# Patient Record
Sex: MALE | Race: ASIAN | NOT HISPANIC OR LATINO | Employment: UNEMPLOYED | URBAN - METROPOLITAN AREA
[De-identification: names, ages, dates, MRNs, and addresses within clinical notes are randomized per-mention and may not be internally consistent; named-entity substitution may affect disease eponyms.]

---

## 2022-05-02 ENCOUNTER — HOSPITAL ENCOUNTER (EMERGENCY)
Facility: MEDICAL CENTER | Age: 52
End: 2022-05-02
Attending: EMERGENCY MEDICINE
Payer: COMMERCIAL

## 2022-05-02 ENCOUNTER — APPOINTMENT (OUTPATIENT)
Dept: RADIOLOGY | Facility: MEDICAL CENTER | Age: 52
End: 2022-05-02
Attending: EMERGENCY MEDICINE
Payer: COMMERCIAL

## 2022-05-02 VITALS
DIASTOLIC BLOOD PRESSURE: 92 MMHG | HEART RATE: 102 BPM | SYSTOLIC BLOOD PRESSURE: 149 MMHG | TEMPERATURE: 98.2 F | OXYGEN SATURATION: 94 % | BODY MASS INDEX: 27.81 KG/M2 | WEIGHT: 173.06 LBS | RESPIRATION RATE: 19 BRPM | HEIGHT: 66 IN

## 2022-05-02 DIAGNOSIS — E11.9 TYPE 2 DIABETES MELLITUS WITHOUT COMPLICATION, WITHOUT LONG-TERM CURRENT USE OF INSULIN (HCC): ICD-10-CM

## 2022-05-02 DIAGNOSIS — M10.9 ACUTE GOUT OF LEFT FOOT, UNSPECIFIED CAUSE: ICD-10-CM

## 2022-05-02 DIAGNOSIS — J02.0 STREP PHARYNGITIS: ICD-10-CM

## 2022-05-02 DIAGNOSIS — E78.5 HYPERLIPIDEMIA, UNSPECIFIED HYPERLIPIDEMIA TYPE: ICD-10-CM

## 2022-05-02 DIAGNOSIS — E04.1 THYROID NODULE: ICD-10-CM

## 2022-05-02 LAB
ALBUMIN SERPL BCP-MCNC: 4.5 G/DL (ref 3.2–4.9)
ALBUMIN/GLOB SERPL: 1.2 G/DL
ALP SERPL-CCNC: 156 U/L (ref 30–99)
ALT SERPL-CCNC: 84 U/L (ref 2–50)
ANION GAP SERPL CALC-SCNC: 15 MMOL/L (ref 7–16)
AST SERPL-CCNC: 36 U/L (ref 12–45)
BASOPHILS # BLD AUTO: 0.4 % (ref 0–1.8)
BASOPHILS # BLD: 0.07 K/UL (ref 0–0.12)
BILIRUB SERPL-MCNC: 0.7 MG/DL (ref 0.1–1.5)
BUN SERPL-MCNC: 12 MG/DL (ref 8–22)
CALCIUM SERPL-MCNC: 9.7 MG/DL (ref 8.5–10.5)
CHLORIDE SERPL-SCNC: 103 MMOL/L (ref 96–112)
CO2 SERPL-SCNC: 21 MMOL/L (ref 20–33)
CREAT SERPL-MCNC: 0.75 MG/DL (ref 0.5–1.4)
EOSINOPHIL # BLD AUTO: 0.04 K/UL (ref 0–0.51)
EOSINOPHIL NFR BLD: 0.2 % (ref 0–6.9)
ERYTHROCYTE [DISTWIDTH] IN BLOOD BY AUTOMATED COUNT: 42.3 FL (ref 35.9–50)
GFR SERPLBLD CREATININE-BSD FMLA CKD-EPI: 109 ML/MIN/1.73 M 2
GLOBULIN SER CALC-MCNC: 3.8 G/DL (ref 1.9–3.5)
GLUCOSE SERPL-MCNC: 129 MG/DL (ref 65–99)
HCT VFR BLD AUTO: 53.2 % (ref 42–52)
HGB BLD-MCNC: 18.5 G/DL (ref 14–18)
IMM GRANULOCYTES # BLD AUTO: 0.16 K/UL (ref 0–0.11)
IMM GRANULOCYTES NFR BLD AUTO: 1 % (ref 0–0.9)
LACTATE BLD-SCNC: 1.3 MMOL/L (ref 0.5–2)
LYMPHOCYTES # BLD AUTO: 1.37 K/UL (ref 1–4.8)
LYMPHOCYTES NFR BLD: 8.3 % (ref 22–41)
MCH RBC QN AUTO: 28.7 PG (ref 27–33)
MCHC RBC AUTO-ENTMCNC: 34.8 G/DL (ref 33.7–35.3)
MCV RBC AUTO: 82.5 FL (ref 81.4–97.8)
MONOCYTES # BLD AUTO: 0.87 K/UL (ref 0–0.85)
MONOCYTES NFR BLD AUTO: 5.3 % (ref 0–13.4)
NEUTROPHILS # BLD AUTO: 13.96 K/UL (ref 1.82–7.42)
NEUTROPHILS NFR BLD: 84.8 % (ref 44–72)
NRBC # BLD AUTO: 0 K/UL
NRBC BLD-RTO: 0 /100 WBC
PLATELET # BLD AUTO: 349 K/UL (ref 164–446)
PMV BLD AUTO: 9 FL (ref 9–12.9)
POTASSIUM SERPL-SCNC: 4 MMOL/L (ref 3.6–5.5)
PROT SERPL-MCNC: 8.3 G/DL (ref 6–8.2)
RBC # BLD AUTO: 6.45 M/UL (ref 4.7–6.1)
S PYO DNA SPEC NAA+PROBE: DETECTED
SODIUM SERPL-SCNC: 139 MMOL/L (ref 135–145)
WBC # BLD AUTO: 16.5 K/UL (ref 4.8–10.8)

## 2022-05-02 PROCEDURE — 71045 X-RAY EXAM CHEST 1 VIEW: CPT

## 2022-05-02 PROCEDURE — 700105 HCHG RX REV CODE 258: Performed by: EMERGENCY MEDICINE

## 2022-05-02 PROCEDURE — 96365 THER/PROPH/DIAG IV INF INIT: CPT | Mod: XU

## 2022-05-02 PROCEDURE — 96375 TX/PRO/DX INJ NEW DRUG ADDON: CPT | Mod: XU

## 2022-05-02 PROCEDURE — 99284 EMERGENCY DEPT VISIT MOD MDM: CPT

## 2022-05-02 PROCEDURE — 80053 COMPREHEN METABOLIC PANEL: CPT

## 2022-05-02 PROCEDURE — 87040 BLOOD CULTURE FOR BACTERIA: CPT

## 2022-05-02 PROCEDURE — 36415 COLL VENOUS BLD VENIPUNCTURE: CPT

## 2022-05-02 PROCEDURE — 70491 CT SOFT TISSUE NECK W/DYE: CPT

## 2022-05-02 PROCEDURE — 700117 HCHG RX CONTRAST REV CODE 255: Performed by: EMERGENCY MEDICINE

## 2022-05-02 PROCEDURE — 83605 ASSAY OF LACTIC ACID: CPT

## 2022-05-02 PROCEDURE — 85025 COMPLETE CBC W/AUTO DIFF WBC: CPT

## 2022-05-02 PROCEDURE — 700111 HCHG RX REV CODE 636 W/ 250 OVERRIDE (IP): Performed by: EMERGENCY MEDICINE

## 2022-05-02 PROCEDURE — 87651 STREP A DNA AMP PROBE: CPT

## 2022-05-02 PROCEDURE — 93005 ELECTROCARDIOGRAM TRACING: CPT | Performed by: EMERGENCY MEDICINE

## 2022-05-02 PROCEDURE — 700101 HCHG RX REV CODE 250: Performed by: EMERGENCY MEDICINE

## 2022-05-02 RX ORDER — CLINDAMYCIN PHOSPHATE 600 MG/50ML
600 INJECTION, SOLUTION INTRAVENOUS ONCE
Status: COMPLETED | OUTPATIENT
Start: 2022-05-02 | End: 2022-05-02

## 2022-05-02 RX ORDER — DEXAMETHASONE SODIUM PHOSPHATE 4 MG/ML
10 INJECTION, SOLUTION INTRA-ARTICULAR; INTRALESIONAL; INTRAMUSCULAR; INTRAVENOUS; SOFT TISSUE ONCE
Status: COMPLETED | OUTPATIENT
Start: 2022-05-02 | End: 2022-05-02

## 2022-05-02 RX ORDER — SODIUM CHLORIDE, SODIUM LACTATE, POTASSIUM CHLORIDE, AND CALCIUM CHLORIDE .6; .31; .03; .02 G/100ML; G/100ML; G/100ML; G/100ML
1000 INJECTION, SOLUTION INTRAVENOUS ONCE
Status: COMPLETED | OUTPATIENT
Start: 2022-05-02 | End: 2022-05-02

## 2022-05-02 RX ORDER — COLCHICINE 0.6 MG/1
0.6 TABLET ORAL DAILY
Qty: 30 TABLET | Refills: 0 | Status: SHIPPED | OUTPATIENT
Start: 2022-05-02 | End: 2022-06-07

## 2022-05-02 RX ORDER — OXYCODONE HYDROCHLORIDE AND ACETAMINOPHEN 5; 325 MG/1; MG/1
1 TABLET ORAL EVERY 6 HOURS PRN
Qty: 10 TABLET | Refills: 0 | Status: SHIPPED | OUTPATIENT
Start: 2022-05-02 | End: 2022-05-07

## 2022-05-02 RX ORDER — PREDNISONE 20 MG/1
40 TABLET ORAL DAILY
Qty: 10 TABLET | Refills: 0 | Status: SHIPPED | OUTPATIENT
Start: 2022-05-02 | End: 2022-05-07

## 2022-05-02 RX ORDER — CLINDAMYCIN HYDROCHLORIDE 300 MG/1
300 CAPSULE ORAL 3 TIMES DAILY
Qty: 30 CAPSULE | Refills: 0 | Status: SHIPPED | OUTPATIENT
Start: 2022-05-02 | End: 2022-05-12

## 2022-05-02 RX ORDER — SIMVASTATIN 20 MG
20 TABLET ORAL NIGHTLY
Qty: 30 TABLET | Refills: 0 | Status: SHIPPED | OUTPATIENT
Start: 2022-05-02 | End: 2022-06-16 | Stop reason: SDUPTHER

## 2022-05-02 RX ORDER — ALLOPURINOL 300 MG/1
300 TABLET ORAL 2 TIMES DAILY
Qty: 60 TABLET | Refills: 0 | Status: SHIPPED | OUTPATIENT
Start: 2022-05-02 | End: 2022-06-01

## 2022-05-02 RX ORDER — KETOROLAC TROMETHAMINE 30 MG/ML
15 INJECTION, SOLUTION INTRAMUSCULAR; INTRAVENOUS ONCE
Status: COMPLETED | OUTPATIENT
Start: 2022-05-02 | End: 2022-05-02

## 2022-05-02 RX ADMIN — SODIUM CHLORIDE, POTASSIUM CHLORIDE, SODIUM LACTATE AND CALCIUM CHLORIDE 1000 ML: 600; 310; 30; 20 INJECTION, SOLUTION INTRAVENOUS at 04:05

## 2022-05-02 RX ADMIN — KETOROLAC TROMETHAMINE 15 MG: 30 INJECTION, SOLUTION INTRAMUSCULAR at 04:06

## 2022-05-02 RX ADMIN — CLINDAMYCIN PHOSPHATE 600 MG: 600 INJECTION, SOLUTION INTRAVENOUS at 04:29

## 2022-05-02 RX ADMIN — DEXAMETHASONE SODIUM PHOSPHATE 10 MG: 4 INJECTION, SOLUTION INTRA-ARTICULAR; INTRALESIONAL; INTRAMUSCULAR; INTRAVENOUS; SOFT TISSUE at 04:06

## 2022-05-02 RX ADMIN — IOHEXOL 85 ML: 350 INJECTION, SOLUTION INTRAVENOUS at 04:40

## 2022-05-02 ASSESSMENT — ENCOUNTER SYMPTOMS
COUGH: 0
SHORTNESS OF BREATH: 0
EYE REDNESS: 0
NECK PAIN: 0
FEVER: 0
CHILLS: 0
FOCAL WEAKNESS: 0
ABDOMINAL PAIN: 0
BACK PAIN: 0
BLURRED VISION: 0
SEIZURES: 0
SORE THROAT: 1
VOMITING: 0
HEADACHES: 0

## 2022-05-02 NOTE — ED NOTES
Pt roomed in Red[facility].   Pt AOx4, GCS 15  Pt resting on gurney, able to speak in short sentences and states he able to breathe but not swallow. Pt in gown and placed on monitors  Pt utilizing suction as needed.

## 2022-05-02 NOTE — DISCHARGE INSTRUCTIONS
You were seen in the Emergency Department for sore throat due to strep infection.    Labs showed leukocytosis however were otherwise reassuring.  CT scan showed bilateral enlarged tonsils without abscess.  Strep testing was positive.    CT did show incidental thyroid nodule, please follow up with your PCP for ultrasound    Please use 1,000mg of tylenol or 600mg of ibuprofen every 6 hours as needed for pain.  Take antibiotics and steroids as directed.    Please follow up with your primary care physician.    Return to the Emergency Department with worsening pain, trouble breathing, unable to drink, persistent fevers, or other concerns.

## 2022-05-02 NOTE — ED PROVIDER NOTES
"ED Provider Note    CHIEF COMPLAINT  Chief Complaint   Patient presents with   • Difficulty Swallowing     X2 days, unable to maintain secretions.        HPI  Divya Damon is a 52 y.o. male with a history of diabetes, hyperlipidemia, gout who presents to the emergency department with sore throat and difficulty swallowing.  Patient states symptoms started 2 days ago.  He describes severe pain on both sides of his throat since that time.  He has not had associated fevers, congestion, coughing.  He is having trouble swallowing and drinking fluids due to pain.  He denies any associated shortness of breath.  The patient is also reporting pain in the left foot that is consistent with prior gout flares after \"eating something he shouldn't have.\"    REVIEW OF SYSTEMS  See HPI for further details.   Review of Systems   Constitutional: Negative for chills and fever.   HENT: Positive for sore throat.         Trouble swallowing   Eyes: Negative for blurred vision and redness.   Respiratory: Negative for cough and shortness of breath.    Cardiovascular: Negative for chest pain and leg swelling.   Gastrointestinal: Negative for abdominal pain and vomiting.   Genitourinary: Negative for dysuria and urgency.   Musculoskeletal: Negative for back pain and neck pain.        Left foot pain   Skin: Negative for rash.   Neurological: Negative for focal weakness, seizures and headaches.   Psychiatric/Behavioral: Negative for suicidal ideas.         PAST MEDICAL HISTORY   has a past medical history of Gout.    SOCIAL HISTORY  Social History     Tobacco Use   • Smoking status: Current Every Day Smoker     Packs/day: 0.25     Types: Cigarettes   • Smokeless tobacco: Not on file   Substance and Sexual Activity   • Alcohol use: No   • Drug use: No   • Sexual activity: Not on file       SURGICAL HISTORY   has a past surgical history that includes lymph node excision.    CURRENT MEDICATIONS  Home Medications    **Home medications have not yet " "been reviewed for this encounter**         ALLERGIES  Allergies   Allergen Reactions   • Pcn [Penicillins]        PHYSICAL EXAM   VITAL SIGNS: /92   Pulse (!) 102   Temp 36.8 °C (98.2 °F) (Temporal)   Resp 19   Ht 1.676 m (5' 6\")   Wt 78.5 kg (173 lb 1 oz)   SpO2 94%   BMI 27.93 kg/m²      Physical Exam  Constitutional:       General: He is not in acute distress.     Comments: Nontoxic-appearing middle-age male   HENT:      Head: Normocephalic and atraumatic.      Mouth/Throat:      Pharynx: Oropharyngeal exudate and posterior oropharyngeal erythema present.      Comments: Posterior oropharynx with bilaterally enlarged tonsils that are close to touching with overlying exudates.  No trismus or submandibular fullness.  Eyes:      Conjunctiva/sclera: Conjunctivae normal.      Pupils: Pupils are equal, round, and reactive to light.   Neck:      Comments: Full range of motion without meningismus  Cardiovascular:      Rate and Rhythm: Regular rhythm. Tachycardia present.      Heart sounds: Normal heart sounds.   Pulmonary:      Effort: Pulmonary effort is normal. No respiratory distress.      Breath sounds: Normal breath sounds.   Abdominal:      General: There is no distension.      Palpations: Abdomen is soft.      Tenderness: There is no abdominal tenderness.   Musculoskeletal:         General: Tenderness present. Normal range of motion.      Cervical back: Normal range of motion and neck supple.      Comments: Mild redness and swelling to the lateral aspect of the left foot.  Normal range of motion of the ankle without significant pain.   Skin:     General: Skin is warm and dry.   Neurological:      Mental Status: He is alert and oriented to person, place, and time.      Comments: Moving all extremities spontaneously   Psychiatric:         Mood and Affect: Affect normal.           DIAGNOSTIC STUDIES      LABS  Personally reviewed by me  Labs Reviewed   CBC WITH DIFFERENTIAL - Abnormal; Notable for the " "following components:       Result Value    WBC 16.5 (*)     RBC 6.45 (*)     Hemoglobin 18.5 (*)     Hematocrit 53.2 (*)     Neutrophils-Polys 84.80 (*)     Lymphocytes 8.30 (*)     Immature Granulocytes 1.00 (*)     Neutrophils (Absolute) 13.96 (*)     Monos (Absolute) 0.87 (*)     Immature Granulocytes (abs) 0.16 (*)     All other components within normal limits   COMP METABOLIC PANEL - Abnormal; Notable for the following components:    Glucose 129 (*)     ALT(SGPT) 84 (*)     Alkaline Phosphatase 156 (*)     Total Protein 8.3 (*)     Globulin 3.8 (*)     All other components within normal limits   GROUP A STREP BY PCR - Abnormal; Notable for the following components:    Group A Strep by PCR DETECTED (*)     All other components within normal limits   LACTIC ACID   ESTIMATED GFR   LACTIC ACID   LACTIC ACID   BLOOD CULTURE    Narrative:     1 of 2 for Blood Culture x 2 sites order. Per Hospital  Policy: Only change Specimen Src: to \"Line\" if specified by  physician order.   BLOOD CULTURE    Narrative:     2 of 2 blood culture x2  Sites order. Per Hospital Policy:  Only change Specimen Src: to \"Line\" if specified by physician  order.           RADIOLOGY  Personally reviewed by me  CT-SOFT TISSUE NECK WITH   Final Result      1.  BILATERAL tonsillitis with adjacent edema or early phlegmon but no well-defined abscess on either side   2.  Enlarged BILATERAL neck lymph nodes, likely reactive   3.  8 mm LEFT thyroid nodule which could be further assessed with ultrasound when clinically appropriate      DX-CHEST-PORTABLE (1 VIEW)   Final Result      No acute cardiac or pulmonary abnormalities are identified.              ED COURSE  Vitals:    05/02/22 0500 05/02/22 0504 05/02/22 0531 05/02/22 0552   BP: 129/88 125/84 146/77 149/92   Pulse: (!) 107 (!) 112 95 (!) 102   Resp: 20 19     Temp:    36.8 °C (98.2 °F)   TempSrc:    Temporal   SpO2: 95% 92% 95% 94%   Weight:       Height:             Medications " administered:  Medications   lactated ringers infusion (BOLUS) (1,000 mL Intravenous New Bag 5/2/22 0405)   dexamethasone (DECADRON) injection 10 mg (10 mg Intravenous Given 5/2/22 0406)   ketorolac (TORADOL) injection 15 mg (15 mg Intravenous Given 5/2/22 0406)   clindamycin (CLEOCIN) IVPB premix 600 mg (0 mg Intravenous Stopped 5/2/22 0509)   iohexol (OMNIPAQUE) 350 mg/mL (IV) (85 mL Intravenous Given 5/2/22 0440)       Old records personally reviewed:  None recent        MEDICAL DECISION MAKING  Patient with history of diabetes and gout who presents with 2-day history of severe sore throat and difficulty swallowing.  He is afebrile, tachycardic on arrival with otherwise reassuring vital signs.  He did initially have a somewhat muffled voice and difficulty swallowing secretions due to pain however did not have any concern for airway compromise.  Labs demonstrated leukocytosis with without associated elevation of lactate concerning for severe sepsis.  Labs are otherwise reassuring without significant electrolyte abnormality or dehydration.  Chest x-ray does not show pneumonia or pulmonary edema.  Strep testing was positive.  CT scan of the neck demonstrates bilateral tonsillitis without evidence of peritonsillar or retropharyngeal abscess, epiglottitis, Guillermo's angina.    Pain to the left foot seems consistent with prior gout flares.  No concern for overlying cellulitis, septic arthritis, trauma.    6:00 AM - Upon reassessment, patient is resting comfortably with improved vital signs however he does remain mildly tachycardic.  No new complaints at this time.  He states he feels significantly improved and is speaking in full sentences without issue and tolerating water following steroids and toradol.  We discussed possibility of admission for overnight monitoring however the patient feels comfortable going home.  Will discharge home on antibiotics as well as steroids for both tonsillitis as well as his gout flare.   Will refill home medications at his request as well.  Discussed results with patient and/or family as well as importance of primary care follow up.  Patient understands plan of care and strict return precautions for new or changing symptoms.       IMPRESSION  (J02.0) Strep pharyngitis  (M10.9) Acute gout of left foot, unspecified cause  (E11.9) Type 2 diabetes mellitus without complication, without long-term current use of insulin (HCC)  (E78.5) Hyperlipidemia, unspecified hyperlipidemia type    Disposition: Discharge home, stable condition  Results, diagnoses, and treatment options were discussed with the patient and/or family. Patient verbalized understanding of plan of care.    Patient referred to primary care provider for monitoring and treatment of blood pressure.      New Prescriptions    ALLOPURINOL (ZYLOPRIM) 300 MG TAB    Take 1 Tablet by mouth 2 times a day for 30 days.    CLINDAMYCIN (CLEOCIN) 300 MG CAP    Take 1 Capsule by mouth 3 times a day for 10 days.    COLCHICINE (COLCRYS) 0.6 MG TAB    Take 1 Tablet by mouth every day.    METFORMIN (GLUCOPHAGE) 500 MG TAB    Take 1 Tablet by mouth 2 times a day with meals.    OXYCODONE-ACETAMINOPHEN (PERCOCET) 5-325 MG TAB    Take 1 Tablet by mouth every 6 hours as needed for Severe Pain for up to 5 days.    PREDNISONE (DELTASONE) 20 MG TAB    Take 2 Tablets by mouth every day for 5 days.    SIMVASTATIN (ZOCOR) 20 MG TAB    Take 1 Tablet by mouth every evening.       In prescribing controlled substances to this patient, I certify that I have obtained and reviewed the medical history of Divya Damon. I have also made a good virgen effort to obtain applicable records from other providers who have treated the patient and records did not demonstrate any increased risk of substance abuse that would prevent me from prescribing controlled substances.     I have conducted a physical exam and documented it. I have reviewed Mr. Damon’s prescription history as maintained by  the [place] Prescription Monitoring Program.     I have assessed the patient’s risk for abuse, dependency, and addiction using the validated Opioid Risk Tool available at https://www.mdcalc.com/caqont-kbhr-zjqs-ort-narcotic-abuse.     Given the above, I believe the benefits of controlled substance therapy outweigh the risks. The reasons for prescribing controlled substances include non-narcotic, oral analgesic alternatives have been inadequate for pain control. Accordingly, I have discussed the risk and benefits, treatment plan, and alternative therapies with the patient.         Electronically signed by: Merry Sterling M.D., 5/2/2022 5:55 AM

## 2022-05-02 NOTE — ED TRIAGE NOTES
"Chief Complaint   Patient presents with   • Difficulty Swallowing     X2 days, unable to maintain secretions.      Pt ambulated to triage for above complaint. Pt has had sore throat with difficulty swallowing x2 days which has gotten worse. Pt is unable to maintain secretions in triage, muffled speech, pain in his throat. Charge RN notified, pt roomed to red 1.     /98   Pulse (!) 123   Temp 36.9 °C (98.4 °F) (Temporal)   Resp 18   Ht 1.676 m (5' 6\")   Wt 78.5 kg (173 lb 1 oz)   SpO2 95%   BMI 27.93 kg/m²     "

## 2022-05-02 NOTE — ED NOTES
Dc papers reviewed w/pt and rx sent to pharm of choice  Narcotic consent signed and in chart  Pt ambulated out with a steady gait all belongings in possession

## 2022-05-07 LAB
BACTERIA BLD CULT: NORMAL
BACTERIA BLD CULT: NORMAL
SIGNIFICANT IND 70042: NORMAL
SIGNIFICANT IND 70042: NORMAL
SITE SITE: NORMAL
SITE SITE: NORMAL
SOURCE SOURCE: NORMAL
SOURCE SOURCE: NORMAL

## 2022-05-09 NOTE — DISCHARGE PLANNING
Anticipated Discharge Disposition: Home    Action: Prior auth request faxed to ER CM. Chart reviewed. RX [place]. Appears in Chart to be self pay  [facility]. Attempted to call patient, no answer, no vm. Call to pharmacy [facility] spoke with  Alyssa,  Pt has [place] and Medicaid Secondary [id number]. Teams message sent to PFA to update chart, ER CM [id number]. Call to     Barriers to Discharge: need demographics updated .    Plan: will cont to follow

## 2022-05-23 NOTE — DISCHARGE PLANNING
Anticipated Discharge Disposition: Home    Action: ER CM completed PAR on 5.9.22. Received denial today from insurer [employer]. Pt will need to go to PCP and try formulary RX first and fail per notes. Called pt [phone] advised of same, general VM left only as no identifier.19:23 second attempt unable to leave vm . Pharmacist aware and will reach out to pt to see pcp    Barriers to Discharge: None    Plan: await call back to advise pt to see pcp

## 2022-05-28 ENCOUNTER — HOSPITAL ENCOUNTER (EMERGENCY)
Facility: MEDICAL CENTER | Age: 52
End: 2022-05-28
Attending: EMERGENCY MEDICINE
Payer: COMMERCIAL

## 2022-05-28 ENCOUNTER — APPOINTMENT (OUTPATIENT)
Dept: RADIOLOGY | Facility: MEDICAL CENTER | Age: 52
End: 2022-05-28
Attending: EMERGENCY MEDICINE
Payer: COMMERCIAL

## 2022-05-28 VITALS
HEART RATE: 84 BPM | RESPIRATION RATE: 16 BRPM | HEIGHT: 66 IN | TEMPERATURE: 98.6 F | OXYGEN SATURATION: 93 % | DIASTOLIC BLOOD PRESSURE: 81 MMHG | BODY MASS INDEX: 28.73 KG/M2 | SYSTOLIC BLOOD PRESSURE: 137 MMHG | WEIGHT: 178.79 LBS

## 2022-05-28 DIAGNOSIS — M10.9 ACUTE GOUT OF LEFT FOOT, UNSPECIFIED CAUSE: ICD-10-CM

## 2022-05-28 LAB
ALBUMIN SERPL BCP-MCNC: 4.1 G/DL (ref 3.2–4.9)
ALBUMIN/GLOB SERPL: 1.1 G/DL
ALP SERPL-CCNC: 117 U/L (ref 30–99)
ALT SERPL-CCNC: 30 U/L (ref 2–50)
ANION GAP SERPL CALC-SCNC: 12 MMOL/L (ref 7–16)
AST SERPL-CCNC: 24 U/L (ref 12–45)
BASOPHILS # BLD AUTO: 0 % (ref 0–1.8)
BASOPHILS # BLD: 0 K/UL (ref 0–0.12)
BILIRUB SERPL-MCNC: 0.5 MG/DL (ref 0.1–1.5)
BUN SERPL-MCNC: 8 MG/DL (ref 8–22)
CALCIUM SERPL-MCNC: 9.2 MG/DL (ref 8.5–10.5)
CHLORIDE SERPL-SCNC: 102 MMOL/L (ref 96–112)
CO2 SERPL-SCNC: 23 MMOL/L (ref 20–33)
CREAT SERPL-MCNC: 0.74 MG/DL (ref 0.5–1.4)
EOSINOPHIL # BLD AUTO: 0.25 K/UL (ref 0–0.51)
EOSINOPHIL NFR BLD: 1.8 % (ref 0–6.9)
ERYTHROCYTE [DISTWIDTH] IN BLOOD BY AUTOMATED COUNT: 42.5 FL (ref 35.9–50)
GFR SERPLBLD CREATININE-BSD FMLA CKD-EPI: 110 ML/MIN/1.73 M 2
GLOBULIN SER CALC-MCNC: 3.6 G/DL (ref 1.9–3.5)
GLUCOSE SERPL-MCNC: 141 MG/DL (ref 65–99)
HCT VFR BLD AUTO: 46 % (ref 42–52)
HGB BLD-MCNC: 16.6 G/DL (ref 14–18)
LYMPHOCYTES # BLD AUTO: 2.07 K/UL (ref 1–4.8)
LYMPHOCYTES NFR BLD: 14.9 % (ref 22–41)
MANUAL DIFF BLD: NORMAL
MCH RBC QN AUTO: 29.7 PG (ref 27–33)
MCHC RBC AUTO-ENTMCNC: 36.1 G/DL (ref 33.7–35.3)
MCV RBC AUTO: 82.4 FL (ref 81.4–97.8)
MONOCYTES # BLD AUTO: 0.97 K/UL (ref 0–0.85)
MONOCYTES NFR BLD AUTO: 7 % (ref 0–13.4)
MORPHOLOGY BLD-IMP: NORMAL
MYELOCYTES NFR BLD MANUAL: 0.9 %
NEUTROPHILS # BLD AUTO: 10.48 K/UL (ref 1.82–7.42)
NEUTROPHILS NFR BLD: 75.4 % (ref 44–72)
NRBC # BLD AUTO: 0 K/UL
NRBC BLD-RTO: 0 /100 WBC
PLATELET # BLD AUTO: 271 K/UL (ref 164–446)
PLATELET BLD QL SMEAR: NORMAL
PMV BLD AUTO: 9.1 FL (ref 9–12.9)
POTASSIUM SERPL-SCNC: 3.9 MMOL/L (ref 3.6–5.5)
PROT SERPL-MCNC: 7.7 G/DL (ref 6–8.2)
RBC # BLD AUTO: 5.58 M/UL (ref 4.7–6.1)
RBC BLD AUTO: NORMAL
SODIUM SERPL-SCNC: 137 MMOL/L (ref 135–145)
WBC # BLD AUTO: 13.9 K/UL (ref 4.8–10.8)

## 2022-05-28 PROCEDURE — 36415 COLL VENOUS BLD VENIPUNCTURE: CPT

## 2022-05-28 PROCEDURE — 73630 X-RAY EXAM OF FOOT: CPT | Mod: LT

## 2022-05-28 PROCEDURE — 85007 BL SMEAR W/DIFF WBC COUNT: CPT

## 2022-05-28 PROCEDURE — 700102 HCHG RX REV CODE 250 W/ 637 OVERRIDE(OP): Performed by: EMERGENCY MEDICINE

## 2022-05-28 PROCEDURE — 85025 COMPLETE CBC W/AUTO DIFF WBC: CPT

## 2022-05-28 PROCEDURE — 99284 EMERGENCY DEPT VISIT MOD MDM: CPT

## 2022-05-28 PROCEDURE — 80053 COMPREHEN METABOLIC PANEL: CPT

## 2022-05-28 PROCEDURE — 700111 HCHG RX REV CODE 636 W/ 250 OVERRIDE (IP): Performed by: EMERGENCY MEDICINE

## 2022-05-28 PROCEDURE — 96374 THER/PROPH/DIAG INJ IV PUSH: CPT

## 2022-05-28 PROCEDURE — A9270 NON-COVERED ITEM OR SERVICE: HCPCS | Performed by: EMERGENCY MEDICINE

## 2022-05-28 RX ORDER — KETOROLAC TROMETHAMINE 30 MG/ML
30 INJECTION, SOLUTION INTRAMUSCULAR; INTRAVENOUS ONCE
Status: COMPLETED | OUTPATIENT
Start: 2022-05-28 | End: 2022-05-28

## 2022-05-28 RX ORDER — OXYCODONE HYDROCHLORIDE AND ACETAMINOPHEN 5; 325 MG/1; MG/1
1 TABLET ORAL EVERY 6 HOURS PRN
Qty: 10 TABLET | Refills: 0 | Status: SHIPPED | OUTPATIENT
Start: 2022-05-28 | End: 2022-05-29 | Stop reason: SDUPTHER

## 2022-05-28 RX ORDER — PREDNISONE 20 MG/1
40 TABLET ORAL DAILY
Qty: 10 TABLET | Refills: 0 | Status: SHIPPED | OUTPATIENT
Start: 2022-05-28 | End: 2022-05-29 | Stop reason: SDUPTHER

## 2022-05-28 RX ORDER — OXYCODONE HYDROCHLORIDE AND ACETAMINOPHEN 5; 325 MG/1; MG/1
2 TABLET ORAL ONCE
Status: COMPLETED | OUTPATIENT
Start: 2022-05-28 | End: 2022-05-28

## 2022-05-28 RX ADMIN — KETOROLAC TROMETHAMINE 30 MG: 30 INJECTION, SOLUTION INTRAMUSCULAR; INTRAVENOUS at 18:42

## 2022-05-28 RX ADMIN — OXYCODONE HYDROCHLORIDE AND ACETAMINOPHEN 2 TABLET: 5; 325 TABLET ORAL at 18:41

## 2022-05-28 ASSESSMENT — ENCOUNTER SYMPTOMS
CHILLS: 0
FEVER: 0

## 2022-05-28 ASSESSMENT — FIBROSIS 4 INDEX: FIB4 SCORE: 0.57

## 2022-05-28 ASSESSMENT — PAIN DESCRIPTION - PAIN TYPE: TYPE: ACUTE PAIN

## 2022-05-28 NOTE — ED TRIAGE NOTES
"Chief Complaint   Patient presents with   • Foot Pain   • Foot Swelling     Pt to ED with above complaint that has progressively worsened. Pt rates pain >10   PMHx: Gout  Pt educated on the triage process. Instructed to notify staff of any change or worsening of condition; sent to lobby to await room assignment.      BP (!) 169/97   Pulse 100   Temp 36.3 °C (97.4 °F) (Temporal)   Resp 18   Ht 1.676 m (5' 6\")   Wt 81.1 kg (178 lb 12.7 oz)   SpO2 96%   BMI 28.86 kg/m²       "

## 2022-05-29 RX ORDER — OXYCODONE HYDROCHLORIDE AND ACETAMINOPHEN 5; 325 MG/1; MG/1
1 TABLET ORAL EVERY 6 HOURS PRN
Qty: 10 TABLET | Refills: 0 | Status: SHIPPED | OUTPATIENT
Start: 2022-05-29 | End: 2022-05-31

## 2022-05-29 RX ORDER — PREDNISONE 20 MG/1
40 TABLET ORAL DAILY
Qty: 10 TABLET | Refills: 0 | Status: SHIPPED | OUTPATIENT
Start: 2022-05-29 | End: 2022-06-03

## 2022-05-29 NOTE — ED NOTES
18 Ga IV established in the RAC by US guidance.       GUILLE Wilkinson medicated pt per MAR.

## 2022-05-29 NOTE — DISCHARGE PLANNING
Pt called and requested Percocet be sent to [facility] on [facility] for insurance purposes. I let Dr. Mackenzie know this and he stated he will send the Rx to this [facility].  I contacted the pt back ([phone]) and let him know that the ERP would send in the Percocet Rx to [facility] on [facility].

## 2022-05-29 NOTE — ED PROVIDER NOTES
ED Provider Note    Scribed for Broderick Mackenzie M.D. by Susana Orozco. 5/28/2022, 6:13 PM.    Primary care provider: Bg Mead M.D.  Means of arrival: EMS  History obtained from: Patient  History limited by: None    CHIEF COMPLAINT  Chief Complaint   Patient presents with   • Foot Pain   • Foot Swelling       ELIZABETH Damon is a 52 y.o. male, with a history of gout, who presents to the Emergency Department via EMS for progressively worsening left big toe pain. His pain is rated a 10/10 severity at this time. Patient notes his pain today is similar to his last episode of gout. Patient attempted to take his leftover Percocet from his last gout flair up several months ago with some mild relief but notes he ran out of this medication. He adds that prednisone has been successful in the past for his gout symptoms. He reports associated left foot swelling. He denies any associated fever, chills, or trauma. He admits to smoking 0.5 ppd, and occasional alcohol use. He denies any illicit drug use. He notes an allergy to Penicillin.    REVIEW OF SYSTEMS  Review of Systems   Constitutional: Negative for chills and fever.   Musculoskeletal:        Left foot pain/swelling, no trauma   All other systems reviewed and are negative.    PAST MEDICAL HISTORY   has a past medical history of Gout.  Diabetes, hyperlipidemia    SURGICAL HISTORY   has a past surgical history that includes lymph node excision.    SOCIAL HISTORY  Social History     Tobacco Use   • Smoking status: Current Every Day Smoker     Packs/day: 0.50     Types: Cigarettes   • Smokeless tobacco: Never Used   Substance Use Topics   • Alcohol use: Yes     Comment: occ   • Drug use: No      Social History     Substance and Sexual Activity   Drug Use No       FAMILY HISTORY  History reviewed. No pertinent family history.    CURRENT MEDICATIONS  Home Medications     Reviewed by Gilma Monroe R.N. (Registered Nurse) on 05/28/22 at 1643  Med List Status: Not  "Addressed   Medication Last Dose Status   allopurinol (ZYLOPRIM) 100 MG TABS  Active   allopurinol (ZYLOPRIM) 300 MG Tab  Active   cephALEXin (KEFLEX) 500 MG CAPS  Active   colchicine (COLCRYS) 0.6 MG Tab  Active   colchicine 0.6 MG TABS  Active   indomethacin SR (INDOCIN SR) 75 MG CPCR  Active   metFORMIN (GLUCOPHAGE) 500 MG Tab  Active   predniSONE (DELTASONE) 20 MG TABS  Active   simvastatin (ZOCOR) 20 MG Tab  Active                ALLERGIES  Allergies   Allergen Reactions   • Pcn [Penicillins]        PHYSICAL EXAM  VITAL SIGNS: BP (!) 155/99   Pulse 96   Temp 36.3 °C (97.4 °F) (Temporal)   Resp 18   Ht 1.676 m (5' 6\")   Wt 81.1 kg (178 lb 12.7 oz)   SpO2 95%   BMI 28.86 kg/m²   Vitals reviewed.  Constitutional: Well developed, Well nourished, No acute distress, Non-toxic appearance.   HENT: Normocephalic, Atraumatic, Bilateral external ears normal, Oropharynx moist, No oral exudates, Nose normal.   Eyes: PERRL, EOMI, Conjunctiva normal, No discharge.   Neck: Normal range of motion, No tenderness, Supple, No stridor.   Cardiovascular: Normal heart rate, Normal rhythm, No murmurs, No rubs, No gallops.   Thorax & Lungs: Normal breath sounds, No respiratory distress, No wheezing, No chest tenderness.   Abdomen: Bowel sounds normal, Soft, No tenderness  Skin: Warm, Dry, No erythema, No rash.   Back: No tenderness, No CVA tenderness.   Musculoskeletal: Left first toe: swollen and redness just under 1st MCP joint. Good range of motion in all major joints. No edema. No major deformities noted.   Neurologic: Alert, No focal deficits noted.   Psychiatric: Affect normal    LABS  Results for orders placed or performed during the hospital encounter of 05/28/22   CBC WITH DIFFERENTIAL   Result Value Ref Range    WBC 13.9 (H) 4.8 - 10.8 K/uL    RBC 5.58 4.70 - 6.10 M/uL    Hemoglobin 16.6 14.0 - 18.0 g/dL    Hematocrit 46.0 42.0 - 52.0 %    MCV 82.4 81.4 - 97.8 fL    MCH 29.7 27.0 - 33.0 pg    MCHC 36.1 (H) 33.7 - 35.3 " g/dL    RDW 42.5 35.9 - 50.0 fL    Platelet Count 271 164 - 446 K/uL    MPV 9.1 9.0 - 12.9 fL    Neutrophils-Polys 75.40 (H) 44.00 - 72.00 %    Lymphocytes 14.90 (L) 22.00 - 41.00 %    Monocytes 7.00 0.00 - 13.40 %    Eosinophils 1.80 0.00 - 6.90 %    Basophils 0.00 0.00 - 1.80 %    Nucleated RBC 0.00 /100 WBC    Neutrophils (Absolute) 10.48 (H) 1.82 - 7.42 K/uL    Lymphs (Absolute) 2.07 1.00 - 4.80 K/uL    Monos (Absolute) 0.97 (H) 0.00 - 0.85 K/uL    Eos (Absolute) 0.25 0.00 - 0.51 K/uL    Baso (Absolute) 0.00 0.00 - 0.12 K/uL    NRBC (Absolute) 0.00 K/uL   COMP METABOLIC PANEL   Result Value Ref Range    Sodium 137 135 - 145 mmol/L    Potassium 3.9 3.6 - 5.5 mmol/L    Chloride 102 96 - 112 mmol/L    Co2 23 20 - 33 mmol/L    Anion Gap 12.0 7.0 - 16.0    Glucose 141 (H) 65 - 99 mg/dL    Bun 8 8 - 22 mg/dL    Creatinine 0.74 0.50 - 1.40 mg/dL    Calcium 9.2 8.5 - 10.5 mg/dL    AST(SGOT) 24 12 - 45 U/L    ALT(SGPT) 30 2 - 50 U/L    Alkaline Phosphatase 117 (H) 30 - 99 U/L    Total Bilirubin 0.5 0.1 - 1.5 mg/dL    Albumin 4.1 3.2 - 4.9 g/dL    Total Protein 7.7 6.0 - 8.2 g/dL    Globulin 3.6 (H) 1.9 - 3.5 g/dL    A-G Ratio 1.1 g/dL   DIFFERENTIAL MANUAL   Result Value Ref Range    Myelocytes 0.90 %    Manual Diff Status PERFORMED    PERIPHERAL SMEAR REVIEW   Result Value Ref Range    Peripheral Smear Review see below    PLATELET ESTIMATE   Result Value Ref Range    Plt Estimation #CAC    MORPHOLOGY   Result Value Ref Range    RBC Morphology #CRI    ESTIMATED GFR   Result Value Ref Range    GFR (CKD-EPI) 110 >60 mL/min/1.73 m 2     All labs reviewed by me.    RADIOLOGY  DX-FOOT-COMPLETE 3+ LEFT   Final Result      1.  Soft tissue swelling medial to the 1st metatarsophalangeal joint and there are erosion on the proximal aspects of the 1st proximal phalanx      2.  This combination findings consistent with inflammatory arthritis and likely indicates gout      3.  Probable 5th metatarsal head erosion      4.  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    COURSE & MEDICAL DECISION MAKING  Pertinent Labs & Imaging studies reviewed. (See chart for details)    6:13 PM Patient seen and examined at bedside. The patient presents with left foot pain, and the differential diagnosis includes but is not limited to gout. Ordered for CMP, CBC with diff, and DX Foot Left to evaluate. Patient will be treated with Toradol 30 mg injection, and Percocet 5-325 mg tablet for pain control.      8:14 PM - Patient was reevaluated at bedside. He is resting comfortably in bed feeling improved after interventions. Discussed lab and radiology results with the patient as outlined above. The patient will return for new or worsening symptoms and is stable at the time of discharge. He will take his medications as prescribed. Patient verbalizes understanding and agreement to this plan of care.      The patient's radiographic evaluation is suggestive of gout as this is clinical history and exam.  He will be treated with steroids and pain medications.  He will be given return precautions for infection.  Return for pain, swelling, redness, fever or other concerns.  The patient will be prescribed pain medicines.  He is given a work note I spoke with his doctor he was counseled not to drive on pain medicines.  Questions are answered, agreeable to plan.  The patient is advised to stop taking allopurinol during this acute phase continue his colchicine and his other medicines.    I reviewed prescription monitoring program for patient's narcotic use before prescribing a scheduled drug.The patient will not drink alcohol nor drive with prescribed medications.     In prescribing controlled substances to this patient, I certify that I have obtained and reviewed the medical history of Divya Damon. I have also made a good virgen effort to obtain applicable records  from other providers who have treated the patient and records did not demonstrate any increased risk of substance abuse that would prevent me from prescribing controlled substances.     I have conducted a physical exam and documented it. I have reviewed Mr. Damon’s prescription history as maintained by the [place] Prescription Monitoring Program.     I have assessed the patient’s risk for abuse, dependency, and addiction using the validated Opioid Risk Tool available at https://www.mdcalc.com/fsmybt-jqel-ewsd-ort-narcotic-abuse.     Given the above, I believe the benefits of controlled substance therapy outweigh the risks. The reasons for prescribing controlled substances include non-narcotic, oral analgesic alternatives have been inadequate for pain control. Accordingly, I have discussed the risk and benefits, treatment plan, and alternative therapies with the patient.        DISPOSITION:  Patient will be discharged home in stable condition.    FOLLOW UP:  Bg Mead M.D.  [address]  [address]  [facility] [address]  [phone]            OUTPATIENT MEDICATIONS:  New Prescriptions    OXYCODONE-ACETAMINOPHEN (PERCOCET) 5-325 MG TAB    Take 1 Tablet by mouth every 6 hours as needed (pain) for up to 2 days.    PREDNISONE (DELTASONE) 20 MG TAB    Take 2 Tablets by mouth every day for 5 days.       FINAL IMPRESSION  1. Acute gout of left foot, unspecified cause    2.  Diabetes with mild hyperglycemia     Susana PATRICIA (Keyla), am scribing for, and in the presence of, Broderick Mackenzie M.D..    Electronically signed by: Susana Orozco (Keyla), 5/28/2022    Broderick PATRICIA M.D. personally performed the services described in this documentation, as scribed by Susana Orozco in my presence, and it is both accurate and complete.    C    The note accurately reflects work and decisions made by me.  Broderick Mackenzie M.D.  5/29/2022  12:18 AM    Addendum: I was called by case management stating the  prescription of Percocet was not able to be filled at the [facility] because of the patient's secondary insurance.  Pharmacist was  To call and verify this is in fact the case canceled that prescription and it was resent to the updated new pharmacy of [facility] on [address]. Case management was made aware so they can discuss it with the patient.

## 2022-05-29 NOTE — DISCHARGE INSTRUCTIONS
Return to the emergency department for more pain, if you have fever, increasing redness or other concerns.  Take medicines as prescribed.  No driving on Percocet.  Stop allopurinol into the acute part of your gout has resolved.  Continue colchicine.  Follow-up with your doctor.  Drink Plenty of fluids.

## 2022-06-07 ENCOUNTER — OFFICE VISIT (OUTPATIENT)
Dept: MEDICAL GROUP | Facility: MEDICAL CENTER | Age: 52
End: 2022-06-07
Payer: COMMERCIAL

## 2022-06-07 VITALS
TEMPERATURE: 96.9 F | WEIGHT: 171.4 LBS | BODY MASS INDEX: 27.55 KG/M2 | OXYGEN SATURATION: 96 % | HEIGHT: 66 IN | SYSTOLIC BLOOD PRESSURE: 138 MMHG | DIASTOLIC BLOOD PRESSURE: 78 MMHG | HEART RATE: 101 BPM

## 2022-06-07 DIAGNOSIS — Z11.3 SCREEN FOR STD (SEXUALLY TRANSMITTED DISEASE): ICD-10-CM

## 2022-06-07 DIAGNOSIS — E78.49 OTHER HYPERLIPIDEMIA: ICD-10-CM

## 2022-06-07 DIAGNOSIS — Z11.59 NEED FOR HEPATITIS C SCREENING TEST: ICD-10-CM

## 2022-06-07 DIAGNOSIS — M1A.0790 IDIOPATHIC CHRONIC GOUT OF FOOT WITHOUT TOPHUS, UNSPECIFIED LATERALITY: ICD-10-CM

## 2022-06-07 DIAGNOSIS — E11.9 TYPE 2 DIABETES MELLITUS WITHOUT COMPLICATION, WITHOUT LONG-TERM CURRENT USE OF INSULIN (HCC): ICD-10-CM

## 2022-06-07 PROBLEM — I48.0 PAROXYSMAL ATRIAL FIBRILLATION (HCC): Status: ACTIVE | Noted: 2022-06-07

## 2022-06-07 PROCEDURE — 99204 OFFICE O/P NEW MOD 45 MIN: CPT | Performed by: FAMILY MEDICINE

## 2022-06-07 RX ORDER — PREDNISONE 20 MG/1
TABLET ORAL
COMMUNITY
Start: 2022-06-06 | End: 2022-07-15

## 2022-06-07 RX ORDER — ALLOPURINOL 300 MG/1
300 TABLET ORAL 2 TIMES DAILY
COMMUNITY
Start: 2022-06-06 | End: 2022-06-16 | Stop reason: SDUPTHER

## 2022-06-07 RX ORDER — INDOMETHACIN 75 MG/1
75 CAPSULE, EXTENDED RELEASE ORAL 2 TIMES DAILY
Qty: 60 CAPSULE | Refills: 3 | Status: SHIPPED | OUTPATIENT
Start: 2022-06-07 | End: 2022-06-16 | Stop reason: SDUPTHER

## 2022-06-07 RX ORDER — COLCHICINE 0.6 MG/1
0.6 TABLET ORAL DAILY
Qty: 30 TABLET | Refills: 3 | Status: SHIPPED | OUTPATIENT
Start: 2022-06-07 | End: 2022-06-16 | Stop reason: SDUPTHER

## 2022-06-07 ASSESSMENT — FIBROSIS 4 INDEX: FIB4 SCORE: 0.82

## 2022-06-07 ASSESSMENT — PATIENT HEALTH QUESTIONNAIRE - PHQ9: CLINICAL INTERPRETATION OF PHQ2 SCORE: 0

## 2022-06-07 NOTE — PROGRESS NOTES
Subjective:     CC:  Diagnoses of Idiopathic chronic gout of foot without tophus, unspecified laterality, Screen for STD (sexually transmitted disease), Other hyperlipidemia, Type 2 diabetes mellitus without complication, without long-term current use of insulin (HCC), and Need for hepatitis C screening test were pertinent to this visit.    HISTORY OF THE PRESENT ILLNESS: Patient is a 52 y.o. male. This pleasant patient is here today to establish care and discuss gout flares.     Patient having gout flares.  Admits to not eating a good diet for this including he really likes chicken liver.  Patient had a visit to ER on May 28, 2022 for gout flare.  Was treated with prednisone and a couple doses of Oxy 10.  This has gotten better but he has recurrent symptoms.  He tells me he is on allopurinol, indomethacin and colchicine as suppressive therapy.  He is wondering if he can have pain medicine on hand.  He is also wonder if he can get a scooter for when he has gout flares to get around.    Patient endorses MSM, he tells me he wears condoms regularly and is not receptive.  He is not have any current symptoms or would like to get screening.    Patient has significant family history of diabetes and cancer.  He is on metformin 500 mg twice daily.  He also takes simvastatin 20 mg.    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  He says he is doing well.  He tells me he is maintaining abstinence over the last few years.  He does endorse sometimes having cravings but he tries to avoid contact with other users.    Problem   Other Hyperlipidemia   Paroxysmal Atrial Fibrillation (Hcc)   Type 2 Diabetes Mellitus Without Complication, Without Long-Term Current Use of Insulin (Hcc)   Chronic Idiopathic Gout of Foot   Gout (Resolved)       Current Outpatient Medications Ordered in Epic   Medication Sig Dispense Refill   • allopurinol (ZYLOPRIM) 300 MG Tab  "Take 300 mg by mouth 2 times a day.     • predniSONE (DELTASONE) 20 MG Tab TAKE 2 TABLETS BY MOUTH ONCE DAILY FOR 5 DAYS     • indomethacin SR (INDOCIN SR) 75 MG Cap CR Take 1 Capsule by mouth 2 times a day. 60 Capsule 3   • colchicine (COLCRYS) 0.6 MG Tab Take 1 Tablet by mouth every day. 30 Tablet 3   • metFORMIN (GLUCOPHAGE) 500 MG Tab Take 1 Tablet by mouth 2 times a day with meals. 60 Tablet 0   • simvastatin (ZOCOR) 20 MG Tab Take 1 Tablet by mouth every evening. 30 Tablet 0     No current Epic-ordered facility-administered medications on file.       Health Maintenance: Not addressed in this visit visit    ROS:   ROS see HPI      Objective:       Exam: /78   Pulse (!) 101   Temp 36.1 °C (96.9 °F) (Temporal)   Ht 1.676 m (5' 6\")   Wt 77.7 kg (171 lb 6.4 oz)   SpO2 96%  Body mass index is 27.66 kg/m².    Physical Exam  Vitals reviewed.   Constitutional:       General: He is not in acute distress.     Appearance: Normal appearance.   HENT:      Head: Normocephalic and atraumatic.   Pulmonary:      Effort: Pulmonary effort is normal.   Musculoskeletal:         General: No swelling or deformity.      Right lower leg: Edema present.      Left lower leg: No edema.   Neurological:      Mental Status: He is alert. Mental status is at baseline.   Psychiatric:         Mood and Affect: Mood normal.         Behavior: Behavior normal.           Assessment & Plan:   52 y.o. male with the following -    Problem List Items Addressed This Visit     Other hyperlipidemia     We will get updated lipid panel  Continue simvastatin 20 mg for now           Relevant Orders    Lipid Profile    Type 2 diabetes mellitus without complication, without long-term current use of insulin (HCC)     We will get updated A1c  Continue metformin and statin May need to add ARB in near future and get microalbumin and check his monofilament             Relevant Orders    HEMOGLOBIN A1C    Lipid Profile    Chronic idiopathic gout of foot     " Discussed with patient I understand that he wants to have pain medicine and scooter available for when he gets gout flares but we need to work on prevention first.  He needs to stop eating chicken liver and other foods that increase his gout attacks.  I have refilled his colchicine and indomethacin as he is out of those.  I have not seen all 3 medications used at the same time but patient tells me that is what works for him.  We will get an updated uric acid.  Urgency room a few days ago showed normal kidney function.           Relevant Medications    allopurinol (ZYLOPRIM) 300 MG Tab    predniSONE (DELTASONE) 20 MG Tab    indomethacin SR (INDOCIN SR) 75 MG Cap CR    colchicine (COLCRYS) 0.6 MG Tab    Other Relevant Orders    URIC ACID      Other Visit Diagnoses     Screen for STD (sexually transmitted disease)        Relevant Orders    Chlamydia/GC, PCR (Urine)    T.PALLIDUM AB EIA    HIV AG/AB COMBO ASSAY SCREENING    HCV Scrn ( 1759-0588 [employer])    HEP B SURFACE ANTIGEN    Need for hepatitis C screening test        Relevant Orders    HCV Scrn ( 2880-6158 [employer])        Return in about 4 weeks (around 2022).    Please note that this dictation was created using voice recognition software. I have made every reasonable attempt to correct obvious errors, but I expect that there are errors of grammar and possibly content that I did not discover before finalizing the note.

## 2022-06-07 NOTE — ASSESSMENT & PLAN NOTE
We will get updated A1c  Continue metformin and statin May need to add ARB in near future and get microalbumin and check his monofilament

## 2022-06-07 NOTE — ASSESSMENT & PLAN NOTE
Discussed with patient I understand that he wants to have pain medicine and scooter available for when he gets gout flares but we need to work on prevention first.  He needs to stop eating chicken liver and other foods that increase his gout attacks.  I have refilled his colchicine and indomethacin as he is out of those.  I have not seen all 3 medications used at the same time but patient tells me that is what works for him.  We will get an updated uric acid.  Urgency room a few days ago showed normal kidney function.

## 2022-06-08 ENCOUNTER — HOSPITAL ENCOUNTER (OUTPATIENT)
Dept: LAB | Facility: MEDICAL CENTER | Age: 52
End: 2022-06-08
Attending: FAMILY MEDICINE
Payer: COMMERCIAL

## 2022-06-08 DIAGNOSIS — Z11.3 SCREEN FOR STD (SEXUALLY TRANSMITTED DISEASE): ICD-10-CM

## 2022-06-08 DIAGNOSIS — E78.49 OTHER HYPERLIPIDEMIA: ICD-10-CM

## 2022-06-08 DIAGNOSIS — Z11.59 NEED FOR HEPATITIS C SCREENING TEST: ICD-10-CM

## 2022-06-08 DIAGNOSIS — M1A.0790 IDIOPATHIC CHRONIC GOUT OF FOOT WITHOUT TOPHUS, UNSPECIFIED LATERALITY: ICD-10-CM

## 2022-06-08 DIAGNOSIS — E11.9 TYPE 2 DIABETES MELLITUS WITHOUT COMPLICATION, WITHOUT LONG-TERM CURRENT USE OF INSULIN (HCC): ICD-10-CM

## 2022-06-08 LAB
C TRACH DNA SPEC QL NAA+PROBE: NEGATIVE
CHOLEST SERPL-MCNC: 181 MG/DL (ref 100–199)
EST. AVERAGE GLUCOSE BLD GHB EST-MCNC: 163 MG/DL
FASTING STATUS PATIENT QL REPORTED: NORMAL
HBA1C MFR BLD: 7.3 % (ref 4–5.6)
HBV SURFACE AG SER QL: NORMAL
HCV AB SER QL: NORMAL
HDLC SERPL-MCNC: 55 MG/DL
HIV 1+2 AB+HIV1 P24 AG SERPL QL IA: NORMAL
LDLC SERPL CALC-MCNC: 83 MG/DL
N GONORRHOEA DNA SPEC QL NAA+PROBE: NEGATIVE
SPECIMEN SOURCE: NORMAL
T PALLIDUM AB SER QL IA: NORMAL
TRIGL SERPL-MCNC: 215 MG/DL (ref 0–149)
URATE SERPL-MCNC: 6.1 MG/DL (ref 2.5–8.3)

## 2022-06-08 PROCEDURE — 36415 COLL VENOUS BLD VENIPUNCTURE: CPT

## 2022-06-08 PROCEDURE — 87591 N.GONORRHOEAE DNA AMP PROB: CPT

## 2022-06-08 PROCEDURE — 80061 LIPID PANEL: CPT

## 2022-06-08 PROCEDURE — 87389 HIV-1 AG W/HIV-1&-2 AB AG IA: CPT

## 2022-06-08 PROCEDURE — 84550 ASSAY OF BLOOD/URIC ACID: CPT

## 2022-06-08 PROCEDURE — G0472 HEP C SCREEN HIGH RISK/OTHER: HCPCS

## 2022-06-08 PROCEDURE — 87340 HEPATITIS B SURFACE AG IA: CPT

## 2022-06-08 PROCEDURE — 83036 HEMOGLOBIN GLYCOSYLATED A1C: CPT

## 2022-06-08 PROCEDURE — 87491 CHLMYD TRACH DNA AMP PROBE: CPT

## 2022-06-08 PROCEDURE — 86780 TREPONEMA PALLIDUM: CPT

## 2022-06-16 ENCOUNTER — HOSPITAL ENCOUNTER (EMERGENCY)
Facility: MEDICAL CENTER | Age: 52
End: 2022-06-16
Attending: EMERGENCY MEDICINE
Payer: COMMERCIAL

## 2022-06-16 VITALS
DIASTOLIC BLOOD PRESSURE: 78 MMHG | SYSTOLIC BLOOD PRESSURE: 129 MMHG | HEART RATE: 93 BPM | HEIGHT: 66 IN | RESPIRATION RATE: 18 BRPM | WEIGHT: 168 LBS | OXYGEN SATURATION: 95 % | TEMPERATURE: 98.3 F | BODY MASS INDEX: 27 KG/M2

## 2022-06-16 DIAGNOSIS — M1A.0790 IDIOPATHIC CHRONIC GOUT OF FOOT WITHOUT TOPHUS, UNSPECIFIED LATERALITY: ICD-10-CM

## 2022-06-16 DIAGNOSIS — M25.571 ARTHRALGIA OF TOE OF RIGHT FOOT: ICD-10-CM

## 2022-06-16 DIAGNOSIS — E78.5 HYPERLIPIDEMIA, UNSPECIFIED HYPERLIPIDEMIA TYPE: ICD-10-CM

## 2022-06-16 DIAGNOSIS — E11.9 TYPE 2 DIABETES MELLITUS WITHOUT COMPLICATION, WITHOUT LONG-TERM CURRENT USE OF INSULIN (HCC): ICD-10-CM

## 2022-06-16 PROCEDURE — A9270 NON-COVERED ITEM OR SERVICE: HCPCS | Performed by: EMERGENCY MEDICINE

## 2022-06-16 PROCEDURE — 700102 HCHG RX REV CODE 250 W/ 637 OVERRIDE(OP): Performed by: EMERGENCY MEDICINE

## 2022-06-16 PROCEDURE — 99283 EMERGENCY DEPT VISIT LOW MDM: CPT

## 2022-06-16 RX ORDER — SIMVASTATIN 20 MG
20 TABLET ORAL NIGHTLY
Qty: 30 TABLET | Refills: 0 | Status: SHIPPED | OUTPATIENT
Start: 2022-06-16 | End: 2022-06-16 | Stop reason: SDUPTHER

## 2022-06-16 RX ORDER — COLCHICINE 0.6 MG/1
0.6 TABLET ORAL DAILY
Qty: 30 TABLET | Refills: 3 | Status: SHIPPED | OUTPATIENT
Start: 2022-06-16 | End: 2022-06-16 | Stop reason: SDUPTHER

## 2022-06-16 RX ORDER — OXYCODONE HYDROCHLORIDE AND ACETAMINOPHEN 5; 325 MG/1; MG/1
1 TABLET ORAL EVERY 4 HOURS PRN
Qty: 10 TABLET | Refills: 0 | Status: SHIPPED | OUTPATIENT
Start: 2022-06-16 | End: 2022-06-19

## 2022-06-16 RX ORDER — OXYCODONE HYDROCHLORIDE AND ACETAMINOPHEN 5; 325 MG/1; MG/1
2 TABLET ORAL ONCE
Status: COMPLETED | OUTPATIENT
Start: 2022-06-16 | End: 2022-06-16

## 2022-06-16 RX ORDER — INDOMETHACIN 75 MG/1
75 CAPSULE, EXTENDED RELEASE ORAL 2 TIMES DAILY
Qty: 60 CAPSULE | Refills: 3 | Status: SHIPPED | OUTPATIENT
Start: 2022-06-16 | End: 2022-06-16 | Stop reason: SDUPTHER

## 2022-06-16 RX ORDER — INDOMETHACIN 75 MG/1
75 CAPSULE, EXTENDED RELEASE ORAL 2 TIMES DAILY
Qty: 60 CAPSULE | Refills: 3 | Status: SHIPPED | OUTPATIENT
Start: 2022-06-16 | End: 2022-08-18

## 2022-06-16 RX ORDER — ALLOPURINOL 300 MG/1
300 TABLET ORAL 2 TIMES DAILY
Qty: 30 TABLET | Refills: 0 | Status: SHIPPED | OUTPATIENT
Start: 2022-06-16 | End: 2022-08-02 | Stop reason: SDUPTHER

## 2022-06-16 RX ORDER — ALLOPURINOL 300 MG/1
300 TABLET ORAL 2 TIMES DAILY
Qty: 30 TABLET | Refills: 0 | Status: SHIPPED | OUTPATIENT
Start: 2022-06-16 | End: 2022-06-16 | Stop reason: SDUPTHER

## 2022-06-16 RX ORDER — SIMVASTATIN 20 MG
20 TABLET ORAL NIGHTLY
Qty: 30 TABLET | Refills: 0 | Status: SHIPPED | OUTPATIENT
Start: 2022-06-16 | End: 2022-10-14 | Stop reason: SDUPTHER

## 2022-06-16 RX ORDER — OXYCODONE HYDROCHLORIDE AND ACETAMINOPHEN 5; 325 MG/1; MG/1
1 TABLET ORAL EVERY 4 HOURS PRN
Qty: 10 TABLET | Refills: 0 | Status: SHIPPED | OUTPATIENT
Start: 2022-06-16 | End: 2022-06-16 | Stop reason: SDUPTHER

## 2022-06-16 RX ORDER — COLCHICINE 0.6 MG/1
0.6 TABLET ORAL DAILY
Qty: 30 TABLET | Refills: 3 | Status: SHIPPED | OUTPATIENT
Start: 2022-06-16 | End: 2022-08-02 | Stop reason: SDUPTHER

## 2022-06-16 RX ADMIN — OXYCODONE HYDROCHLORIDE AND ACETAMINOPHEN 2 TABLET: 5; 325 TABLET ORAL at 02:40

## 2022-06-16 ASSESSMENT — FIBROSIS 4 INDEX: FIB4 SCORE: 0.82

## 2022-06-16 NOTE — ED NOTES
Pt provided with crutches and educated on use, pt verbalizes understanding. Pt is discharged. VSS. Paperwork explained and all questions answered. All belongings sent with pt upon departure. Pt ambulatory with a steady gait and assistance of crutches, out of ED.

## 2022-06-16 NOTE — ED PROVIDER NOTES
ED Provider Note    CHIEF COMPLAINT  Chief Complaint   Patient presents with   • Foot Pain       HPI  Divya Damon is a 52 y.o. male who presents for evaluation of right great toe pain for several days.  Patient notes a long history of gout and takes colchicine, allopurinol, and NSAIDs, with occasional prescription for Percocet in the past.  He notes [place] recently in [place] and all his medications got impounded with his car.  He has not had them in several days and notes the pain has been getting worse.  He notes no redness, fevers, chills, no swelling to the foot.  He has no current calf or knee pain and has not had any recent injuries.    REVIEW OF SYSTEMS  Constitutional: No fevers or chills  Skin: No rashes, abrasions, lacerations, or pruritus  HEENT: No sore throat, runny nose, sores, trouble swallowing, trouble speaking.  Neck: No neck pain, stiffness, or masses.  Chest: No pain   Pulm: No shortness of breath, or cough  Gastrointestinal: No nausea, vomiting, diarrhea, or abdominal pain  Musculoskeletal: No recent trauma, swelling, redness to affected foot/toe  Neurologic: No numbness, tingling, or focal motor weakness to affected foot.  Heme: No bleeding or bruising problems.   Immuno: History of gout    PAST FAM HISTORY  Family History   Problem Relation Age of Onset   • Cancer Mother         Lung   • Alzheimer's Disease Mother    • Psychiatric Illness Father         PTSD   • Stroke Father    • Cancer Father         throat   • Lung Disease Sister    • Cancer Sister         Lung, bones, brain   • Diabetes Sister         5 sister T1DM   • Psychiatric Illness Brother         PTSD   • Diabetes Brother         5 siblings are T2DM       PAST MEDICAL HISTORY   has a past medical history of Gout, Other hyperlipidemia, Paroxysmal atrial fibrillation (HCC), and Type 2 diabetes mellitus without complication, without long-term current use of insulin (HCC).    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    CURRENT MEDICATIONS  Home Medications     Reviewed by Katherin Chan R.N. (Registered Nurse) on 06/16/22 at 0348  Med List Status: Not Addressed   Medication Last Dose Status   allopurinol (ZYLOPRIM) 300 MG Tab  Active   colchicine (COLCRYS) 0.6 MG Tab  Active   indomethacin SR (INDOCIN SR) 75 MG Cap CR  Active   metFORMIN (GLUCOPHAGE) 500 MG Tab  Active   predniSONE (DELTASONE) 20 MG Tab  Active   simvastatin (ZOCOR) 20 MG Tab  Active                ALLERGIES  Allergies   Allergen Reactions   • Pcn [Penicillins]        PHYSICAL EXAM  VITAL SIGNS: /78   Pulse 93   Temp 36.8 °C (98.3 °F) (Temporal)   Resp 18   Ht 1.676 m (5' 6\")   Wt 76.2 kg (168 lb)   SpO2 95%   BMI 27.12 kg/m²    Gen: Alert, mildly anxious  HEENT: No signs of trauma, Bilateral external ears normal, Nose normal. Conjunctiva normal, Non-icteric.   Cardiovascular: Regular rate and rhythm on exam, no murmurs.  Capillary refill less than 3 seconds to all extremities, 2+ distal pulses.  Thorax & Lungs: Normal breath sounds, No respiratory distress, No wheezing bilateral chest rise  Skin: Warm, Dry, No erythema, No rash noted to exposed areas.   Extremities: Intact distal pulses, No edema.  Tenderness to the medial surface of the first metatarsal phalangeal joint.  There is no obvious swelling, erythema, induration, or lesions otherwise.  Patient has pain with range of motion of the great toe but no tenderness to the " great toe itself.  Dorsum and sole of the affected foot are nontender.  There is no Achilles or calf tenderness on the affected side        COURSE & MEDICAL DECISION MAKING  Patient arrives for evaluation of what appears to be untreated gout of the right great toe.  I suspect this is more chronic in nature however he notes that he was recently arrested and did not have any of his medications for a few days.  After discussion with the patient I will give him a short prescription for Percocet as well as new prescriptions for his other medications which he takes on a regular basis.  Notable that he has no changes to suggest a septic joint and I do not feel joint aspiration is in his best interest.  He has not had any recent trauma and I do not feel imaging will benefit him or .    FINAL IMPRESSION  1. Right great toe pain        Electronically signed by: Alon Romero M.D., 6/16/2022 2:31 AM

## 2022-06-16 NOTE — ED TRIAGE NOTES
Divya Damon  52 y.o. male    Chief Complaint   Patient presents with   • Foot Pain     Patient arrives with complaints of R foot pain. Hx gout. Pt has not had access to medication as car was impounded and meds in car.    Saw PCP for gout on 6/7.

## 2022-07-07 ENCOUNTER — APPOINTMENT (OUTPATIENT)
Dept: URGENT CARE | Facility: CLINIC | Age: 52
End: 2022-07-07
Payer: COMMERCIAL

## 2022-07-07 ENCOUNTER — HOSPITAL ENCOUNTER (EMERGENCY)
Facility: MEDICAL CENTER | Age: 52
End: 2022-07-07
Attending: EMERGENCY MEDICINE
Payer: COMMERCIAL

## 2022-07-07 ENCOUNTER — APPOINTMENT (OUTPATIENT)
Dept: MEDICAL GROUP | Facility: MEDICAL CENTER | Age: 52
End: 2022-07-07
Payer: COMMERCIAL

## 2022-07-07 VITALS
WEIGHT: 169.09 LBS | BODY MASS INDEX: 27.18 KG/M2 | TEMPERATURE: 97.7 F | HEIGHT: 66 IN | RESPIRATION RATE: 14 BRPM | OXYGEN SATURATION: 96 % | HEART RATE: 105 BPM | SYSTOLIC BLOOD PRESSURE: 138 MMHG | DIASTOLIC BLOOD PRESSURE: 98 MMHG

## 2022-07-07 DIAGNOSIS — M10.9 ACUTE GOUT OF RIGHT FOOT, UNSPECIFIED CAUSE: ICD-10-CM

## 2022-07-07 DIAGNOSIS — M79.671 RIGHT FOOT PAIN: ICD-10-CM

## 2022-07-07 PROCEDURE — 99282 EMERGENCY DEPT VISIT SF MDM: CPT

## 2022-07-07 RX ORDER — OXYCODONE HYDROCHLORIDE AND ACETAMINOPHEN 5; 325 MG/1; MG/1
1 TABLET ORAL EVERY 4 HOURS PRN
Qty: 15 TABLET | Refills: 0 | Status: SHIPPED | OUTPATIENT
Start: 2022-07-07 | End: 2022-07-12

## 2022-07-07 RX ORDER — INDOMETHACIN 50 MG/1
50 CAPSULE ORAL 3 TIMES DAILY
Qty: 90 CAPSULE | Refills: 0 | Status: SHIPPED | OUTPATIENT
Start: 2022-07-07 | End: 2022-08-18

## 2022-07-07 RX ORDER — COLCHICINE 0.6 MG/1
0.6 TABLET ORAL DAILY
Qty: 30 TABLET | Refills: 0 | Status: SHIPPED | OUTPATIENT
Start: 2022-07-07 | End: 2022-08-02

## 2022-07-07 RX ORDER — METHYLPREDNISOLONE 4 MG/1
TABLET ORAL
Qty: 1 EACH | Refills: 0 | Status: SHIPPED | OUTPATIENT
Start: 2022-07-07 | End: 2022-07-15 | Stop reason: SDUPTHER

## 2022-07-07 ASSESSMENT — FIBROSIS 4 INDEX: FIB4 SCORE: 0.82

## 2022-07-07 NOTE — ED NOTES
Patient to [facility] from [facility]; patient using crutches. He states he has a history of gout and has not been able to get refills of his medications, and thus has significant pain to his right foot.     Chart up for ERP.

## 2022-07-07 NOTE — ED PROVIDER NOTES
ED Provider Note    CHIEF COMPLAINT  Chief Complaint   Patient presents with   • Ankle Pain       HPI  Divya Damon is a 52 y.o. male who presents the past medical history significant for gout.  He reports that he has had right foot pain for the last few days consistent with his gout.  He recently has been off his medications because he could not see his primary care doctor and is requesting prescription for colchicine, indomethacin, and Percocet.  He denies fever or new symptoms.  Denies any trauma.    REVIEW OF SYSTEMS  See HPI for further details. All other systems are negative.     PAST MEDICAL HISTORY   has a past medical history of Gout, Other hyperlipidemia, Paroxysmal atrial fibrillation (HCC), and Type 2 diabetes mellitus without complication, without long-term current use of insulin (HCC).    SOCIAL HISTORY  Social History     Tobacco Use   • Smoking status: Current Every Day Smoker     Packs/day: 0.50     Types: Cigarettes     Start date: 1985   • Smokeless tobacco: Never Used   Vaping Use   • Vaping Use: Never used   Substance and Sexual Activity   • Alcohol use: Yes     Comment: occ   • Drug use: Yes     Types: Methamphetamines, Marijuana   • Sexual activity: Yes     Partners: Male     Birth control/protection: Condom       SURGICAL HISTORY   has a past surgical history that includes lymph node excision.    CURRENT MEDICATIONS  Home Medications     Reviewed by Gilma Monroe R.N. (Registered Nurse) on 07/07/22 at 1441  Med List Status: Not Addressed   Medication Last Dose Status   allopurinol (ZYLOPRIM) 300 MG Tab  Active   colchicine (COLCRYS) 0.6 MG Tab  Active   indomethacin SR (INDOCIN SR) 75 MG Cap CR  Active   metFORMIN (GLUCOPHAGE) 500 MG Tab  Active   predniSONE (DELTASONE) 20 MG Tab  Active   simvastatin (ZOCOR) 20 MG Tab  Active                ALLERGIES  Allergies   Allergen Reactions   • Pcn [Penicillins]        FAMILY HISTORY  No pertinent family history    PHYSICAL EXAM  VITAL SIGNS:  "BP (!) 138/92   Pulse (!) 119   Temp 36.7 °C (98 °F) (Temporal)   Resp 16   Ht 1.676 m (5' 6\")   Wt 76.7 kg (169 lb 1.5 oz)   SpO2 94%   BMI 27.29 kg/m²  @LUCINA[[id number]::@   Pulse ox interpretation: I interpret this pulse ox as normal.  Constitutional: Alert in no apparent distress.  HENT: No signs of trauma, Bilateral external ears normal, Nose normal.   Eyes: Pupils are equal and reactive, Conjunctiva normal, Non-icteric.   Neck: Normal range of motion, No tenderness, Supple, No stridor.   Skin: Warm, Dry, No erythema, No rash.   Back: No bony tenderness, No CVA tenderness.   Extremities: Intact distal pulses, tenderness to the foot.  No evidence of infection.  Musculoskeletal: Good range of motion in all major joints. No tenderness to palpation or major deformities noted.   Neurologic: Alert , Normal motor function, Normal sensory function, No focal deficits noted.   Psychiatric: Affect normal, Judgment normal, Mood normal.             COURSE & MEDICAL DECISION MAKING  Pertinent Labs & Imaging studies reviewed. (See chart for details)    The patient presents with a history of gout with gouty exacerbation of the right foot.  I will prescribe him colchicine, and the Cain, Medrol Dosepak, and a short course of Percocet.  If the patient has chronic pain I would like him to follow-up with Dr. Henry WALSH with his pain walk-in clinic.  The patient return for worsening symptoms.    I reviewed prescription monitoring program for patient's narcotic use before prescribing a scheduled drug.The patient will not drink alcohol nor drive with prescribed medications. The patient will return for new or worsening symptoms and is stable at the time of discharge.    The patient is referred to a primary physician for blood pressure management, diabetic screening, and for all other preventative health concerns.    In prescribing controlled substances to this patient, I certify that I have obtained and reviewed the medical history " of Divya Damon. I have also made a good virgen effort to obtain applicable records from other providers who have treated the patient and records did not demonstrate any increased risk of substance abuse that would prevent me from prescribing controlled substances.     I have conducted a physical exam and documented it. I have reviewed Mr. Damon’s prescription history as maintained by the [place] Prescription Monitoring Program.     I have assessed the patient’s risk for abuse, dependency, and addiction using the validated Opioid Risk Tool available at https://www.mdcalc.com/bvrltm-mbrg-iwwl-ort-narcotic-abuse.     Given the above, I believe the benefits of controlled substance therapy outweigh the risks. The reasons for prescribing controlled substances include non-narcotic, oral analgesic alternatives have been inadequate for pain control. Accordingly, I have discussed the risk and benefits, treatment plan, and alternative therapies with the patient.         DISPOSITION:  Patient will be discharged home in stable condition.    FOLLOW UP:  [facility], Emergency Dept  [address] [facility] [id number] [phone]  Follow up  If symptoms worsen    Ander Martínez D.O.  [address] [facility] [address]  [facility] [id number] [phone]    Follow up  As needed    Henry Marin M.D.  [address] [facility] [id number] [phone]    Follow up  Walk-in pain clinic Monday through Friday 9-5      OUTPATIENT MEDICATIONS:  New Prescriptions    COLCHICINE (COLCRYS) 0.6 MG TAB    Take 1 Tablet by mouth every day.    INDOMETHACIN (INDOCIN) 50 MG CAP    Take 1 Capsule by mouth 3 times a day.    METHYLPREDNISOLONE (MEDROL DOSEPAK) 4 MG TABLET THERAPY PACK    As directed    OXYCODONE-ACETAMINOPHEN (PERCOCET) 5-325 MG TAB    Take 1 Tablet by mouth every four hours as needed (pain) for up to 5 days.         The patient will not drink alcohol nor drive with prescribed medications. The  patient will return for worsening symptoms and is stable at the time of discharge. The patient verbalizes understanding and will comply.    FINAL IMPRESSION  1. Right foot pain  oxyCODONE-acetaminophen (PERCOCET) 5-325 MG Tab   2. Acute gout of right foot, unspecified cause  indomethacin (INDOCIN) 50 MG Cap    colchicine (COLCRYS) 0.6 MG Tab    methylPREDNISolone (MEDROL DOSEPAK) 4 MG Tablet Therapy Pack              Electronically signed by: Eliu Corona M.D., 7/7/2022 3:57 PM

## 2022-07-07 NOTE — ED TRIAGE NOTES
"Chief Complaint   Patient presents with   • Ankle Pain     Pt to ED through triage PMHx: Gout unable to get in with his PCP c/o 10/10 pain with ambulation 8/10 at rest.  Pt educated on the triage process. Instructed to notify staff of any change or worsening of condition; sent to lobby to await room assignment.      BP (!) 138/92   Pulse (!) 119   Temp 36.7 °C (98 °F) (Temporal)   Resp 16   Ht 1.676 m (5' 6\")   Wt 76.7 kg (169 lb 1.5 oz)   SpO2 94%   BMI 27.29 kg/m²     "

## 2022-07-07 NOTE — ED NOTES
Patient seen at bedside; they have no new complaints at this time and vital signs are stable. Patient given discharge paperwork and given opportunity to ask questions. Patient verbalized understanding of discharge instructions and return precautions. Patient ambulated from ED.

## 2022-07-15 ENCOUNTER — HOSPITAL ENCOUNTER (EMERGENCY)
Facility: MEDICAL CENTER | Age: 52
End: 2022-07-15
Attending: EMERGENCY MEDICINE
Payer: COMMERCIAL

## 2022-07-15 VITALS
OXYGEN SATURATION: 94 % | HEIGHT: 66 IN | RESPIRATION RATE: 18 BRPM | WEIGHT: 170 LBS | DIASTOLIC BLOOD PRESSURE: 75 MMHG | HEART RATE: 89 BPM | TEMPERATURE: 97.5 F | SYSTOLIC BLOOD PRESSURE: 121 MMHG | BODY MASS INDEX: 27.32 KG/M2

## 2022-07-15 DIAGNOSIS — Z76.0 MEDICATION REFILL: ICD-10-CM

## 2022-07-15 DIAGNOSIS — Z87.39 HISTORY OF GOUT: ICD-10-CM

## 2022-07-15 DIAGNOSIS — M10.9 ACUTE GOUT OF RIGHT FOOT, UNSPECIFIED CAUSE: ICD-10-CM

## 2022-07-15 PROCEDURE — 99282 EMERGENCY DEPT VISIT SF MDM: CPT

## 2022-07-15 RX ORDER — METHYLPREDNISOLONE 4 MG/1
TABLET ORAL
Qty: 1 EACH | Refills: 0 | Status: SHIPPED | OUTPATIENT
Start: 2022-07-15 | End: 2022-10-14

## 2022-07-15 RX ORDER — NAPROXEN 500 MG/1
500 TABLET ORAL 2 TIMES DAILY WITH MEALS
Qty: 20 TABLET | Refills: 0 | Status: SHIPPED | OUTPATIENT
Start: 2022-07-15 | End: 2022-08-18

## 2022-07-15 ASSESSMENT — FIBROSIS 4 INDEX: FIB4 SCORE: 0.82

## 2022-07-15 NOTE — ED TRIAGE NOTES
Divya Damon  52 y.o. male  Chief Complaint   Patient presents with   • Medication Refill     Pt needing refills of indomethacin and colchicine. Also states in need of a medrol dospak and percocet PRN. States unable to get into see PCP until 8/1. Pt reports hx of gout. Also states he is homeless.      Pt ambulatory to triage for above. Pt states has had 6 gout attacks in the last month. States having issues with insurance approving medications.      Triage process explained to patient, returned to lobby. Pt encouraged to notify staff of any change in condition.

## 2022-07-16 NOTE — ED PROVIDER NOTES
ED Provider Note    CHIEF COMPLAINT  Chief Complaint   Patient presents with   • Medication Refill     Pt needing refills of indomethacin and colchicine. Also states in need of a medrol dospak and percocet PRN. States unable to get into see PCP until 8/1. Pt reports hx of gout. Also states he is homeless.        HPI  Divya Damon is a 52 y.o. male who presents a refill of his medications that he uses for gout.  He states that he has no acute symptoms but wants to have medicine ready to go in the event he gets another flareup.  He states that he typically takes indomethacin or colchicine but his Medicaid does not cover those.  He is specifically requesting a Medrol Dosepak and Percocet.  He has no acute complaints at this time.  He was referred to a pain management physician but reports that his insurance would not cover this.    REVIEW OF SYSTEMS  Negative for fever, rash, chest pain, dyspnea, abdominal pain, back pain. All other systems are negative.     PAST MEDICAL HISTORY   has a past medical history of Gout, Other hyperlipidemia, Paroxysmal atrial fibrillation (HCC), and Type 2 diabetes mellitus without complication, without long-term current use of insulin (HCC).    SOCIAL HISTORY  Social History     Tobacco Use   • Smoking status: Current Every Day Smoker     Packs/day: 0.50     Types: Cigarettes     Start date: 1985   • Smokeless tobacco: Never Used   Vaping Use   • Vaping Use: Never used   Substance and Sexual Activity   • Alcohol use: Not Currently     Comment: occ   • Drug use: Not Currently     Types: Methamphetamines, Marijuana     Comment: hx   • Sexual activity: Yes     Partners: Male     Birth control/protection: Condom       SURGICAL HISTORY   has a past surgical history that includes lymph node excision.    CURRENT MEDICATIONS  I personally reviewed the medication list in the charting documentation.     ALLERGIES  Allergies   Allergen Reactions   • Pcn [Penicillins]        PHYSICAL  "EXAM  VITAL SIGNS: /75   Pulse 89   Temp 36.4 °C (97.5 °F)   Resp 18   Ht 1.676 m (5' 6\")   Wt 77.1 kg (170 lb)   SpO2 94%   BMI 27.44 kg/m²   Constitutional: Well appearing patient in no acute distress.  Awake and alert, not toxic nor ill in appearance.  HENT: Normocephalic, no obvious evidence of acute trauma.   Neck: Comfortable movement without any obvious restriction in the range of motion.  Eyes: Conjunctiva normal, Non-icteric.   Chest: Normal nonlabored respirations.  Skin: The exposed portions of skin reveal no obvious rash or other abnormalities.  Musculoskeletal: No obvious restriction in the range of motion in all major joints.   Neurologic: Alert, No obvious focal deficits noted.   Psychiatric: Affect normal for clinical presentation      COURSE & MEDICAL DECISION MAKING  Pertinent Labs & Imaging studies reviewed. (See chart for details)    Encounter Summary: This is a very pleasant 52 y.o. male who unfortunately required evaluation in the emergency department today with request for refill essentially of a Medrol Dosepak and Percocet in the event he were to get another gout flare.  I told him it would not be appropriate for me to prescribe him controlled medication like Percocet, I will prescribe him a Medrol Dosepak so he has this in the event he gets another flareup of his gout as well as some naproxen, he normally takes indomethacin or colchicine but his insurance is not covering those.  Strict return instructions provided.      DISPOSITION: Discharge Home      FINAL IMPRESSION  1. Medication refill    2. History of gout    3. Acute gout of right foot, unspecified cause        This dictation was created using voice recognition software. The accuracy of the dictation is limited to the abilities of the software. I expect there may be some errors of grammar and possibly content. The nursing notes were reviewed and certain aspects of this information were incorporated into this " note.    Electronically signed by: Zach Chand M.D., 7/15/2022 6:50 PM

## 2022-07-16 NOTE — ED NOTES
Pt received discharge instructions and understood all including follow up, pt ambulated to  Lobby with no difficulty

## 2022-08-02 ENCOUNTER — HOSPITAL ENCOUNTER (EMERGENCY)
Facility: MEDICAL CENTER | Age: 52
End: 2022-08-02
Attending: EMERGENCY MEDICINE
Payer: COMMERCIAL

## 2022-08-02 VITALS
HEART RATE: 96 BPM | OXYGEN SATURATION: 96 % | TEMPERATURE: 97.9 F | DIASTOLIC BLOOD PRESSURE: 91 MMHG | HEIGHT: 66 IN | SYSTOLIC BLOOD PRESSURE: 125 MMHG | RESPIRATION RATE: 14 BRPM | BODY MASS INDEX: 26.15 KG/M2 | WEIGHT: 162.7 LBS

## 2022-08-02 DIAGNOSIS — H10.31 ACUTE BACTERIAL CONJUNCTIVITIS OF RIGHT EYE: ICD-10-CM

## 2022-08-02 DIAGNOSIS — Z76.0 MEDICATION REFILL: ICD-10-CM

## 2022-08-02 DIAGNOSIS — E11.9 TYPE 2 DIABETES MELLITUS WITHOUT COMPLICATION, WITHOUT LONG-TERM CURRENT USE OF INSULIN (HCC): ICD-10-CM

## 2022-08-02 DIAGNOSIS — M1A.0790 IDIOPATHIC CHRONIC GOUT OF FOOT WITHOUT TOPHUS, UNSPECIFIED LATERALITY: ICD-10-CM

## 2022-08-02 DIAGNOSIS — S05.01XA ABRASION OF RIGHT CORNEA, INITIAL ENCOUNTER: ICD-10-CM

## 2022-08-02 DIAGNOSIS — Z87.39 HISTORY OF GOUT: ICD-10-CM

## 2022-08-02 DIAGNOSIS — J40 BRONCHITIS: ICD-10-CM

## 2022-08-02 PROCEDURE — 99282 EMERGENCY DEPT VISIT SF MDM: CPT

## 2022-08-02 PROCEDURE — 700101 HCHG RX REV CODE 250: Performed by: EMERGENCY MEDICINE

## 2022-08-02 RX ORDER — ALLOPURINOL 300 MG/1
300 TABLET ORAL 2 TIMES DAILY
Qty: 30 TABLET | Refills: 0 | Status: SHIPPED | OUTPATIENT
Start: 2022-08-02 | End: 2022-10-14 | Stop reason: SDUPTHER

## 2022-08-02 RX ORDER — POLYMYXIN B SULFATE AND TRIMETHOPRIM 1; 10000 MG/ML; [USP'U]/ML
1 SOLUTION OPHTHALMIC EVERY 4 HOURS
Qty: 10 ML | Refills: 0 | Status: SHIPPED | OUTPATIENT
Start: 2022-08-02 | End: 2022-10-14

## 2022-08-02 RX ORDER — BENZONATATE 100 MG/1
100 CAPSULE ORAL 3 TIMES DAILY PRN
Qty: 20 CAPSULE | Refills: 0 | Status: SHIPPED | OUTPATIENT
Start: 2022-08-02 | End: 2022-10-14

## 2022-08-02 RX ORDER — COLCHICINE 0.6 MG/1
0.6 TABLET ORAL DAILY
Qty: 30 TABLET | Refills: 3 | Status: SHIPPED | OUTPATIENT
Start: 2022-08-02 | End: 2022-12-06 | Stop reason: SDUPTHER

## 2022-08-02 RX ADMIN — FLUORESCEIN SODIUM 1 MG: 1 STRIP OPHTHALMIC at 21:00

## 2022-08-02 ASSESSMENT — FIBROSIS 4 INDEX: FIB4 SCORE: 0.82

## 2022-08-03 NOTE — ED NOTES
Pt presents w/ R eye pain and +discharge from the eye, PERRL. Stated it feels like something is in his eye. Pt also complaining of mouth pain and H/A second to the eye pain. Difficulty sleeping from the cough and is out of some of his medications due to a cancelled PCP appointment yesterday.

## 2022-08-03 NOTE — ED PROVIDER NOTES
ED Provider Note    CHIEF COMPLAINT  Chief Complaint   Patient presents with   • Eye Pain     Right eye redness, pain, and mild swelling. Pt says he got dirt in his eye and rubbed it yesterday and he's been having symptoms since.    • Cough     Started two days ago, pt reports smoking and he has a chronic night time cough from that but now he has some phlegm. Denies any covid exposure.    • Medication Refill     Pt says he is between doctors right now and would like a refill until he can get established with a new one. Multiple medications.        HPI  Divya Damon is a 52 y.o. male who presents acute complaints of painful red eye with discharge, and new cough.  He also is requesting medication refill.  He states that yesterday something flew in his eye, he rubbed his eye and since then his eye has been red and has been discharge.  He is also had a cough for the past couple days, no fever, no congestion or rhinorrhea with this.  Finally he is requesting medication for his gout, allopurinol and colchicine, also requesting prescription for Percocet as well as his metformin.  He was evaluated here by myself little over 2 weeks ago with similar request.    REVIEW OF SYSTEMS  Negative for fever, rash, chest pain, dyspnea, abdominal pain, back pain. All other systems are negative.     PAST MEDICAL HISTORY   has a past medical history of Gout, Other hyperlipidemia, Paroxysmal atrial fibrillation (HCC), and Type 2 diabetes mellitus without complication, without long-term current use of insulin (HCC).    SOCIAL HISTORY  Social History     Tobacco Use   • Smoking status: Current Every Day Smoker     Packs/day: 0.50     Types: Cigarettes     Start date: 1985   • Smokeless tobacco: Never Used   Vaping Use   • Vaping Use: Never used   Substance and Sexual Activity   • Alcohol use: Not Currently     Comment: occ   • Drug use: Yes     Types: Methamphetamines, Marijuana     Comment: Smoked meth last week   • Sexual  "activity: Yes     Partners: Male     Birth control/protection: Condom       SURGICAL HISTORY   has a past surgical history that includes lymph node excision.    CURRENT MEDICATIONS  I personally reviewed the medication list in the charting documentation.     ALLERGIES  Allergies   Allergen Reactions   • Pcn [Penicillins]        PHYSICAL EXAM  VITAL SIGNS: BP (!) 143/87   Pulse 80   Temp 36.6 °C (97.8 °F) (Temporal)   Resp 20   Ht 1.676 m (5' 6\")   Wt 73.8 kg (162 lb 11.2 oz)   SpO2 97%   BMI 26.26 kg/m²   Constitutional: Well appearing patient in no acute distress.  Awake and alert, not toxic nor ill in appearance.  HENT: Normocephalic, no obvious evidence of acute trauma.   Neck: Comfortable movement without any obvious restriction in the range of motion.  Eyes: Injected right conjunctiva.  Exudate.  Fluorescein staining reveals a tiny corneal abrasion about the 12:00 location of the cornea.   Chest: Normal nonlabored respirations.  Skin: The exposed portions of skin reveal no obvious rash or other abnormalities.  Musculoskeletal: No obvious restriction in the range of motion in all major joints.   Neurologic: Alert, No obvious focal deficits noted.   Psychiatric: Affect normal for clinical presentation    COURSE & MEDICAL DECISION MAKING  Pertinent Labs & Imaging studies reviewed. (See chart for details)    Encounter Summary: This is a very pleasant 52 y.o. male who unfortunately required evaluation in the emergency department today with acute conjunctivitis with corneal abrasion of the right eye, will be treated with Polytrim.  He also has a cough, has not hypoxic, we will treat him with Tessalon.  Finally request medicine for his gout, specifically requesting allopurinol and colchicine, these will be prescribed.  He is also requesting a refill of his as needed Percocet in case he needs it, I have again told him that that would not be appropriate from the emergency department.  He also requests metformin.  " These have been prescribed, have instructed him to follow-up with his primary care provider for further medication refills, discharged home in stable condition      DISPOSITION: Discharge Home      FINAL IMPRESSION  1. Acute bacterial conjunctivitis of right eye    2. Abrasion of right cornea, initial encounter    3. History of gout    4. Medication refill    5. Bronchitis    6. Idiopathic chronic gout of foot without tophus, unspecified laterality    7. Type 2 diabetes mellitus without complication, without long-term current use of insulin (HCC)        This dictation was created using voice recognition software. The accuracy of the dictation is limited to the abilities of the software. I expect there may be some errors of grammar and possibly content. The nursing notes were reviewed and certain aspects of this information were incorporated into this note.    Electronically signed by: Zach Chand M.D., 8/2/2022 8:56 PM

## 2022-08-03 NOTE — ED TRIAGE NOTES
Divya Damon  52 y.o. male  Chief Complaint   Patient presents with   • Eye Pain     Right eye redness, pain, and mild swelling. Pt says he got dirt in his eye and rubbed it yesterday and he's been having symptoms since.    • Cough     Started two days ago, pt reports smoking and he has a chronic night time cough from that but now he has some phlegm. Denies any covid exposure.    • Medication Refill     Pt says he is between doctors right now and would like a refill until he can get established with a new one. Multiple medications.      Pt reports smoking meth last week.     Vitals:    08/02/22 1917   BP: (!) 143/87   Pulse: 80   Resp: 20   Temp: 36.6 °C (97.8 °F)   SpO2: 97%       Triage process explained to patient, apologized for wait time, and returned to [facility].  Pt informed to notify staff of any change in condition.

## 2022-08-18 ENCOUNTER — HOSPITAL ENCOUNTER (EMERGENCY)
Facility: MEDICAL CENTER | Age: 52
End: 2022-08-18
Attending: EMERGENCY MEDICINE
Payer: COMMERCIAL

## 2022-08-18 VITALS
HEART RATE: 105 BPM | OXYGEN SATURATION: 98 % | BODY MASS INDEX: 26.15 KG/M2 | WEIGHT: 162.7 LBS | TEMPERATURE: 97.6 F | SYSTOLIC BLOOD PRESSURE: 133 MMHG | HEIGHT: 66 IN | DIASTOLIC BLOOD PRESSURE: 89 MMHG | RESPIRATION RATE: 17 BRPM

## 2022-08-18 DIAGNOSIS — M1A.0790 IDIOPATHIC CHRONIC GOUT OF FOOT WITHOUT TOPHUS, UNSPECIFIED LATERALITY: ICD-10-CM

## 2022-08-18 DIAGNOSIS — Z76.0 MEDICATION REFILL: Primary | ICD-10-CM

## 2022-08-18 PROCEDURE — 99282 EMERGENCY DEPT VISIT SF MDM: CPT

## 2022-08-18 RX ORDER — NAPROXEN SODIUM 275 MG/1
275 TABLET ORAL 2 TIMES DAILY WITH MEALS
Qty: 60 TABLET | Refills: 0 | Status: SHIPPED | OUTPATIENT
Start: 2022-08-18 | End: 2022-10-14 | Stop reason: SDUPTHER

## 2022-08-18 ASSESSMENT — ENCOUNTER SYMPTOMS
FEVER: 0
CHILLS: 0

## 2022-08-18 ASSESSMENT — FIBROSIS 4 INDEX: FIB4 SCORE: 0.82

## 2022-08-18 NOTE — ED TRIAGE NOTES
Divya Eliot Damon  52 y.o. male  Chief Complaint   Patient presents with    Medication Refill     Pt requesting medication naprosyn, prednisone PRN, and percocet PRN refilled.     Pt ambulatory with steady gait to triage for above complaint. Pt states he has no true medical complaint, wants to charge his phone and figured he could ask for a medication refill while he is here.     Pt is alert, oriented, and follows commands. Pt speaking in full sentences and responds appropriately to questions. No acute distress noted in triage and respirations are even and unlabored.     Pt placed in lobby and educated on triage process. Pt encouraged to alert staff for any changes in condition.

## 2022-08-18 NOTE — ED PROVIDER NOTES
ED Provider Note    Scribed for LEN Castro II* by Lorenza Ayala. 8/18/2022  2:46 AM    Means of Arrival: walk-in  History obtained by: patient  Limitations: none    CHIEF COMPLAINT  Chief Complaint   Patient presents with    Medication Refill     Pt requesting medication naprosyn, prednisone PRN, and percocet PRN refilled.       HPI  Divya Damon is a 52 y.o. male who presents to the Emergency Department to charge his phone then remembered he needs a medication refill. Divya states that he would like to have his naprosyn, prednisone, and percocet refilled. Divya has a history of gout and diabetes. He does not have any medical complaints at this time. No fever or chills.     REVIEW OF SYSTEMS  Review of Systems   Constitutional:  Negative for chills and fever.   All other systems reviewed and are negative.  See HPI for further details.     PAST MEDICAL HISTORY   has a past medical history of Gout, Other hyperlipidemia, Paroxysmal atrial fibrillation (HCC), and Type 2 diabetes mellitus without complication, without long-term current use of insulin (HCC).    SOCIAL HISTORY  Social History     Tobacco Use    Smoking status: Every Day     Packs/day: 0.50     Types: Cigarettes     Start date: 1985    Smokeless tobacco: Never   Vaping Use    Vaping Use: Never used   Substance and Sexual Activity    Alcohol use: Not Currently     Comment: occ    Drug use: Yes     Types: Methamphetamines, Marijuana     Comment: Smoked meth last week    Sexual activity: Yes     Partners: Male     Birth control/protection: Condom       SURGICAL HISTORY   has a past surgical history that includes lymph node excision.    CURRENT MEDICATIONS  Home Medications       Reviewed by Marian Goncalves R.N. (Registered Nurse) on 08/18/22 at 0229  Med List Status: Complete     Medication Last Dose Status   allopurinol (ZYLOPRIM) 300 MG Tab  Active   benzonatate (TESSALON) 100 MG Cap  Active   colchicine (COLCRYS) 0.6 MG Tab  Active  "  indomethacin (INDOCIN) 50 MG Cap Not Taking Active   indomethacin SR (INDOCIN SR) 75 MG Cap CR Not Taking Active   metFORMIN (GLUCOPHAGE) 500 MG Tab  Active   methylPREDNISolone (MEDROL DOSEPAK) 4 MG Tablet Therapy Pack Not Taking Active   naproxen (NAPROSYN) 500 MG Tab  Active   polymixin-trimethoprim (POLYTRIM) 27151-7.1 UNIT/ML-% Solution  Active   simvastatin (ZOCOR) 20 MG Tab  Active                    ALLERGIES  Allergies   Allergen Reactions    Pcn [Penicillins]        PHYSICAL EXAM  VITAL SIGNS: BP (!) 140/88   Pulse (!) 113   Temp 36.2 °C (97.1 °F) (Temporal)   Resp 18   Ht 1.676 m (5' 6\")   Wt 73.8 kg (162 lb 11.2 oz)   SpO2 98%   BMI 26.26 kg/m²    Pulse ox interpretation: I interpret this pulse ox as normal.  Constitutional: Alert in no apparent distress.   HENT: Normocephalic, Atraumatic, Bilateral external ears normal. Nose normal.   Neck: Supple, no stridor.   Eyes: Pupils are equal. Conjunctiva normal, non-icteric.   Heart: Regular rate and rhythm   Lungs: Unlabored breathing  Skin: Warm, Dry, No erythema, No rash. No open wounds.   Neurologic: Alert, Grossly non-focal.   Psychiatric:  Appears appropriate and not intoxicated.     COURSE & MEDICAL DECISION MAKING  Pertinent Labs & Imaging studies reviewed. (See chart for details)    2:46 AM This is an emergent evaluation of a 52 y.o., male who presents for a medication refill. He has chronic gout but not having significant pain right now.  I informed that ER does not refill narcotics and I do not see any reason why he should be on prednisone at this time. I will refill his naprosyn. Patient verbalizes understanding and agreement to this plan of care.      The patient will return for worsening symptoms and is stable at the time of discharge. The patient verbalizes understanding and will comply. Guidance provided on appropriate use of medications.     DISPOSITION:  Patient will be discharged home in stable condition.    FOLLOW UP:  Ander VANEGAS" JAVIER Martínez  [address] [address]  [place] [address]  [phone]    Schedule an appointment as soon as possible for a visit   As needed    OUTPATIENT MEDICATIONS:  Discharge Medication List as of 8/18/2022  3:28 AM        START taking these medications    Details   naproxen sodium (ANAPROX) 275 MG tablet Take 1 Tablet by mouth 2 times a day with meals., Disp-60 Tablet, R-0, Normal           FINAL IMPRESSION  1. Medication refill    2. Idiopathic chronic gout of foot without tophus, unspecified laterality           ILorenza (Scribe), am scribing for, and in the presence of, MITRA Castro II.    Electronically signed by: Lorenza Ayala (Keyla), 8/18/2022    Josue PATRICIA II, M* personally performed the services described in this documentation, as scribed by Lorenza Ayala in my presence, and it is both accurate and complete.    The note accurately reflects work and decisions made by me.  Josue Steele II, M.D.  8/18/2022  8:27 AM

## 2022-10-14 ENCOUNTER — HOSPITAL ENCOUNTER (OUTPATIENT)
Facility: MEDICAL CENTER | Age: 52
End: 2022-10-14
Attending: NURSE PRACTITIONER
Payer: COMMERCIAL

## 2022-10-14 ENCOUNTER — OFFICE VISIT (OUTPATIENT)
Dept: MEDICAL GROUP | Facility: MEDICAL CENTER | Age: 52
End: 2022-10-14
Attending: NURSE PRACTITIONER
Payer: COMMERCIAL

## 2022-10-14 VITALS
WEIGHT: 175.6 LBS | RESPIRATION RATE: 15 BRPM | OXYGEN SATURATION: 96 % | DIASTOLIC BLOOD PRESSURE: 60 MMHG | TEMPERATURE: 97.9 F | HEIGHT: 66 IN | HEART RATE: 111 BPM | SYSTOLIC BLOOD PRESSURE: 120 MMHG | BODY MASS INDEX: 28.22 KG/M2

## 2022-10-14 DIAGNOSIS — M1A.0790 IDIOPATHIC CHRONIC GOUT OF FOOT WITHOUT TOPHUS, UNSPECIFIED LATERALITY: ICD-10-CM

## 2022-10-14 DIAGNOSIS — Z76.89 ENCOUNTER TO ESTABLISH CARE: ICD-10-CM

## 2022-10-14 DIAGNOSIS — E11.9 TYPE 2 DIABETES MELLITUS WITHOUT COMPLICATION, WITHOUT LONG-TERM CURRENT USE OF INSULIN (HCC): ICD-10-CM

## 2022-10-14 DIAGNOSIS — E78.5 HYPERLIPIDEMIA, UNSPECIFIED HYPERLIPIDEMIA TYPE: ICD-10-CM

## 2022-10-14 DIAGNOSIS — Z76.0 MEDICATION REFILL: ICD-10-CM

## 2022-10-14 DIAGNOSIS — Z23 NEED FOR VACCINATION: ICD-10-CM

## 2022-10-14 LAB
AMBIGUOUS DTTM AMBI4: NORMAL
AMBIGUOUS DTTM AMBI4: NORMAL
APPEARANCE UR: CLEAR
BACTERIA #/AREA URNS HPF: NEGATIVE /HPF
BILIRUB UR QL STRIP.AUTO: NEGATIVE
COLOR UR: YELLOW
CREAT UR-MCNC: 147.97 MG/DL
EPI CELLS #/AREA URNS HPF: NEGATIVE /HPF
GLUCOSE UR STRIP.AUTO-MCNC: NEGATIVE MG/DL
HBA1C MFR BLD: 6.4 % (ref 0–5.6)
HYALINE CASTS #/AREA URNS LPF: ABNORMAL /LPF
INT CON NEG: ABNORMAL
INT CON POS: ABNORMAL
KETONES UR STRIP.AUTO-MCNC: NEGATIVE MG/DL
LEUKOCYTE ESTERASE UR QL STRIP.AUTO: NEGATIVE
MICRO URNS: ABNORMAL
MICROALBUMIN UR-MCNC: 14.7 MG/DL
MICROALBUMIN/CREAT UR: 99 MG/G (ref 0–30)
NITRITE UR QL STRIP.AUTO: NEGATIVE
PH UR STRIP.AUTO: 5 [PH] (ref 5–8)
PROT UR QL STRIP: 30 MG/DL
RBC # URNS HPF: ABNORMAL /HPF
RBC UR QL AUTO: NEGATIVE
SP GR UR STRIP.AUTO: 1.02
UROBILINOGEN UR STRIP.AUTO-MCNC: 0.2 MG/DL
WBC #/AREA URNS HPF: ABNORMAL /HPF

## 2022-10-14 PROCEDURE — 82570 ASSAY OF URINE CREATININE: CPT

## 2022-10-14 PROCEDURE — 83036 HEMOGLOBIN GLYCOSYLATED A1C: CPT | Performed by: NURSE PRACTITIONER

## 2022-10-14 PROCEDURE — 90471 IMMUNIZATION ADMIN: CPT

## 2022-10-14 PROCEDURE — 99202 OFFICE O/P NEW SF 15 MIN: CPT | Performed by: NURSE PRACTITIONER

## 2022-10-14 PROCEDURE — 99214 OFFICE O/P EST MOD 30 MIN: CPT | Performed by: NURSE PRACTITIONER

## 2022-10-14 PROCEDURE — 81001 URINALYSIS AUTO W/SCOPE: CPT

## 2022-10-14 PROCEDURE — 82043 UR ALBUMIN QUANTITATIVE: CPT

## 2022-10-14 PROCEDURE — 90715 TDAP VACCINE 7 YRS/> IM: CPT

## 2022-10-14 PROCEDURE — 99212 OFFICE O/P EST SF 10 MIN: CPT | Mod: 25 | Performed by: NURSE PRACTITIONER

## 2022-10-14 PROCEDURE — 90732 PPSV23 VACC 2 YRS+ SUBQ/IM: CPT

## 2022-10-14 RX ORDER — ACETAMINOPHEN 500 MG
500-1000 TABLET ORAL EVERY 6 HOURS PRN
Qty: 30 TABLET | Refills: 0 | Status: SHIPPED | OUTPATIENT
Start: 2022-10-14 | End: 2023-03-28 | Stop reason: SDUPTHER

## 2022-10-14 RX ORDER — ALLOPURINOL 300 MG/1
300 TABLET ORAL 2 TIMES DAILY
Qty: 30 TABLET | Refills: 0 | Status: SHIPPED | OUTPATIENT
Start: 2022-10-14 | End: 2023-01-16

## 2022-10-14 RX ORDER — NAPROXEN SODIUM 275 MG/1
275 TABLET ORAL 2 TIMES DAILY WITH MEALS
Qty: 60 TABLET | Refills: 0 | Status: SHIPPED | OUTPATIENT
Start: 2022-10-14 | End: 2022-12-06 | Stop reason: SDUPTHER

## 2022-10-14 RX ORDER — SIMVASTATIN 20 MG
20 TABLET ORAL NIGHTLY
Qty: 30 TABLET | Refills: 0 | Status: SHIPPED | OUTPATIENT
Start: 2022-10-14 | End: 2023-01-16

## 2022-10-14 ASSESSMENT — FIBROSIS 4 INDEX: FIB4 SCORE: 0.82

## 2022-10-17 PROBLEM — Z76.89 ENCOUNTER TO ESTABLISH CARE: Status: ACTIVE | Noted: 2022-10-17

## 2022-10-17 NOTE — ASSESSMENT & PLAN NOTE
Discussed health history and maintenance   Flu vaccine - Provided  Colon Ca screening - Referral to GI for Colonoscopy  Preventative screening labs recently completed in order to start HIV prevention.

## 2022-10-17 NOTE — PROGRESS NOTES
No chief complaint on file.      Subjective:     HPI:   Divya Damon is a 52 y.o. male here to discuss the evaluation and management of:        Problem   Encounter to Establish Care    Patient here to establish care.  Patient states he has a history of diabetes, gout and atrial fibrillation.  Patient also has history of substance abuse but has been clean off of meth for about 30 days.  Patient follows with [facility] for HIV prevention medication.     Type 2 Diabetes Mellitus Without Complication, Without Long-Term Current Use of Insulin (Hcc)    Patient has been diabetic for some time.  Currently takes 500 mg of metformin twice daily.  Is due for his A1c to be checked.  Patient states he understands what he should be eating to help with his diabetes.     Chronic Idiopathic Gout of Foot    Patient states that he was previously having very frequent flares of his gout, however he has recently changed his diet and has been about 2 months since he has had a flare.  Patient would still like to be referred to pain management to help with pain when gout flares attack as he states the allopurinol, colchicine and naproxen do not help when this happens.  Patient states he previously received Percocet from the ER which significantly improved his pain.         ROS  See HPI       Allergies   Allergen Reactions    Pcn [Penicillins]        Current medicines (including changes today)  Current Outpatient Medications   Medication Sig Dispense Refill    allopurinol (ZYLOPRIM) 300 MG Tab Take 1 Tablet by mouth 2 times a day. 30 Tablet 0    metFORMIN (GLUCOPHAGE) 500 MG Tab Take 1 Tablet by mouth 2 times a day with meals. 60 Tablet 0    naproxen sodium (ANAPROX) 275 MG tablet Take 1 Tablet by mouth 2 times a day with meals. 60 Tablet 0    simvastatin (ZOCOR) 20 MG Tab Take 1 Tablet by mouth every evening. 30 Tablet 0    acetaminophen (TYLENOL) 500 MG Tab Take 1-2 Tablets by mouth every 6 hours as needed for Moderate Pain or  "Mild Pain. 30 Tablet 0    colchicine (COLCRYS) 0.6 MG Tab Take 1 Tablet by mouth every day. 30 Tablet 3     No current facility-administered medications for this visit.       Social History     Tobacco Use    Smoking status: Every Day     Packs/day: 0.50     Types: Cigarettes     Start date: 1985    Smokeless tobacco: Never   Vaping Use    Vaping Use: Never used   Substance Use Topics    Alcohol use: Not Currently     Comment: occ    Drug use: Yes     Types: Methamphetamines     Comment: patient quit meth 09/09/22       Patient Active Problem List    Diagnosis Date Noted    Encounter to establish care 10/17/2022    Other hyperlipidemia 06/07/2022    Paroxysmal atrial fibrillation (HCC) 06/07/2022    Type 2 diabetes mellitus without complication, without long-term current use of insulin (HCC) 06/07/2022    Chronic idiopathic gout of foot 06/07/2022       Family History   Problem Relation Age of Onset    Cancer Mother         Lung    Alzheimer's Disease Mother     Psychiatric Illness Father         PTSD    Stroke Father     Cancer Father         throat    Lung Disease Sister     Cancer Sister         Lung, bones, brain    Diabetes Sister         5 sister T1DM    Psychiatric Illness Brother         PTSD    Diabetes Brother         5 siblings are T2DM          Objective:     /60 (BP Location: Left arm, Patient Position: Sitting, BP Cuff Size: Adult)   Pulse (!) 111   Temp 36.6 °C (97.9 °F) (Skin)   Resp 15   Ht 1.676 m (5' 6\")   Wt 79.7 kg (175 lb 9.6 oz)   SpO2 96%  Body mass index is 28.34 kg/m².    Physical Exam:  Physical Exam  Vitals reviewed.   Constitutional:       General: He is awake.      Appearance: Normal appearance. He is well-developed.   HENT:      Head: Normocephalic.   Eyes:      Conjunctiva/sclera: Conjunctivae normal.   Cardiovascular:      Rate and Rhythm: Normal rate.   Pulmonary:      Effort: Pulmonary effort is normal. No respiratory distress.   Musculoskeletal:      Cervical back: " Neck supple.   Skin:     General: Skin is warm and dry.   Neurological:      Mental Status: He is alert and oriented to person, place, and time.   Psychiatric:         Mood and Affect: Mood normal.         Behavior: Behavior normal. Behavior is cooperative.       Assessment and Plan:     The following treatment plan was discussed:    Problem List Items Addressed This Visit       Type 2 diabetes mellitus without complication, without long-term current use of insulin (HCC)     Controlled-  A1c in office today was 6.4%  We will continue with metformin 500 mg twice daily as he is currently controlled on this regimen.  Encourage patient to watch diet  Had patient provide urine sample to send for urinalysis and microalbumin creatinine ratio         Relevant Medications    metFORMIN (GLUCOPHAGE) 500 MG Tab    Other Relevant Orders    POCT  A1C (Completed)    MICROALBUMIN CREAT RATIO URINE (Completed)    URINALYSIS (Completed)    Chronic idiopathic gout of foot     Ongoing-  Will send referral to pain management and see if they are willing to see patient for this.  Explained to patient that I do not prescribe chronic pain medications, which she verbally understood.  Encouraged to continue with his allopurinol, and naproxen/colchicine if there is any flare.         Relevant Medications    allopurinol (ZYLOPRIM) 300 MG Tab    naproxen sodium (ANAPROX) 275 MG tablet    acetaminophen (TYLENOL) 500 MG Tab    Other Relevant Orders    Referral to Pain Management    Encounter to establish care     Discussed health history and maintenance   Flu vaccine - Provided  Colon Ca screening - Referral to GI for Colonoscopy  Preventative screening labs recently completed in order to start HIV prevention.            Other Visit Diagnoses       Need for vaccination        Relevant Orders    INFLUENZA VACCINE QUAD INJ (PF) (Completed)    Pneumovax Vaccine (PPSV23) (Completed)    Tdap Vaccine =>8YO IM (Completed)    Medication refill         Relevant Medications    naproxen sodium (ANAPROX) 275 MG tablet    Hyperlipidemia, unspecified hyperlipidemia type        Relevant Medications    simvastatin (ZOCOR) 20 MG Tab            Any change or worsening of signs or symptoms, patient encouraged to follow-up or report to emergency room for further evaluation. Patient verbalizes understanding and agrees.    Follow-Up: As needed      PLEASE NOTE: This dictation was created using voice recognition software. I have made every reasonable attempt to correct obvious errors, but I expect that there are errors of grammar and possibly content that I did not discover before finalizing the note.

## 2022-10-17 NOTE — ASSESSMENT & PLAN NOTE
Controlled-  A1c in office today was 6.4%  We will continue with metformin 500 mg twice daily as he is currently controlled on this regimen.  Encourage patient to watch diet  Had patient provide urine sample to send for urinalysis and microalbumin creatinine ratio

## 2022-10-17 NOTE — ASSESSMENT & PLAN NOTE
Ongoing-  Will send referral to pain management and see if they are willing to see patient for this.  Explained to patient that I do not prescribe chronic pain medications, which she verbally understood.  Encouraged to continue with his allopurinol, and naproxen/colchicine if there is any flare.

## 2022-10-25 ENCOUNTER — TELEPHONE (OUTPATIENT)
Dept: MEDICAL GROUP | Facility: MEDICAL CENTER | Age: 52
End: 2022-10-25
Payer: COMMERCIAL

## 2022-10-26 ENCOUNTER — TELEPHONE (OUTPATIENT)
Dept: MEDICAL GROUP | Facility: MEDICAL CENTER | Age: 52
End: 2022-10-26
Payer: COMMERCIAL

## 2022-10-26 NOTE — TELEPHONE ENCOUNTER
Patient stopped by the office, stated he has not be able to get his Naproxen. He stated he was told he needs a prior authorization. Patient stated it could be possible that his insurance is not covering the dosage, but he wasn't too sure. Patient was notified a message would be sent with his request.

## 2022-10-27 NOTE — TELEPHONE ENCOUNTER
DOCUMENTATION OF PAR STATUS:    1. Name of Medication & Dose:  naproxen sodium (ANAPROX) 275 MG tablet       2. Name of Prescription Coverage Company & phone #: [employer]    3. Date Prior Auth Submitted: 10/26/22    4. What information was given to obtain insurance decision? Chart notes, icd-10 code, meds check.    5. Prior Auth Status? Pending    6. Patient Notified: yes

## 2022-12-06 ENCOUNTER — HOSPITAL ENCOUNTER (EMERGENCY)
Facility: MEDICAL CENTER | Age: 52
End: 2022-12-06
Attending: EMERGENCY MEDICINE
Payer: COMMERCIAL

## 2022-12-06 VITALS
WEIGHT: 171.96 LBS | SYSTOLIC BLOOD PRESSURE: 151 MMHG | DIASTOLIC BLOOD PRESSURE: 89 MMHG | BODY MASS INDEX: 27.64 KG/M2 | HEIGHT: 66 IN | RESPIRATION RATE: 16 BRPM | HEART RATE: 92 BPM | TEMPERATURE: 97.8 F | OXYGEN SATURATION: 97 %

## 2022-12-06 DIAGNOSIS — M25.572 ACUTE LEFT ANKLE PAIN: ICD-10-CM

## 2022-12-06 DIAGNOSIS — Z76.0 MEDICATION REFILL: ICD-10-CM

## 2022-12-06 DIAGNOSIS — M1A.0790 IDIOPATHIC CHRONIC GOUT OF FOOT WITHOUT TOPHUS, UNSPECIFIED LATERALITY: ICD-10-CM

## 2022-12-06 DIAGNOSIS — M10.9 ACUTE GOUT OF LEFT ANKLE, UNSPECIFIED CAUSE: ICD-10-CM

## 2022-12-06 PROCEDURE — 99283 EMERGENCY DEPT VISIT LOW MDM: CPT

## 2022-12-06 RX ORDER — NAPROXEN SODIUM 275 MG/1
275 TABLET ORAL 2 TIMES DAILY WITH MEALS
Qty: 20 TABLET | Refills: 0 | Status: SHIPPED | OUTPATIENT
Start: 2022-12-06 | End: 2022-12-13

## 2022-12-06 RX ORDER — COLCHICINE 0.6 MG/1
TABLET ORAL
Qty: 3 TABLET | Refills: 0 | Status: SHIPPED | OUTPATIENT
Start: 2022-12-06

## 2022-12-06 RX ORDER — COLCHICINE 0.6 MG/1
0.6 TABLET ORAL DAILY
Qty: 30 TABLET | Refills: 0 | Status: SHIPPED | OUTPATIENT
Start: 2022-12-06 | End: 2023-03-28 | Stop reason: SDUPTHER

## 2022-12-06 RX ORDER — METHYLPREDNISOLONE 4 MG/1
TABLET ORAL
Qty: 1 EACH | Refills: 0 | Status: SHIPPED | OUTPATIENT
Start: 2022-12-06 | End: 2023-07-28 | Stop reason: SDUPTHER

## 2022-12-06 ASSESSMENT — FIBROSIS 4 INDEX: FIB4 SCORE: 0.82

## 2022-12-06 NOTE — ED NOTES
Pt sitting upright in chair in no obvious distress at this time. Discharge information and instructions given/discussed with Pt. Pt acknowledged information. Pt self ambulated  using crutches to ER lobby without difficulty for his ride.

## 2022-12-06 NOTE — ED TRIAGE NOTES
"Chief Complaint   Patient presents with    Foot Pain     Patient reports L foot pain. Patient has hx of gout and states this is a flair up. Patient is requesting prednisone pack and crutches       53 yo male to triage for above complaint. Patient reports he tried to get into his PCP but they were unable to see him.    Pt is alert and oriented, speaking in full sentences, follows commands and responds appropriately to questions.     Patient placed back in lobby and educated on triage process. Asked to inform RN of any changes.    BP (!) 136/94   Pulse 99   Temp 36.6 °C (97.9 °F) (Temporal)   Resp 16   Ht 1.676 m (5' 6\")   Wt 78 kg (171 lb 15.3 oz)   SpO2 98%   BMI 27.75 kg/m²     "

## 2022-12-06 NOTE — ED PROVIDER NOTES
ED Physician Note    Chief Concern:   Left ankle pain    HPI:  Divya Damon is a very pleasant 52-year-old gentleman who presents to the emergency department for an evaluation of a gout flare.  He has a longstanding history of gout, and has been controlling it pretty well with lifestyle modification.  2 to 3 days ago he started developing pain in the left ankle, which is a common location for his gout.  Pain has progressively worsened, he is tried taking Naprosyn at home however he has not had any significant improvement.  He does have colchicine at home, he tried taking 1 tablet a day and that has not helped his symptoms either.  He states that a Medrol Dosepak will usually alleviate his symptoms, and seems to work the best for him.  He is also requesting crutches, as it is negative for him to ambulate while he is waiting for the medications to take effect.    Review of Systems:  See HPI for pertinent positives and negatives.    Past Medical History:   has a past medical history of Gout, Other hyperlipidemia, Paroxysmal atrial fibrillation (HCC), and Type 2 diabetes mellitus without complication, without long-term current use of insulin (HCC).    Social History:  Social History     Tobacco Use    Smoking status: Every Day     Packs/day: 0.50     Types: Cigarettes     Start date: 1985    Smokeless tobacco: Never   Vaping Use    Vaping Use: Never used   Substance and Sexual Activity    Alcohol use: Not Currently     Comment: occ    Drug use: Yes     Types: Methamphetamines     Comment: patient quit meth 09/09/22    Sexual activity: Yes     Partners: Male     Birth control/protection: Condom       Surgical History:   has a past surgical history that includes lymph node excision.    Current Medications:  Home Medications       Reviewed by Vivian Kowalski R.N. (Registered Nurse) on 12/06/22 at 1152  Med List Status: <None>     Medication Last Dose Status   acetaminophen (TYLENOL) 500 MG Tab  Active  "  allopurinol (ZYLOPRIM) 300 MG Tab  Active   colchicine (COLCRYS) 0.6 MG Tab  Active   metFORMIN (GLUCOPHAGE) 500 MG Tab  Active   naproxen sodium (ANAPROX) 275 MG tablet  Active   simvastatin (ZOCOR) 20 MG Tab  Active                    Allergies:  Allergies   Allergen Reactions    Pcn [Penicillins]        Physical Exam:  Vital Signs: BP (!) 151/89   Pulse 92   Temp 36.6 °C (97.8 °F) (Temporal)   Resp 16   Ht 1.676 m (5' 6\")   Wt 78 kg (171 lb 15.3 oz)   SpO2 97%   BMI 27.75 kg/m²   Constitutional: Alert, no acute distress  HENT: Atraumatic  Neck: Normal range of motion  Cardiovascular: Lower extremity warm, well perfused  Pulmonary: Normal work of breathing  Skin: Skin findings as documented musculoskeletal exam  Musculoskeletal: Left ankle with mild soft tissue tenderness to palpation along the medial and lateral aspect of the ankle with some exacerbation with range of motion.  He is able to range the ankle, he has no significant overlying abnormal warmth, no overlying cellulitis.  2+ DP pulse present, foot is warm and well-perfused with normal capillary refill time in the digits.  No obvious deformity, no evidence of trauma.  Sensory and motor function is intact.  Neurologic: Left foot with normal sensory and motor function        Medical records reviewed for continuity of care. Mr. Damon was seen in primary care clinic 10/14/2022.  APRN note reviewed from that visit.  He has history of diabetes, gout, and atrial fibrillation.  Noted that he had previously been having very frequent flares of his gout, however it seems as though his symptoms improved when he changed his diet.  He states that allopurinol, colchicine, naproxen do not help significantly when his gout flares.  He did note that Percocet did seem to help his pain.    MDM:  Mr. Damon presents to the emergency department today for evaluation of left ankle pain consistent with a gout flare.  He has longstanding history of gout, states usually " involves his ankles, symptoms are generally identical to his symptoms today.  With regard to my differential diagnosis I did consider septic joint, however he has no fevers, no significant pain with range of motion, and given his history I believe gout is much more likely.  He has no history of trauma, no evidence of fracture or dislocation.    He is requesting Percocet and a Medrol Dosepak, I believe the risk of opiate pain medications outweigh the benefit for chronic conditions such as gout.  I did provide a Medrol Dosepak.  He does have colchicine which he was taking once daily at 1 point but was not helping, we discussed 1.2 mg dosing followed by 0.6 mg dosing 1 hour later, he will try this.  Also states the Medrol Dosepak typically helps his pain.  I provided him with a refill of his chronic Naprosyn, discussed not taking this while he is taking steroid medications.  At this time I do believe he is stable for discharge home, he can follow-up with his primary care physician for complete recheck within 2 to 3 days. Return precautions were discussed with the patient, and provided in written form with the patient's discharge instructions.       Disposition:  Discharge home in stable condition    Final Impression:  1. Acute gout of left ankle, unspecified cause    2. Acute left ankle pain    3. Medication refill    4. Idiopathic chronic gout of foot without tophus, unspecified laterality        Electronically signed by Macy Silvestre MD

## 2022-12-06 NOTE — DISCHARGE INSTRUCTIONS
Please follow-up with your primary care practitioner in 2 to 3 days for complete recheck.  Return to the emergency department if you develop any new or worsening symptoms including worsening pain, swelling, numbness or tingling, if you are not able to bear weight, or if your pain persists or worsens despite the medication prescribed.  Additionally please return immediately if you develop any fevers, or elevated blood sugars.

## 2022-12-13 ENCOUNTER — OFFICE VISIT (OUTPATIENT)
Dept: MEDICAL GROUP | Facility: MEDICAL CENTER | Age: 52
End: 2022-12-13
Attending: NURSE PRACTITIONER
Payer: COMMERCIAL

## 2022-12-13 VITALS
TEMPERATURE: 98.4 F | HEIGHT: 66 IN | OXYGEN SATURATION: 97 % | WEIGHT: 174.9 LBS | SYSTOLIC BLOOD PRESSURE: 128 MMHG | HEART RATE: 106 BPM | BODY MASS INDEX: 28.11 KG/M2 | DIASTOLIC BLOOD PRESSURE: 72 MMHG | RESPIRATION RATE: 16 BRPM

## 2022-12-13 DIAGNOSIS — M1A.0790 IDIOPATHIC CHRONIC GOUT OF FOOT WITHOUT TOPHUS, UNSPECIFIED LATERALITY: ICD-10-CM

## 2022-12-13 PROCEDURE — 99213 OFFICE O/P EST LOW 20 MIN: CPT | Performed by: NURSE PRACTITIONER

## 2022-12-13 PROCEDURE — 99212 OFFICE O/P EST SF 10 MIN: CPT | Performed by: NURSE PRACTITIONER

## 2022-12-13 RX ORDER — MELOXICAM 15 MG/1
15 TABLET ORAL DAILY
Qty: 30 TABLET | Refills: 0 | Status: SHIPPED | OUTPATIENT
Start: 2022-12-13

## 2022-12-13 ASSESSMENT — FIBROSIS 4 INDEX: FIB4 SCORE: 0.82

## 2022-12-13 NOTE — PROGRESS NOTES
No chief complaint on file.      Subjective:     HPI:   Divya Damon is a 52 y.o. male here to discuss the evaluation and management of:      Problem   Chronic Idiopathic Gout of Foot    Patient recently went to the emergency room secondary to gout flare in his ankle.  Patient states it was so bad that he could barely even walk.  Thought that originally he had strained his ankle however it did not go away.  Patient was unable to get the naproxen secondary to insurance not wanting to cover it.  In the emergency room he was given pain medication as well as a Medrol Dosepak which has cleared up his gout significantly.  He was also provided crutches at that time.  Would like to discuss further management with medication at this time.         ROS  See HPI     Allergies   Allergen Reactions    Pcn [Penicillins]        Current medicines (including changes today)  Current Outpatient Medications   Medication Sig Dispense Refill    meloxicam (MOBIC) 15 MG tablet Take 1 Tablet by mouth every day. 30 Tablet 0    methylPREDNISolone (MEDROL DOSEPAK) 4 MG Tablet Therapy Pack Use as directed 1 Each 0    colchicine (COLCRYS) 0.6 MG Tab Take 1 Tablet by mouth every day. 30 Tablet 0    colchicine (COLCRYS) 0.6 MG Tab Please take 2 tablets, then 1 tablet 1 hour later. 3 Tablet 0    allopurinol (ZYLOPRIM) 300 MG Tab Take 1 Tablet by mouth 2 times a day. 30 Tablet 0    metFORMIN (GLUCOPHAGE) 500 MG Tab Take 1 Tablet by mouth 2 times a day with meals. 60 Tablet 0    simvastatin (ZOCOR) 20 MG Tab Take 1 Tablet by mouth every evening. 30 Tablet 0    acetaminophen (TYLENOL) 500 MG Tab Take 1-2 Tablets by mouth every 6 hours as needed for Moderate Pain or Mild Pain. 30 Tablet 0     No current facility-administered medications for this visit.       Social History     Tobacco Use    Smoking status: Every Day     Packs/day: 0.50     Types: Cigarettes     Start date: 1985    Smokeless tobacco: Never   Vaping Use    Vaping Use: Never used  "  Substance Use Topics    Alcohol use: Not Currently     Comment: occ    Drug use: Yes     Types: Methamphetamines     Comment: patient quit meth 09/09/22       Patient Active Problem List    Diagnosis Date Noted    Encounter to establish care 10/17/2022    Other hyperlipidemia 06/07/2022    Paroxysmal atrial fibrillation (HCC) 06/07/2022    Type 2 diabetes mellitus without complication, without long-term current use of insulin (HCC) 06/07/2022    Chronic idiopathic gout of foot 06/07/2022       Family History   Problem Relation Age of Onset    Cancer Mother         Lung    Alzheimer's Disease Mother     Psychiatric Illness Father         PTSD    Stroke Father     Cancer Father         throat    Lung Disease Sister     Cancer Sister         Lung, bones, brain    Diabetes Sister         5 sister T1DM    Psychiatric Illness Brother         PTSD    Diabetes Brother         5 siblings are T2DM          Objective:     /72 (BP Location: Right arm, Patient Position: Sitting, BP Cuff Size: Adult)   Pulse (!) 106   Temp 36.9 °C (98.4 °F) (Temporal)   Resp 16   Ht 1.676 m (5' 6\")   Wt 79.3 kg (174 lb 14.4 oz)   SpO2 97%  Body mass index is 28.23 kg/m².    Physical Exam:  Physical Exam  Vitals reviewed.   Constitutional:       General: He is awake.      Appearance: Normal appearance. He is well-developed.   HENT:      Head: Normocephalic.   Eyes:      Conjunctiva/sclera: Conjunctivae normal.   Cardiovascular:      Rate and Rhythm: Normal rate.   Pulmonary:      Effort: Pulmonary effort is normal. No respiratory distress.   Musculoskeletal:      Cervical back: Neck supple.   Skin:     General: Skin is warm and dry.   Neurological:      Mental Status: He is alert and oriented to person, place, and time.   Psychiatric:         Mood and Affect: Mood normal.         Behavior: Behavior normal. Behavior is cooperative.            Assessment and Plan:     The following treatment plan was discussed:    Problem List Items " Addressed This Visit       Chronic idiopathic gout of foot     Ongoing-  Discussed colchicine use with patient and how to take appropriately at the first onset of gout symptoms to avoid full-blown gout attack.    Discussed dietary changes and sticking to them to avoid gout flareups  Have changed his pain medication to meloxicam to take as needed during gout flares  In the future patient will come in and see me or alert me that he is having a gout flare and we will discuss possible Medrol Dosepak at that time.         Relevant Medications    meloxicam (MOBIC) 15 MG tablet       Any change or worsening of signs or symptoms, patient encouraged to follow-up or report to emergency room for further evaluation. Patient verbalizes understanding and agrees.    Follow-Up: Return in about 3 months (around 3/13/2023).      PLEASE NOTE: This dictation was created using voice recognition software. I have made every reasonable attempt to correct obvious errors, but I expect that there are errors of grammar and possibly content that I did not discover before finalizing the note.

## 2022-12-13 NOTE — ASSESSMENT & PLAN NOTE
Ongoing-  Discussed colchicine use with patient and how to take appropriately at the first onset of gout symptoms to avoid full-blown gout attack.    Discussed dietary changes and sticking to them to avoid gout flareups  Have changed his pain medication to meloxicam to take as needed during gout flares  In the future patient will come in and see me or alert me that he is having a gout flare and we will discuss possible Medrol Dosepak at that time.

## 2023-01-16 DIAGNOSIS — M1A.0790 IDIOPATHIC CHRONIC GOUT OF FOOT WITHOUT TOPHUS, UNSPECIFIED LATERALITY: ICD-10-CM

## 2023-01-16 DIAGNOSIS — E78.5 HYPERLIPIDEMIA, UNSPECIFIED HYPERLIPIDEMIA TYPE: ICD-10-CM

## 2023-01-16 DIAGNOSIS — E11.9 TYPE 2 DIABETES MELLITUS WITHOUT COMPLICATION, WITHOUT LONG-TERM CURRENT USE OF INSULIN (HCC): ICD-10-CM

## 2023-01-16 RX ORDER — SIMVASTATIN 20 MG
TABLET ORAL
Qty: 30 TABLET | Refills: 0 | Status: SHIPPED | OUTPATIENT
Start: 2023-01-16 | End: 2023-03-28 | Stop reason: SDUPTHER

## 2023-01-16 RX ORDER — ALLOPURINOL 300 MG/1
TABLET ORAL
Qty: 30 TABLET | Refills: 0 | Status: SHIPPED | OUTPATIENT
Start: 2023-01-16 | End: 2023-03-28 | Stop reason: SDUPTHER

## 2023-01-16 NOTE — TELEPHONE ENCOUNTER
Received request via: Pharmacy    Was the patient seen in the last year in this department? Yes    Does the patient have an active prescription (recently filled or refills available) for medication(s) requested? No    Does the patient have residential Plus and need 100 day supply (blood pressure, diabetes and cholesterol meds only)? Medication is not for cholesterol, blood pressure or diabetes and Patient does not have SCP

## 2023-03-28 ENCOUNTER — OFFICE VISIT (OUTPATIENT)
Dept: MEDICAL GROUP | Facility: MEDICAL CENTER | Age: 53
End: 2023-03-28
Attending: NURSE PRACTITIONER
Payer: MEDICAID

## 2023-03-28 VITALS
TEMPERATURE: 96.9 F | SYSTOLIC BLOOD PRESSURE: 110 MMHG | DIASTOLIC BLOOD PRESSURE: 82 MMHG | HEART RATE: 89 BPM | BODY MASS INDEX: 30.62 KG/M2 | WEIGHT: 190.5 LBS | RESPIRATION RATE: 18 BRPM | OXYGEN SATURATION: 96 % | HEIGHT: 66 IN

## 2023-03-28 DIAGNOSIS — E78.49 OTHER HYPERLIPIDEMIA: ICD-10-CM

## 2023-03-28 DIAGNOSIS — E78.5 HYPERLIPIDEMIA, UNSPECIFIED HYPERLIPIDEMIA TYPE: ICD-10-CM

## 2023-03-28 DIAGNOSIS — M10.9 ACUTE GOUT OF LEFT ANKLE, UNSPECIFIED CAUSE: ICD-10-CM

## 2023-03-28 DIAGNOSIS — M1A.0790 IDIOPATHIC CHRONIC GOUT OF FOOT WITHOUT TOPHUS, UNSPECIFIED LATERALITY: ICD-10-CM

## 2023-03-28 DIAGNOSIS — E11.9 TYPE 2 DIABETES MELLITUS WITHOUT COMPLICATION, WITHOUT LONG-TERM CURRENT USE OF INSULIN (HCC): ICD-10-CM

## 2023-03-28 LAB
HBA1C MFR BLD: 7 % (ref ?–5.8)
POCT INT CON NEG: NEGATIVE
POCT INT CON POS: POSITIVE

## 2023-03-28 PROCEDURE — 99214 OFFICE O/P EST MOD 30 MIN: CPT | Performed by: NURSE PRACTITIONER

## 2023-03-28 PROCEDURE — 83036 HEMOGLOBIN GLYCOSYLATED A1C: CPT | Performed by: NURSE PRACTITIONER

## 2023-03-28 RX ORDER — SIMVASTATIN 20 MG
20 TABLET ORAL EVERY EVENING
Qty: 30 TABLET | Refills: 2 | Status: SHIPPED | OUTPATIENT
Start: 2023-03-28 | End: 2023-03-28

## 2023-03-28 RX ORDER — ACETAMINOPHEN 500 MG
500-1000 TABLET ORAL EVERY 6 HOURS PRN
Qty: 30 TABLET | Refills: 2 | Status: SHIPPED | OUTPATIENT
Start: 2023-03-28

## 2023-03-28 RX ORDER — ALLOPURINOL 300 MG/1
300 TABLET ORAL 2 TIMES DAILY
Qty: 30 TABLET | Refills: 0 | Status: SHIPPED | OUTPATIENT
Start: 2023-03-28 | End: 2023-03-28

## 2023-03-28 RX ORDER — ALLOPURINOL 300 MG/1
300 TABLET ORAL 2 TIMES DAILY
Qty: 30 TABLET | Refills: 4 | Status: SHIPPED | OUTPATIENT
Start: 2023-03-28

## 2023-03-28 RX ORDER — SIMVASTATIN 20 MG
20 TABLET ORAL EVERY EVENING
Qty: 30 TABLET | Refills: 4 | Status: SHIPPED | OUTPATIENT
Start: 2023-03-28 | End: 2023-08-08

## 2023-03-28 RX ORDER — COLCHICINE 0.6 MG/1
0.6 TABLET ORAL DAILY
Qty: 30 TABLET | Refills: 2 | Status: SHIPPED | OUTPATIENT
Start: 2023-03-28

## 2023-03-28 RX ORDER — ACETAMINOPHEN 500 MG
500-1000 TABLET ORAL EVERY 6 HOURS PRN
Qty: 30 TABLET | Refills: 0 | Status: SHIPPED | OUTPATIENT
Start: 2023-03-28 | End: 2023-03-28

## 2023-03-28 ASSESSMENT — PATIENT HEALTH QUESTIONNAIRE - PHQ9: CLINICAL INTERPRETATION OF PHQ2 SCORE: 0

## 2023-03-28 ASSESSMENT — FIBROSIS 4 INDEX: FIB4 SCORE: 0.82

## 2023-03-28 NOTE — ASSESSMENT & PLAN NOTE
Ongoing-  Refilled preventative medications  Encourage patient to continue his current diet and avoid any triggers for his gout.

## 2023-03-28 NOTE — ASSESSMENT & PLAN NOTE
Ongoing-  Refilled simvastatin and will check lipid profile, if LDL remains above 100 and will increase simvastatin dosage.

## 2023-03-28 NOTE — PROGRESS NOTES
Chief Complaint   Patient presents with    Follow-Up       Subjective:     HPI:   Divya Damon is a 52 y.o. male here to discuss the evaluation and management of:      Problem   Other Hyperlipidemia    Patient due for yearly screening labs.  Has been taking his medications but ran out.  Patient currently needs refills on his simvastatin.     Type 2 Diabetes Mellitus Without Complication, Without Long-Term Current Use of Insulin (Hcc)    Patient states he has been doing well with his diabetes medications.  Except that he ran out.  Patient takes metformin 500 mg twice daily and has been watching his diet.     Chronic Idiopathic Gout of Foot    Patient here in clinic today for refills on medications.  Patient states that he has not had a gout flareup in quite some time secondary to quitting his liver boone that he used to eat pretty frequently. He is currently out of his medications but is doing well at this time.          ROS  See HPI     Allergies   Allergen Reactions    Pcn [Penicillins]        Current medicines (including changes today)  Current Outpatient Medications   Medication Sig Dispense Refill    colchicine (COLCRYS) 0.6 MG Tab Take 1 Tablet by mouth every day. 30 Tablet 2    simvastatin (ZOCOR) 20 MG Tab Take 1 Tablet by mouth every evening. 30 Tablet 4    metFORMIN (GLUCOPHAGE) 500 MG Tab Take 1 Tablet by mouth 2 times a day with meals. 60 Tablet 5    allopurinol (ZYLOPRIM) 300 MG Tab Take 1 Tablet by mouth 2 times a day. 30 Tablet 4    acetaminophen (TYLENOL) 500 MG Tab Take 1-2 Tablets by mouth every 6 hours as needed for Moderate Pain or Mild Pain. 30 Tablet 2    meloxicam (MOBIC) 15 MG tablet Take 1 Tablet by mouth every day. 30 Tablet 0    methylPREDNISolone (MEDROL DOSEPAK) 4 MG Tablet Therapy Pack Use as directed 1 Each 0    colchicine (COLCRYS) 0.6 MG Tab Please take 2 tablets, then 1 tablet 1 hour later. 3 Tablet 0     No current facility-administered medications for this visit.  "      Social History     Tobacco Use    Smoking status: Every Day     Packs/day: 0.50     Types: Cigarettes     Start date: 1985    Smokeless tobacco: Never   Vaping Use    Vaping Use: Never used   Substance Use Topics    Alcohol use: Not Currently     Comment: occ    Drug use: Yes     Types: Methamphetamines     Comment: patient quit meth 09/09/22       Patient Active Problem List    Diagnosis Date Noted    Encounter to establish care 10/17/2022    Other hyperlipidemia 06/07/2022    Paroxysmal atrial fibrillation (HCC) 06/07/2022    Type 2 diabetes mellitus without complication, without long-term current use of insulin (HCC) 06/07/2022    Chronic idiopathic gout of foot 06/07/2022       Family History   Problem Relation Age of Onset    Cancer Mother         Lung    Alzheimer's Disease Mother     Psychiatric Illness Father         PTSD    Stroke Father     Cancer Father         throat    Lung Disease Sister     Cancer Sister         Lung, bones, brain    Diabetes Sister         5 sister T1DM    Psychiatric Illness Brother         PTSD    Diabetes Brother         5 siblings are T2DM          Objective:     /82 (BP Location: Left arm, Patient Position: Sitting, BP Cuff Size: Adult)   Pulse 89   Temp 36.1 °C (96.9 °F) (Temporal)   Resp 18   Ht 1.676 m (5' 6\")   Wt 86.4 kg (190 lb 8 oz)   SpO2 96%  Body mass index is 30.75 kg/m².    Physical Exam:  Physical Exam  Vitals reviewed.   Constitutional:       General: He is awake.      Appearance: Normal appearance. He is well-developed.   HENT:      Head: Normocephalic.   Eyes:      Conjunctiva/sclera: Conjunctivae normal.   Cardiovascular:      Rate and Rhythm: Normal rate.   Pulmonary:      Effort: Pulmonary effort is normal. No respiratory distress.   Musculoskeletal:      Cervical back: Neck supple.   Feet:      Right foot:      Protective Sensation: 10 sites tested.  10 sites sensed.      Left foot:      Protective Sensation: 10 sites tested.  10 sites " sensed.   Skin:     General: Skin is warm and dry.   Neurological:      Mental Status: He is alert and oriented to person, place, and time.   Psychiatric:         Mood and Affect: Mood normal.         Behavior: Behavior normal. Behavior is cooperative.            Assessment and Plan:     The following treatment plan was discussed:    Problem List Items Addressed This Visit       Other hyperlipidemia     Ongoing-  Refilled simvastatin and will check lipid profile, if LDL remains above 100 and will increase simvastatin dosage.         Relevant Medications    simvastatin (ZOCOR) 20 MG Tab    Type 2 diabetes mellitus without complication, without long-term current use of insulin (HCC)     Ongoing-  Hemoglobin A1c in office today was 7.0  Retinal eye exam completed in office  Microfilament exam completed and normal.  Refilled medications including his metformin and encouraged him to continue with his diet and exercise.         Relevant Medications    metFORMIN (GLUCOPHAGE) 500 MG Tab    Other Relevant Orders    Diabetic Monofilament LE Exam (Completed)    POCT Retinal Eye Exam    POCT Hemoglobin A1C (Completed)    Comp Metabolic Panel    Lipid Profile    Chronic idiopathic gout of foot     Ongoing-  Refilled preventative medications  Encourage patient to continue his current diet and avoid any triggers for his gout.         Relevant Medications    colchicine (COLCRYS) 0.6 MG Tab    allopurinol (ZYLOPRIM) 300 MG Tab    acetaminophen (TYLENOL) 500 MG Tab     Other Visit Diagnoses       Acute gout of left ankle, unspecified cause        Relevant Medications    colchicine (COLCRYS) 0.6 MG Tab    allopurinol (ZYLOPRIM) 300 MG Tab    acetaminophen (TYLENOL) 500 MG Tab    Hyperlipidemia, unspecified hyperlipidemia type        Relevant Medications    simvastatin (ZOCOR) 20 MG Tab            Any change or worsening of signs or symptoms, patient encouraged to follow-up or report to emergency room for further evaluation. Patient  verbalizes understanding and agrees.    Follow-Up: 3 to 6 months or sooner if needed.      PLEASE NOTE: This dictation was created using voice recognition software. I have made every reasonable attempt to correct obvious errors, but I expect that there are errors of grammar and possibly content that I did not discover before finalizing the note.

## 2023-03-28 NOTE — ASSESSMENT & PLAN NOTE
Ongoing-  Hemoglobin A1c in office today was 7.0  Retinal eye exam completed in office  Microfilament exam completed and normal.  Refilled medications including his metformin and encouraged him to continue with his diet and exercise.

## 2023-07-28 DIAGNOSIS — M10.9 ACUTE GOUT OF LEFT ANKLE, UNSPECIFIED CAUSE: ICD-10-CM

## 2023-07-31 RX ORDER — METHYLPREDNISOLONE 4 MG/1
TABLET ORAL
Qty: 1 EACH | Refills: 0 | Status: SHIPPED | OUTPATIENT
Start: 2023-07-31

## 2023-08-03 DIAGNOSIS — E78.5 HYPERLIPIDEMIA, UNSPECIFIED HYPERLIPIDEMIA TYPE: ICD-10-CM

## 2023-08-08 RX ORDER — SIMVASTATIN 20 MG
20 TABLET ORAL EVERY EVENING
Qty: 30 TABLET | Refills: 0 | Status: SHIPPED | OUTPATIENT
Start: 2023-08-08

## 2025-01-06 ENCOUNTER — OFFICE VISIT (OUTPATIENT)
Dept: MEDICAL GROUP | Facility: MEDICAL CENTER | Age: 55
End: 2025-01-06
Attending: NURSE PRACTITIONER
Payer: MEDICAID

## 2025-01-06 ENCOUNTER — PHARMACY VISIT (OUTPATIENT)
Dept: PHARMACY | Facility: MEDICAL CENTER | Age: 55
End: 2025-01-06
Payer: COMMERCIAL

## 2025-01-06 VITALS
BODY MASS INDEX: 28.93 KG/M2 | TEMPERATURE: 96.8 F | RESPIRATION RATE: 16 BRPM | HEIGHT: 66 IN | OXYGEN SATURATION: 95 % | SYSTOLIC BLOOD PRESSURE: 116 MMHG | HEART RATE: 104 BPM | WEIGHT: 180 LBS | DIASTOLIC BLOOD PRESSURE: 62 MMHG

## 2025-01-06 DIAGNOSIS — E78.5 HYPERLIPIDEMIA, UNSPECIFIED HYPERLIPIDEMIA TYPE: ICD-10-CM

## 2025-01-06 DIAGNOSIS — E78.49 OTHER HYPERLIPIDEMIA: ICD-10-CM

## 2025-01-06 DIAGNOSIS — Z11.3 ROUTINE SCREENING FOR STI (SEXUALLY TRANSMITTED INFECTION): ICD-10-CM

## 2025-01-06 DIAGNOSIS — G47.00 INSOMNIA, UNSPECIFIED TYPE: ICD-10-CM

## 2025-01-06 DIAGNOSIS — Z11.59 NEED FOR HEPATITIS C SCREENING TEST: ICD-10-CM

## 2025-01-06 DIAGNOSIS — E11.9 TYPE 2 DIABETES MELLITUS WITHOUT COMPLICATION, WITHOUT LONG-TERM CURRENT USE OF INSULIN (HCC): ICD-10-CM

## 2025-01-06 DIAGNOSIS — M1A.0790 IDIOPATHIC CHRONIC GOUT OF FOOT WITHOUT TOPHUS, UNSPECIFIED LATERALITY: ICD-10-CM

## 2025-01-06 DIAGNOSIS — M10.9 ACUTE GOUT OF LEFT ANKLE, UNSPECIFIED CAUSE: ICD-10-CM

## 2025-01-06 DIAGNOSIS — Z13.6 SCREENING FOR CARDIOVASCULAR CONDITION: ICD-10-CM

## 2025-01-06 PROCEDURE — 99212 OFFICE O/P EST SF 10 MIN: CPT | Performed by: NURSE PRACTITIONER

## 2025-01-06 PROCEDURE — 3078F DIAST BP <80 MM HG: CPT | Performed by: NURSE PRACTITIONER

## 2025-01-06 PROCEDURE — 3074F SYST BP LT 130 MM HG: CPT | Performed by: NURSE PRACTITIONER

## 2025-01-06 PROCEDURE — RXMED WILLOW AMBULATORY MEDICATION CHARGE: Performed by: NURSE PRACTITIONER

## 2025-01-06 PROCEDURE — 99214 OFFICE O/P EST MOD 30 MIN: CPT | Performed by: NURSE PRACTITIONER

## 2025-01-06 RX ORDER — ALLOPURINOL 300 MG/1
300 TABLET ORAL 2 TIMES DAILY
Qty: 30 TABLET | Refills: 4 | Status: SHIPPED | OUTPATIENT
Start: 2025-01-06

## 2025-01-06 RX ORDER — ATORVASTATIN CALCIUM 40 MG/1
40 TABLET, FILM COATED ORAL NIGHTLY
Qty: 90 TABLET | Refills: 1 | Status: SHIPPED | OUTPATIENT
Start: 2025-01-06 | End: 2025-01-09 | Stop reason: SDUPTHER

## 2025-01-06 RX ORDER — GEMFIBROZIL 600 MG/1
600 TABLET, FILM COATED ORAL 2 TIMES DAILY
COMMUNITY
Start: 2024-12-31 | End: 2025-01-06 | Stop reason: SDUPTHER

## 2025-01-06 RX ORDER — DOXEPIN HYDROCHLORIDE 10 MG/1
10 CAPSULE ORAL NIGHTLY
Qty: 30 CAPSULE | Refills: 1 | Status: SHIPPED | OUTPATIENT
Start: 2025-01-06

## 2025-01-06 RX ORDER — METHYLPREDNISOLONE 4 MG/1
TABLET ORAL
Qty: 21 EACH | Refills: 0 | Status: SHIPPED | OUTPATIENT
Start: 2025-01-06

## 2025-01-06 RX ORDER — ACETAMINOPHEN 500 MG
500-1000 TABLET ORAL EVERY 6 HOURS PRN
Qty: 60 TABLET | Refills: 0 | Status: SHIPPED | OUTPATIENT
Start: 2025-01-06 | End: 2025-01-30

## 2025-01-06 RX ORDER — GEMFIBROZIL 600 MG/1
600 TABLET, FILM COATED ORAL 2 TIMES DAILY
Qty: 60 TABLET | Refills: 1 | Status: SHIPPED | OUTPATIENT
Start: 2025-01-06 | End: 2025-01-08

## 2025-01-06 RX ORDER — IBUPROFEN 600 MG/1
600 TABLET, FILM COATED ORAL EVERY 6 HOURS PRN
Qty: 90 TABLET | Refills: 1 | Status: SHIPPED | OUTPATIENT
Start: 2025-01-06

## 2025-01-06 ASSESSMENT — PATIENT HEALTH QUESTIONNAIRE - PHQ9: CLINICAL INTERPRETATION OF PHQ2 SCORE: 0

## 2025-01-06 NOTE — PROGRESS NOTES
Verbal consent was acquired by the patient to use [employer] ambient listening note generation during this visit     Chief Complaint   Patient presents with    Medication Refill       Subjective:     HPI:   History of Present Illness  The patient presents for evaluation of gout, insomnia, diabetes, and hyperlipidemia.    He has been abstinent from substances for the past 8 months, following a period of treatment. [facility] is [facility] and is [facility] seeking [facility]. He was unable to complete the previously ordered lab work due to unemployment but is now prepared to do so. He is requesting a refill of his medications.    His last gout episode occurred in December 2024 during his treatment period. He is requesting a prednisone pack for potential future flare-ups. He has not been prescribed colchicine and is seeking its addition to his treatment regimen. He is on allopurinol 300 mg once daily.    He experienced an allergic reaction to trazodone, which led to the prescription of Vistaril (hydroxyzine). However, he reports that Vistaril is ineffective and has discontinued its use. He is seeking an alternative medication for sleep, as Seroquel also caused an adverse reaction. He has previously used Ambien and is considering its reintroduction.    He was first administered insulin while incarcerated when his blood glucose level was 334. Since then, his levels have ranged between 82 and 102. He has been compliant with his metformin regimen but has exhausted his supply over the past 3 days. He is on metformin 500 mg twice daily.    He was initiated on gemfibrozil 600 mg in the second week of November 2024 and reports no side effects. He is also taking atorvastatin without any associated muscle aches.    SOCIAL HISTORY  The patient is 8 months clean and sober. He is currently residing [facility] and is actively seeking  employment.    ALLERGIES  The patient had an allergic reaction to TRAZODONE.    MEDICATIONS  Current: Allopurinol 300 mg, metformin 500 mg twice a day, gemfibrozil 600 mg, atorvastatin, hydroxyzine.  Discontinued: Trazodone.        No problems updated.    ROS  See HPI     Allergies   Allergen Reactions    Pcn [Penicillins]     Trazodone        Current medicines (including changes today)  Current Outpatient Medications   Medication Sig Dispense Refill    allopurinol (ZYLOPRIM) 300 MG Tab Take 1 Tablet by mouth 2 times a day. 30 Tablet 4    metFORMIN (GLUCOPHAGE) 500 MG Tab Take 1 Tablet by mouth 2 times a day with meals. 60 Tablet 5    atorvastatin (LIPITOR) 40 MG Tab Take 1 Tablet by mouth every evening. 90 Tablet 1    methylPREDNISolone (MEDROL DOSEPAK) 4 MG Tablet Therapy Pack Use as directed 1 Each 0    gemfibrozil (LOPID) 600 MG Tab Take 1 Tablet by mouth 2 times a day. 60 Tablet 1    doxepin (SINEQUAN) 10 MG Cap Take 1 Capsule by mouth every evening. 30 Capsule 1    acetaminophen (TYLENOL) 500 MG Tab Take 1-2 Tablets by mouth every 6 hours as needed for Moderate Pain. 60 Tablet 0    ibuprofen (MOTRIN) 600 MG Tab Take 1 Tablet by mouth every 6 hours as needed for Headache, Inflammation or Moderate Pain. 90 Tablet 1    acetaminophen (TYLENOL) 500 MG Tab Take 1-2 Tablets by mouth every 6 hours as needed for Moderate Pain or Mild Pain. 30 Tablet 2     No current facility-administered medications for this visit.       Social History     Tobacco Use    Smoking status: Every Day     Current packs/day: 0.50     Average packs/day: 0.5 packs/day for 40.0 years (20.0 ttl pk-yrs)     Types: Cigarettes     Start date: 1985    Smokeless tobacco: Never   Vaping Use    Vaping status: Never Used   Substance Use Topics    Alcohol use: Not Currently     Comment: occ    Drug use: Yes     Types: Methamphetamines     Comment: patient quit meth 09/09/22       Patient Active Problem List    Diagnosis Date Noted    Encounter to  "establish care 10/17/2022    Other hyperlipidemia 06/07/2022    Paroxysmal atrial fibrillation (HCC) 06/07/2022    Type 2 diabetes mellitus without complication, without long-term current use of insulin (HCC) 06/07/2022    Chronic idiopathic gout of foot 06/07/2022       Family History   Problem Relation Age of Onset    Cancer Mother         Lung    Alzheimer's Disease Mother     Psychiatric Illness Father         PTSD    Stroke Father     Cancer Father         throat    Lung Disease Sister     Cancer Sister         Lung, bones, brain    Diabetes Sister         5 sister T1DM    Psychiatric Illness Brother         PTSD    Diabetes Brother         5 siblings are T2DM          Objective:     /62 (BP Location: Left arm, Patient Position: Sitting, BP Cuff Size: Adult long)   Pulse (!) 104   Temp 36 °C (96.8 °F) (Temporal)   Resp 16   Ht 1.676 m (5' 6\")   Wt 81.6 kg (180 lb)   SpO2 95%  Body mass index is 29.05 kg/m².    Physical Exam:  Physical Exam  Vitals reviewed.   Constitutional:       General: He is awake.      Appearance: Normal appearance. He is well-developed.   HENT:      Head: Normocephalic.   Eyes:      Conjunctiva/sclera: Conjunctivae normal.   Cardiovascular:      Rate and Rhythm: Normal rate.   Pulmonary:      Effort: Pulmonary effort is normal. No respiratory distress.   Musculoskeletal:      Cervical back: Neck supple.   Skin:     General: Skin is warm and dry.   Neurological:      Mental Status: He is alert and oriented to person, place, and time.   Psychiatric:         Mood and Affect: Mood normal.         Behavior: Behavior normal. Behavior is cooperative.              Assessment and Plan:     The following treatment plan was discussed:    Problem List Items Addressed This Visit       Other hyperlipidemia    Relevant Medications    atorvastatin (LIPITOR) 40 MG Tab    gemfibrozil (LOPID) 600 MG Tab    Type 2 diabetes mellitus without complication, without long-term current use of insulin " (HCC)    Relevant Medications    metFORMIN (GLUCOPHAGE) 500 MG Tab    Other Relevant Orders    HEMOGLOBIN A1C    Chronic idiopathic gout of foot    Relevant Medications    allopurinol (ZYLOPRIM) 300 MG Tab    methylPREDNISolone (MEDROL DOSEPAK) 4 MG Tablet Therapy Pack    acetaminophen (TYLENOL) 500 MG Tab    ibuprofen (MOTRIN) 600 MG Tab     Other Visit Diagnoses       Acute gout of left ankle, unspecified cause        Relevant Medications    allopurinol (ZYLOPRIM) 300 MG Tab    methylPREDNISolone (MEDROL DOSEPAK) 4 MG Tablet Therapy Pack    acetaminophen (TYLENOL) 500 MG Tab    ibuprofen (MOTRIN) 600 MG Tab    Hyperlipidemia, unspecified hyperlipidemia type        Relevant Medications    atorvastatin (LIPITOR) 40 MG Tab    gemfibrozil (LOPID) 600 MG Tab    Other Relevant Orders    Lipid Profile    Need for hepatitis C screening test        Relevant Orders    HEP C VIRUS ANTIBODY    Routine screening for STI (sexually transmitted infection)        Relevant Orders    HIV AG/AB COMBO ASSAY SCREENING    T.PALLIDUM AB JERRICA (SCREENING)    Screening for cardiovascular condition        Relevant Orders    TSH    FREE THYROXINE    VITAMIN D,25 HYDROXY (DEFICIENCY)    Comp Metabolic Panel    Insomnia, unspecified type        Relevant Medications    doxepin (SINEQUAN) 10 MG Cap            Assessment & Plan  1. Gout.  A prescription for a prednisone dose pack will be provided for use during flare-ups. He is advised to discontinue colchicine while on allopurinol 300 mg once daily. Labs to ensure liver and renal function are within normal limits have been ordered and will need to be completed prior to any further refills of medications     2. Insomnia.  A prescription for doxepin 10 mg will be provided for nightly use. Patient states no effect from hydroxyzine and trazodone he was found to have allergic reaction.     3. Diabetes mellitus.  He is advised to continue metformin 500 mg twice daily. A fasting lab test will be ordered  to monitor his condition. He is instructed to fast after midnight and drink plenty of water before the test.    4. Hyperlipidemia.  He is advised to continue gemfibrozil 600 mg and atorvastatin as prescribed. He should report any muscle aches, which could indicate side effects from the medications.    5. Medication management.  Prescriptions for over-the-counter Tylenol and ibuprofen will be provided. All medications will be sent to the pharmacy located next to [facility].    Follow-up  The patient will follow up in 3 months.    Any change or worsening of signs or symptoms, patient encouraged to follow-up or report to emergency room for further evaluation. Patient verbalizes understanding and agrees.      PLEASE NOTE: This dictation was created using voice recognition software. I have made every reasonable attempt to correct obvious errors, but I expect that there are errors of grammar and possibly content that I did not discover before finalizing the note.

## 2025-01-07 ENCOUNTER — HOSPITAL ENCOUNTER (OUTPATIENT)
Dept: LAB | Facility: MEDICAL CENTER | Age: 55
End: 2025-01-07
Attending: NURSE PRACTITIONER
Payer: MEDICAID

## 2025-01-07 DIAGNOSIS — E78.1 HYPERTRIGLYCERIDEMIA: ICD-10-CM

## 2025-01-07 DIAGNOSIS — E78.5 HYPERLIPIDEMIA, UNSPECIFIED HYPERLIPIDEMIA TYPE: ICD-10-CM

## 2025-01-07 DIAGNOSIS — Z11.3 ROUTINE SCREENING FOR STI (SEXUALLY TRANSMITTED INFECTION): ICD-10-CM

## 2025-01-07 DIAGNOSIS — Z13.6 SCREENING FOR CARDIOVASCULAR CONDITION: ICD-10-CM

## 2025-01-07 DIAGNOSIS — E11.9 TYPE 2 DIABETES MELLITUS WITHOUT COMPLICATION, WITHOUT LONG-TERM CURRENT USE OF INSULIN (HCC): ICD-10-CM

## 2025-01-07 DIAGNOSIS — Z11.59 NEED FOR HEPATITIS C SCREENING TEST: ICD-10-CM

## 2025-01-07 LAB
25(OH)D3 SERPL-MCNC: 12 NG/ML (ref 30–100)
CHOLEST SERPL-MCNC: 1007 MG/DL (ref 100–199)
EST. AVERAGE GLUCOSE BLD GHB EST-MCNC: 306 MG/DL
HBA1C MFR BLD: 12.3 % (ref 4–5.6)
HCV AB SER QL: NONREACTIVE
HDLC SERPL-MCNC: ABNORMAL MG/DL
HIV 1+2 AB+HIV1 P24 AG SERPL QL IA: NORMAL
LDLC SERPL CALC-MCNC: ABNORMAL MG/DL
T PALLIDUM AB SER QL IA: NONREACTIVE
T4 FREE SERPL-MCNC: 1.07 NG/DL (ref 0.93–1.7)
TRIGL SERPL-MCNC: >4425 MG/DL (ref 0–149)
TSH SERPL-ACNC: 2.04 UIU/ML (ref 0.35–5.5)

## 2025-01-07 PROCEDURE — 84439 ASSAY OF FREE THYROXINE: CPT

## 2025-01-07 PROCEDURE — 82306 VITAMIN D 25 HYDROXY: CPT

## 2025-01-07 PROCEDURE — 86803 HEPATITIS C AB TEST: CPT

## 2025-01-07 PROCEDURE — 36415 COLL VENOUS BLD VENIPUNCTURE: CPT

## 2025-01-07 PROCEDURE — 83036 HEMOGLOBIN GLYCOSYLATED A1C: CPT

## 2025-01-07 PROCEDURE — 80053 COMPREHEN METABOLIC PANEL: CPT

## 2025-01-07 PROCEDURE — 87389 HIV-1 AG W/HIV-1&-2 AB AG IA: CPT

## 2025-01-07 PROCEDURE — 84443 ASSAY THYROID STIM HORMONE: CPT

## 2025-01-07 PROCEDURE — 80061 LIPID PANEL: CPT

## 2025-01-07 PROCEDURE — 86780 TREPONEMA PALLIDUM: CPT

## 2025-01-08 ENCOUNTER — TELEPHONE (OUTPATIENT)
Dept: VASCULAR LAB | Facility: MEDICAL CENTER | Age: 55
End: 2025-01-08
Payer: MEDICAID

## 2025-01-08 ENCOUNTER — PHARMACY VISIT (OUTPATIENT)
Dept: PHARMACY | Facility: MEDICAL CENTER | Age: 55
End: 2025-01-08
Payer: COMMERCIAL

## 2025-01-08 DIAGNOSIS — E78.5 HYPERLIPIDEMIA, UNSPECIFIED HYPERLIPIDEMIA TYPE: ICD-10-CM

## 2025-01-08 LAB
ALBUMIN SERPL BCP-MCNC: 3.5 G/DL (ref 3.2–4.9)
ALBUMIN/GLOB SERPL: 0.9 G/DL
ALP SERPL-CCNC: 142 U/L (ref 30–99)
ALT SERPL-CCNC: 42 U/L (ref 2–50)
ANION GAP SERPL CALC-SCNC: 11 MMOL/L (ref 7–16)
AST SERPL-CCNC: 35 U/L (ref 12–45)
BILIRUB SERPL-MCNC: 0.3 MG/DL (ref 0.1–1.5)
BUN SERPL-MCNC: 15 MG/DL (ref 8–22)
CALCIUM ALBUM COR SERPL-MCNC: 9.3 MG/DL (ref 8.5–10.5)
CALCIUM SERPL-MCNC: 8.9 MG/DL (ref 8.5–10.5)
CHLORIDE SERPL-SCNC: 88 MMOL/L (ref 96–112)
CO2 SERPL-SCNC: 21 MMOL/L (ref 20–33)
CREAT SERPL-MCNC: 1.06 MG/DL (ref 0.5–1.4)
GFR SERPLBLD CREATININE-BSD FMLA CKD-EPI: 84 ML/MIN/1.73 M 2
GLOBULIN SER CALC-MCNC: 3.8 G/DL (ref 1.9–3.5)
GLUCOSE SERPL-MCNC: 679 MG/DL (ref 65–99)
POTASSIUM SERPL-SCNC: 4.1 MMOL/L (ref 3.6–5.5)
PROT SERPL-MCNC: 7.3 G/DL (ref 6–8.2)
SODIUM SERPL-SCNC: 120 MMOL/L (ref 135–145)

## 2025-01-08 PROCEDURE — RXMED WILLOW AMBULATORY MEDICATION CHARGE: Performed by: FAMILY MEDICINE

## 2025-01-08 RX ORDER — INSULIN LISPRO 100 [IU]/ML
2 INJECTION, SOLUTION INTRAVENOUS; SUBCUTANEOUS
Qty: 3 ML | Refills: 1 | Status: SHIPPED | OUTPATIENT
Start: 2025-01-08 | End: 2025-01-15

## 2025-01-08 RX ORDER — FENOFIBRATE 145 MG/1
145 TABLET, COATED ORAL DAILY
Qty: 90 TABLET | Refills: 1 | Status: SHIPPED | OUTPATIENT
Start: 2025-01-08 | End: 2025-01-09

## 2025-01-08 RX ORDER — GLUCOSAMINE HCL/CHONDROITIN SU 500-400 MG
CAPSULE ORAL
Qty: 300 EACH | Refills: 11 | Status: SHIPPED | OUTPATIENT
Start: 2025-01-08

## 2025-01-08 RX ORDER — LANCETS
EACH MISCELLANEOUS
Qty: 300 EACH | Refills: 11 | Status: SHIPPED | OUTPATIENT
Start: 2025-01-08

## 2025-01-08 NOTE — TELEPHONE ENCOUNTER
----- Message from Physician Michael Bloch, M.D. sent at 1/7/2025  5:16 PM PST -----  Regarding: initial vasc - asap  Initial with DILMA asap - 8 am ok

## 2025-01-08 NOTE — PROGRESS NOTES
Abad Elder,   On call critical lab documentation for blood sugar in 600s.   Called pt regarding blood sugars, advised him to go to the ER he declined, advised on risk of DKA, coma/death. He had no headache, nausea/vomiting/ abdominal pain and felt well except for increased thirst. Advised again to go to the ER for critical elevated BS and pt declines as he feels well.     Glucometer, strips, lancets, low dose long acting and short acting insulins ordered. Advised him to increase metformin to 1000 mg bid.     Triglyceries also sig elevated to >4000, rx for tricor provided instead of gemfibrozil due to contraindication of gemfibrozil with atorvastatin.     Could be the combination of uncontrolled DM2 being off meds and steroid use for gout flare elevated blood sugars to this level.   Encouraged him to eat healthy regular meals, stay hydrated with at least 64 oz water.   Please follow up with the patient with sooner appointment in 1 week to review blood sugars, adjust medications.     Dr. Kyle

## 2025-01-08 NOTE — TELEPHONE ENCOUNTER
Phone Number Called: [phone]    Call outcome: Spoke to patient regarding message below.    Message: was able to get patient scheduled per Dr RIBEIRO (urgent). Gave patient time, location and provider name.    [facility]  [facility]   Phone: [phone]   Fax: [phone]

## 2025-01-09 ENCOUNTER — PHARMACY VISIT (OUTPATIENT)
Dept: PHARMACY | Facility: MEDICAL CENTER | Age: 55
End: 2025-01-09
Payer: COMMERCIAL

## 2025-01-09 ENCOUNTER — OFFICE VISIT (OUTPATIENT)
Dept: VASCULAR LAB | Facility: MEDICAL CENTER | Age: 55
End: 2025-01-09
Attending: FAMILY MEDICINE
Payer: MEDICAID

## 2025-01-09 ENCOUNTER — TELEPHONE (OUTPATIENT)
Dept: VASCULAR LAB | Facility: MEDICAL CENTER | Age: 55
End: 2025-01-09
Payer: MEDICAID

## 2025-01-09 ENCOUNTER — APPOINTMENT (OUTPATIENT)
Dept: LAB | Facility: MEDICAL CENTER | Age: 55
End: 2025-01-09
Payer: MEDICAID

## 2025-01-09 VITALS
WEIGHT: 180 LBS | HEART RATE: 97 BPM | BODY MASS INDEX: 28.93 KG/M2 | HEIGHT: 66 IN | DIASTOLIC BLOOD PRESSURE: 90 MMHG | SYSTOLIC BLOOD PRESSURE: 128 MMHG

## 2025-01-09 DIAGNOSIS — M1A.0790 IDIOPATHIC CHRONIC GOUT OF FOOT WITHOUT TOPHUS, UNSPECIFIED LATERALITY: ICD-10-CM

## 2025-01-09 DIAGNOSIS — R74.8 ELEVATED ALKALINE PHOSPHATASE LEVEL: ICD-10-CM

## 2025-01-09 DIAGNOSIS — E78.3 CHYLOMICRONEMIA SYNDROME: ICD-10-CM

## 2025-01-09 DIAGNOSIS — E78.5 HYPERLIPIDEMIA, UNSPECIFIED HYPERLIPIDEMIA TYPE: ICD-10-CM

## 2025-01-09 DIAGNOSIS — I48.0 PAROXYSMAL ATRIAL FIBRILLATION (HCC): ICD-10-CM

## 2025-01-09 DIAGNOSIS — E78.1 FAMILIAL HYPERTRIGLYCERIDEMIA: ICD-10-CM

## 2025-01-09 DIAGNOSIS — R79.89 PSEUDOHYPONATREMIA: ICD-10-CM

## 2025-01-09 DIAGNOSIS — E11.59 TYPE 2 DIABETES MELLITUS WITH OTHER CIRCULATORY COMPLICATION, WITHOUT LONG-TERM CURRENT USE OF INSULIN (HCC): ICD-10-CM

## 2025-01-09 PROCEDURE — RXMED WILLOW AMBULATORY MEDICATION CHARGE: Performed by: FAMILY MEDICINE

## 2025-01-09 PROCEDURE — 99204 OFFICE O/P NEW MOD 45 MIN: CPT | Performed by: FAMILY MEDICINE

## 2025-01-09 PROCEDURE — 99212 OFFICE O/P EST SF 10 MIN: CPT

## 2025-01-09 PROCEDURE — 3074F SYST BP LT 130 MM HG: CPT | Performed by: FAMILY MEDICINE

## 2025-01-09 PROCEDURE — 3080F DIAST BP >= 90 MM HG: CPT | Performed by: FAMILY MEDICINE

## 2025-01-09 RX ORDER — FENOFIBRIC ACID 135 MG/1
1 CAPSULE, DELAYED RELEASE ORAL DAILY
Qty: 100 CAPSULE | Refills: 3 | Status: SHIPPED | OUTPATIENT
Start: 2025-01-09

## 2025-01-09 RX ORDER — ATORVASTATIN CALCIUM 40 MG/1
40 TABLET, FILM COATED ORAL NIGHTLY
Qty: 100 TABLET | Refills: 3 | Status: SHIPPED | OUTPATIENT
Start: 2025-01-09

## 2025-01-09 ASSESSMENT — ENCOUNTER SYMPTOMS
HEMOPTYSIS: 0
PND: 0
PALPITATIONS: 0
WHEEZING: 0
SPUTUM PRODUCTION: 0
FEVER: 0
CLAUDICATION: 0
COUGH: 0
ORTHOPNEA: 0
SHORTNESS OF BREATH: 0
CHILLS: 0

## 2025-01-09 NOTE — PROGRESS NOTES
INITIAL FAMILY LIPID CLINIC VISIT   01/09/25     Divya Damon, intially referred for evaluation/mgmt of dyslipidemia, est 1/2025  Referral Source: Jean-Pierre, Jael VANEGAS A.P.RN    Subjective    Initial visit history: seen by PCP 1/6/25 and noted to have hyperglycemia, new dx T2D with gluc 300s.  Had received insulin while incarcerated and now on insulin glargine and lispro SSI along with metformin.   Severe HTG >4000 noted.  Prior max in 2022 was 215.   Currently denies Etoh intake.   Known gout on prednisone.   Started atorva and fenofibrate on 1/6/25.   No hx of acute pancreatitis.  Stopped meth 8 months ago.  No prior etoh use.       PERTINENT HLD PMHX  Age at Initial Dx: 51 though cannot recall if had labs earlier in life   Baseline Lipids Prior to Treatment:    Latest Reference Range & Units 06/08/22 15:42 01/07/25 07:54   Cholesterol,Tot 100 - 199 mg/dL 181 1007 (H)   Triglycerides 0 - 149 mg/dL 215 (H) >4425 (H)   HDL >=40 mg/dL 55 see below   LDL <100 mg/dL 83 see below     History of Major ASCVD: None  Other Established Vascular Disease: None  Secondary contributors/causes of dyslipidemia: severe uncontrolled T2D    Previous lipid-lowering meds and outcome:  gemfibrozil - recently stopped     CURRENT MED MGMT  Current Rx:   Statin: atorva 40mg  Non-Statin: fenofibrate 145mg daily   Supplements: None  Current ADRs/side effects? no  Adherence? Only just rx'd on 1/6/25.     LIFESTYLE MGMT  Barriers to care/SDOH: medicaid  Tobacco:  reports that he has been smoking cigarettes. He started smoking about 40 years ago. He has a 20 pack-year smoking history. He has never used smokeless tobacco.   Change in weight: Stable  Exercise habits: minimal exercise  Dietary patterns: common adult  Etoh: see sochx    OTHER CV RISK FACTORS:    Hx of PAF (2022) - reports resolved.  No current cardiology    HTN: no prior dx or meds, not checking home BP   Dysglycemia: yes, T2D with currently a1c 12.3 and gluc  679  Antithrombotic tx: none  - no hx of bleeding        Patient Active Problem List   Diagnosis    Other hyperlipidemia    Paroxysmal atrial fibrillation (HCC)    Type 2 diabetes mellitus without complication, without long-term current use of insulin (HCC)    Chronic idiopathic gout of foot    Encounter to establish care    Chylomicronemia syndrome    Familial hypertriglyceridemia    Pseudohyponatremia      has a past surgical history that includes lymph node excision.  Current Outpatient Medications on File Prior to Visit   Medication Sig Dispense Refill    glucose blood strip Test blood sugar as recommended by provider.  blood glucose monitoring kit. 300 Strip 11    Blood Glucose Monitoring Suppl (TRUE METRIX METER) w/Device Kit Use one strip to test blood sugar three times daily. 1 Kit 11    TechLite AST Lancets Misc Use one lancet to test blood sugar three times daily. 300 Each 11    Alcohol Swabs Wipe site with prep pad prior to injection. 300 Each 11    insulin glargine 100 UNIT/ML SC SOPN injection Inject 10 Units under the skin every evening. Increase or decrease by 2 units every 3 days till fasting glucose is  3 mL 3    insulin lispro 100 UNIT/ML SC SOPN injection PEN Inject 2 Units under the skin 3 times a day before meals. And Follow this scale: blood sugar 160-200 use 2 units, 200-250 use 4 units, 250-300 use 6 units, 300-350 take 8 units, go to the ER if blood sugar remains in 300-400 range. Use this with food and stay well hydrated with water 64 oz daily. 3 mL 1    allopurinol (ZYLOPRIM) 300 MG Tab Take 1 Tablet by mouth 2 times a day. 30 Tablet 4    metFORMIN (GLUCOPHAGE) 500 MG Tab Take 1 Tablet by mouth 2 times a day with meals. 60 Tablet 5    methylPREDNISolone (MEDROL DOSEPAK) 4 MG Tablet Therapy Pack Use as directed 21 Each 0    doxepin (SINEQUAN) 10 MG Cap Take 1 Capsule by mouth every evening. 30 Capsule 1    acetaminophen (TYLENOL) 500 MG Tab Take 1-2 Tablets by mouth every 6 hours as  "needed for Moderate Pain. 60 Tablet 0    ibuprofen (MOTRIN) 600 MG Tab Take 1 Tablet by mouth every 6 hours as needed for Headache, Inflammation or Moderate Pain. 90 Tablet 1    acetaminophen (TYLENOL) 500 MG Tab Take 1-2 Tablets by mouth every 6 hours as needed for Moderate Pain or Mild Pain. 30 Tablet 2     No current facility-administered medications on file prior to visit.      Allergies   Allergen Reactions    Pcn [Penicillins]     Trazodone       Family History   Problem Relation Age of Onset    Cancer Mother         Lung    Alzheimer's Disease Mother     Psychiatric Illness Father         PTSD    Stroke Father     Cancer Father         throat    Lung Disease Sister     Cancer Sister         Lung, bones, brain    Diabetes Sister         5 sister T1DM    Psychiatric Illness Brother         PTSD    Diabetes Brother         5 siblings are T2DM    Familial Hypercholesterolemia Neg Hx      Social History     Tobacco Use    Smoking status: Every Day     Current packs/day: 0.50     Average packs/day: 0.5 packs/day for 40.0 years (20.0 ttl pk-yrs)     Types: Cigarettes     Start date: 1985    Smokeless tobacco: Never   Vaping Use    Vaping status: Never Used   Substance Use Topics    Alcohol use: Not Currently     Comment: occ    Drug use: Yes     Types: Methamphetamines     Comment: patient quit meth 09/09/22     Review of Systems   Constitutional:  Negative for chills, fever and malaise/fatigue.   Respiratory:  Negative for cough, hemoptysis, sputum production, shortness of breath and wheezing.    Cardiovascular:  Negative for chest pain, palpitations, orthopnea, claudication, leg swelling and PND.         Objective    Vitals:    01/09/25 0757 01/09/25 0800   BP: (!) 164/90 (!) 128/90   BP Location: Left arm Left arm   Patient Position: Sitting Sitting   BP Cuff Size: Large adult Large adult   Pulse: (!) 106 97   Weight: 81.6 kg (180 lb)    Height: 1.676 m (5' 6\")      BP Readings from Last 5 Encounters:   01/09/25 " (!) 128/90   01/06/25 116/62   03/28/23 110/82   12/13/22 128/72   12/06/22 (!) 151/89     BMI Readings from Last 1 Encounters:   01/09/25 29.05 kg/m²     Wt Readings from Last 3 Encounters:   01/09/25 81.6 kg (180 lb)   01/06/25 81.6 kg (180 lb)   03/28/23 86.4 kg (190 lb 8 oz)     Physical Exam  Constitutional:       General: He is not in acute distress.     Appearance: Normal appearance. He is not ill-appearing.   HENT:      Head: Normocephalic and atraumatic.   Eyes:      Conjunctiva/sclera: Conjunctivae normal.      Comments: No corneal arcus   Neck:      Vascular: No carotid bruit.   Cardiovascular:      Rate and Rhythm: Normal rate and regular rhythm.      Pulses:           Carotid pulses are 2+ on the right side and 2+ on the left side.       Radial pulses are 2+ on the right side and 2+ on the left side.        Popliteal pulses are 2+ on the right side and 2+ on the left side.        Dorsalis pedis pulses are 2+ on the right side and 2+ on the left side.      Heart sounds: No murmur heard.  Pulmonary:      Effort: Pulmonary effort is normal.      Breath sounds: Normal breath sounds.   Musculoskeletal:      Cervical back: Normal range of motion.      Right lower leg: No edema.      Left lower leg: No edema.   Feet:      Comments: No achilles thickening or nodularity  Skin:     General: Skin is warm and dry.      Coloration: Skin is not cyanotic or mottled.      Findings: No wound.      Comments: No tendon xanthomas,eruptive xanthomas, or palmar crease xanthomas    Neurological:      General: No focal deficit present.      Mental Status: He is alert.   Psychiatric:         Mood and Affect: Mood normal.       DATA REVIEW:  Most Recent Lipid Panel:   Lab Results   Component Value Date/Time    CHOLSTRLTOT 1007 (H) 01/07/2025 07:54 AM    LDL see below 01/07/2025 07:54 AM    HDL see below 01/07/2025 07:54 AM    TRIGLYCERIDE >4425 (H) 01/07/2025 07:54 AM     Lab Results   Component Value Date/Time    LDL see below  "01/07/2025 07:54 AM    LDL 83 06/08/2022 03:42 PM      No results found for: \"LIPOPROTA\"   No results found for: \"APOB\"   No results found for: \"CRPHIGHSEN\"    Other Pertinent Blood Work:   Lab Results   Component Value Date    SODIUM 120 (L) 01/07/2025    POTASSIUM 4.1 01/07/2025    CHLORIDE 88 (L) 01/07/2025    CO2 21 01/07/2025    ANION 11.0 01/07/2025    GLUCOSE 679 (HH) 01/07/2025    BUN 15 01/07/2025    CREATININE 1.06 01/07/2025    CALCIUM 8.9 01/07/2025    ASTSGOT 35 01/07/2025    ALTSGPT 42 01/07/2025    ALKPHOSPHAT 142 (H) 01/07/2025    TBILIRUBIN 0.3 01/07/2025    ALBUMIN 3.5 01/07/2025    AGRATIO 0.9 01/07/2025    TSHULTRASEN 2.040 01/07/2025       IMAGING: none       PROCEDURES: none            ASSESSMENT AND PLAN  1. Chylomicronemia syndrome  Choline Fenofibrate (FENOFIBRIC ACID) 135 MG CAPSULE DELAYED RELEASE    APOLIPOPROTEIN B    Comp Metabolic Panel    Lipoprotein (a)    Lipid Profile    APO E GENOTYPING,CARDIO RISK    LDL, DIRECT    Referral to Nutrition Services      2. Familial hypertriglyceridemia  Choline Fenofibrate (FENOFIBRIC ACID) 135 MG CAPSULE DELAYED RELEASE    APOLIPOPROTEIN B    Comp Metabolic Panel    CRP HIGH SENSITIVE (CARDIAC)    Lipoprotein (a)    Lipid Profile    APO E GENOTYPING,CARDIO RISK    LDL, DIRECT    Referral to Nutrition Services      3. Type 2 diabetes mellitus with other circulatory complication, without long-term current use of insulin (HCC)  CRP HIGH SENSITIVE (CARDIAC)    Basic Metabolic Panel    TERRIE-65    C-PEPTIDE    Referral to Pharmacotherapy Service    Insulin Pen Needle      4. Idiopathic chronic gout of foot without tophus, unspecified laterality        5. Paroxysmal atrial fibrillation (HCC)        6. Pseudohyponatremia        7. Elevated alkaline phosphatase level  MITOCHONDRIAL (M2) AB      8. Hyperlipidemia, unspecified hyperlipidemia type  atorvastatin (LIPITOR) 40 MG Tab        PATIENT TYPE: Primary Prevention    MAJOR ASCVD: None      OTHER " "ESTABLISHED CVD:     1) hx of PAF - reports currently stable     EVIDENCE OF GENETIC DYSLIPIDEMIA: Yes   - severe high TC >1000 and TG >4000 indicative of type III FDBL as generally this is \"inducible\" severe TG - genetic + secondary factors   - ddx includes type IIb (FCHL), type V (multifactorial chylomicronemia syndrome) and less likely type I familial chylomicronemia syndrome (FCS) due to prior TG <500 consistently.    - unlikely type IIa (FH) due to severity of HTG     FH / LIPIDEMIA genotyping: YES, ordered via [facility]  Your order ID is [id number].  -  as has high risk for genetically mediated lipid condition and will help aid in definitive dx and tx including enrollment in apoC3i studies     Benefits/limitations/risks for dyslipidemic genetic testing reviewed.  Further resources for review available at [email or web address] site    Indications and rationale for dyslipidemia genetic testing:  -Strong clinical suspicion of a genetic dyslipidemia.  - assist with definitive diagnosis and aid in treatment decision-making  -Strong family history of dyslipidemia or its complications.  -Presence of related syndromic features  -Evidence that testing might .  -Available and effective early interventions exist.  -Eligibility for new or investigational drugs.  -Patient preference.  -Family planning  - informing need for cascade screening of family members     Benefits include:   -Confirmation of a clinical diagnosis especially in cases where it is not clear whether the person genetic dyslipidemia   -Provides more information about one’s risk or diagnosis, since not all individuals present the same.  -Often results in initiation and intensification of therapy by a healthcare provider.   -Provides information regarding why a healthy lifestyle and diet have not been able to control cholesterol levels on their own.  -Helps other family members to be screened.  -Determines whether or not his condition has been passed " down to a child, since everyone with may have a 50% chance of doing so.    ApoE genotyping (for familial dysbetalipoproteinemia eval): YES, high prob     SECONDARY CAUSES / CONTRIBUTORS: yes  Metabolic:  T2D - severely uncontrolled is significant contributor to IR leading to increase apoC3 expression - a strong inhibitor of LPL  Thyroid disease: excluded - normal TSH  Liver disease: high ALP could signal baseline biliary dz including PBC - will check AMAs   Renal disease/nephrotic syndrome:  excluded   Dietary-induced (ketogenic, lean mass hyper-responder)? no  Medications: Corticosteroids  - acute tx for gout likely increasing expression of TGs   Alcohol use: none currently or in the past     ACC/AHA INDICATION FOR STATIN THERAPY:  Primary Severe HLD / FH (baseline LDL-C >190)  - severe HTG also important for statin therapy but ineffective if TG >1000 until LPL stabilizes     ASCVD RISK CALCULATIONS:  PCE: The ASCVD Risk score (Elizabeth PERLA, et al., 2019) failed to calculate., N/A    OTHER SIGNIFICANT RISK MARKERS:  High-risk conditions: N/A  Risk-enhancers: Persistently elevated LDL-C >159, Metabolic syndrome , and Persistently elevated trigs >174  Lipoprotein(a): Ordered this visit    - additional advanced biomarkers ordered     TARGETS / GOALS (per most recent ACC/AHA guidelines):  At goal as of 1/2025?   Primary (panc risk reduction):  TG <500 - NOT MET  Secondary (ascvd risk reduction): apoB <90-  NOT MET       Non-HDL-C <130 - NOT MET       Direct LDL-C <100 - NOT MET  Tertiary:    TG <150 - NOT MET     LIFESTYLE INTERVENTIONS  TOBACCO:  reports that he has been smoking cigarettes. He started smoking about 40 years ago. He has a 20 pack-year smoking history. He has never used smokeless tobacco.   - continued complete avoidance of all tobacco products   PHYSICAL ACTIVITY: at least 150 min per week of moderate intensity  NUTRITION: Chylomicron-clearing diet plan (low total fat calories with reduce CHO content)-  provided specific handout due to severe hypertriglyceridemia , Ref to eval with RD to help with tx goals and meal planning , Weight reduction - reduce caloric intake by 300 - 500 kcal/day to achieve 1-2lb weight loss per week , and - handout provided   ETOH: complete abstinence recommended  WT MGMT:  focus on 5-7% reduction as initial goal     LIPID-LOWERING MEDICATION MANAGEMENT:     Statin Therapy:   Continue atorva 40mg daily - though minimal to no benefit for TG lowering until <1000     Non-Statin Therapies:     LDL/apoB therapies:  ZETIA: add as additional agent for 10-15% TG lowering   NEXLETOL: not currently indicated   BAS: contraindicated   PCSK9 mAb: not indicated   LEQVIO: not indicated     TG therapies:  OMEGA-3 FAs: add lovaza as next agent if TG remain >500, would avoid vascepa due to prior hx of AF   FIBRATE:  stop fenofibrate 145mg and start fenofibric acid 135mg - better bioavail and FDA-approved for use with high potency statin   ApoC3i: possible candidate for research study enrollment     LP(a) modifying therapy: available in clinical research trials only at this time     PCSK9i strategy indications: Not currently indicated    RECOMMENDED SUPPLEMENTS: None     APHERESIS: n/a     BLOOD PRESSURE CONTROL   Office BP goal per ACC/AHA <130/80  Home BP at goal:  yes  Office BP at goal:  no  24h ABPM:  not ordered to date   RDN candidate? NO  Contributing factors: n/a    Plan:   - start/continue home BP monitoring, reviewed correct technique, provide BP log and instructions  - order 24h ABPM:  NO  - routine monitoring of lytes/gfr   Medications:  - likely needs RAASi, mindi if high UACR     GLYCEMIC STATUS/MGMT: Diabetic, T2D, severely uncontrolled, consider SHARRI  Goal A1c < 7.0  Lab Results   Component Value Date    HBA1C 12.3 (H) 01/07/2025      Lab Results   Component Value Date/Time    MALBCRT 99 (H) 10/14/2022 12:00 PM    MICROALBUR 14.7 10/14/2022 12:00 PM     Plan:  - no indications of severe  catabolic state, so not indicated for admission   - will get urgent eval with pharm DM clinic for prompt insulin titration to get out of glucose toxicity and lower risks for hyperosm coma and catabolic state  - update TERRIE-65 ab, C-peptide to eval for SHARRI   - continue current medication plan   - recommmend for routine care with PCP (or endocrine) to include regular A1c monitoring, annual albumin/creatinine ratio (ACR), annual diabetic retinopathy screening, foot exams, annual flu vaccine, and updates to pneumonia vaccines as appropriate      ANTITHROMBOTIC THERAPY: Not currently recommended    OTHER    # hx of meth abuse, in remission - continued abstinence     STUDIES:  none   LABS: as noted above  FOLLOW-UP: 4 weeks     Alexis Abbott M.D.  ABCL, board-certified Clinical Lipidologist   [facility]   [facility]   [phone]      CC:  DELMIS Gutierrez, NATALIO Brandon*

## 2025-01-09 NOTE — TELEPHONE ENCOUNTER
needs pharm DM clinic to establish care asap  Received: Today  Alexis Abbott M.D.  P Chris Murphy; Netta Hall, AleD  Currently severe hyperglycemia, 400s, a1c 12 with associated severe hypertrig that I'm dealing with. Started on glargine 10 units per PCP, not started yet.  Needs to establish care with pharm DM clinic ASAP please.  I don't know how to schedule onto your schedule.  Please contact him to schedule.  Ref placed.   Thanks for the help    ____________________________________________________________________________________________________________________________________    Called pt - scheduled for soonest available NP appt.     Austin Harris, Fredi, BCACP

## 2025-01-10 ENCOUNTER — HOSPITAL ENCOUNTER (OUTPATIENT)
Dept: LAB | Facility: MEDICAL CENTER | Age: 55
End: 2025-01-10
Attending: FAMILY MEDICINE
Payer: MEDICAID

## 2025-01-10 DIAGNOSIS — E78.1 FAMILIAL HYPERTRIGLYCERIDEMIA: ICD-10-CM

## 2025-01-10 DIAGNOSIS — E11.59 TYPE 2 DIABETES MELLITUS WITH OTHER CIRCULATORY COMPLICATION, WITHOUT LONG-TERM CURRENT USE OF INSULIN (HCC): ICD-10-CM

## 2025-01-10 DIAGNOSIS — R74.8 ELEVATED ALKALINE PHOSPHATASE LEVEL: ICD-10-CM

## 2025-01-10 DIAGNOSIS — E78.3 CHYLOMICRONEMIA SYNDROME: ICD-10-CM

## 2025-01-10 LAB
ALBUMIN SERPL BCP-MCNC: 3.8 G/DL (ref 3.2–4.9)
ALBUMIN/GLOB SERPL: 1.2 G/DL
ALP SERPL-CCNC: 129 U/L (ref 30–99)
ALT SERPL-CCNC: 30 U/L (ref 2–50)
ANION GAP SERPL CALC-SCNC: 14 MMOL/L (ref 7–16)
AST SERPL-CCNC: 20 U/L (ref 12–45)
BILIRUB SERPL-MCNC: 0.3 MG/DL (ref 0.1–1.5)
BUN SERPL-MCNC: 13 MG/DL (ref 8–22)
CALCIUM ALBUM COR SERPL-MCNC: 8.9 MG/DL (ref 8.5–10.5)
CALCIUM SERPL-MCNC: 8.7 MG/DL (ref 8.5–10.5)
CHLORIDE SERPL-SCNC: 91 MMOL/L (ref 96–112)
CHOLEST SERPL-MCNC: 785 MG/DL (ref 100–199)
CO2 SERPL-SCNC: 19 MMOL/L (ref 20–33)
CREAT SERPL-MCNC: 0.42 MG/DL (ref 0.5–1.4)
CRP SERPL HS-MCNC: 6.7 MG/L (ref 0–3)
GFR SERPLBLD CREATININE-BSD FMLA CKD-EPI: 128 ML/MIN/1.73 M 2
GLOBULIN SER CALC-MCNC: 3.2 G/DL (ref 1.9–3.5)
GLUCOSE SERPL-MCNC: 312 MG/DL (ref 65–99)
HDLC SERPL-MCNC: ABNORMAL MG/DL
LDLC SERPL CALC-MCNC: ABNORMAL MG/DL
POTASSIUM SERPL-SCNC: 3.7 MMOL/L (ref 3.6–5.5)
PROT SERPL-MCNC: 7 G/DL (ref 6–8.2)
SODIUM SERPL-SCNC: 124 MMOL/L (ref 135–145)
TRIGL SERPL-MCNC: 2841 MG/DL (ref 0–149)

## 2025-01-10 PROCEDURE — 83695 ASSAY OF LIPOPROTEIN(A): CPT

## 2025-01-10 PROCEDURE — 86141 C-REACTIVE PROTEIN HS: CPT

## 2025-01-10 PROCEDURE — 81401 MOPATH PROCEDURE LEVEL 2: CPT

## 2025-01-10 PROCEDURE — 80053 COMPREHEN METABOLIC PANEL: CPT

## 2025-01-10 PROCEDURE — 36415 COLL VENOUS BLD VENIPUNCTURE: CPT

## 2025-01-10 PROCEDURE — 80061 LIPID PANEL: CPT

## 2025-01-10 PROCEDURE — 83721 ASSAY OF BLOOD LIPOPROTEIN: CPT

## 2025-01-10 PROCEDURE — 82172 ASSAY OF APOLIPOPROTEIN: CPT

## 2025-01-10 PROCEDURE — 86381 MITOCHONDRIAL ANTIBODY EACH: CPT

## 2025-01-10 PROCEDURE — 84681 ASSAY OF C-PEPTIDE: CPT

## 2025-01-10 PROCEDURE — 86341 ISLET CELL ANTIBODY: CPT

## 2025-01-12 LAB
APO B100 SERPL-MCNC: 142 MG/DL (ref 66–133)
C PEPTIDE SERPL-MCNC: 2.4 NG/ML (ref 0.5–3.3)
LDLC SERPL-MCNC: 78 MG/DL (ref 0–129)
LPA SERPL-MCNC: 19 MG/DL

## 2025-01-13 LAB
GAD65 AB SER IA-ACNC: <5 IU/ML (ref 0–5)
MITOCHONDRIA M2 IGG SER-ACNC: 38.6 UNITS (ref 0–24.9)

## 2025-01-15 ENCOUNTER — TELEPHONE (OUTPATIENT)
Dept: MEDICAL GROUP | Facility: MEDICAL CENTER | Age: 55
End: 2025-01-15
Payer: MEDICAID

## 2025-01-15 ENCOUNTER — NON-PROVIDER VISIT (OUTPATIENT)
Dept: MEDICAL GROUP | Facility: MEDICAL CENTER | Age: 55
End: 2025-01-15
Attending: NURSE PRACTITIONER
Payer: MEDICAID

## 2025-01-15 ENCOUNTER — PHARMACY VISIT (OUTPATIENT)
Dept: PHARMACY | Facility: MEDICAL CENTER | Age: 55
End: 2025-01-15
Payer: COMMERCIAL

## 2025-01-15 VITALS
BODY MASS INDEX: 29.38 KG/M2 | HEART RATE: 89 BPM | HEIGHT: 66 IN | DIASTOLIC BLOOD PRESSURE: 87 MMHG | WEIGHT: 182.8 LBS | SYSTOLIC BLOOD PRESSURE: 113 MMHG

## 2025-01-15 DIAGNOSIS — E11.9 TYPE 2 DIABETES MELLITUS WITHOUT COMPLICATION, WITHOUT LONG-TERM CURRENT USE OF INSULIN (HCC): ICD-10-CM

## 2025-01-15 PROCEDURE — RXMED WILLOW AMBULATORY MEDICATION CHARGE: Performed by: NURSE PRACTITIONER

## 2025-01-15 PROCEDURE — 99213 OFFICE O/P EST LOW 20 MIN: CPT | Performed by: PHARMACIST

## 2025-01-15 RX ORDER — DAPAGLIFLOZIN AND METFORMIN HYDROCHLORIDE 5; 1000 MG/1; MG/1
TABLET, FILM COATED, EXTENDED RELEASE ORAL
Qty: 180 TABLET | Refills: 5 | Status: SHIPPED | OUTPATIENT
Start: 2025-01-15

## 2025-01-15 RX ORDER — LISINOPRIL 2.5 MG/1
2.5 TABLET ORAL DAILY
Qty: 30 TABLET | Refills: 5 | Status: SHIPPED | OUTPATIENT
Start: 2025-01-15 | End: 2025-01-15

## 2025-01-15 RX ORDER — ACYCLOVIR 400 MG/1
TABLET ORAL
Qty: 3 EACH | Refills: 5 | Status: SHIPPED | OUTPATIENT
Start: 2025-01-15

## 2025-01-15 RX ORDER — ACYCLOVIR 400 MG/1
TABLET ORAL
Qty: 1 EACH | Refills: 1 | Status: SHIPPED | OUTPATIENT
Start: 2025-01-15 | End: 2025-01-29

## 2025-01-15 RX ORDER — INSULIN LISPRO 100 [IU]/ML
INJECTION, SOLUTION INTRAVENOUS; SUBCUTANEOUS
Qty: 9 ML | Refills: 5 | Status: SHIPPED | OUTPATIENT
Start: 2025-01-15

## 2025-01-15 RX ORDER — LOSARTAN POTASSIUM 25 MG/1
25 TABLET ORAL DAILY
Qty: 30 TABLET | Refills: 5 | Status: SHIPPED | OUTPATIENT
Start: 2025-01-15

## 2025-01-15 RX ORDER — SEMAGLUTIDE 0.68 MG/ML
0.25 INJECTION, SOLUTION SUBCUTANEOUS
Qty: 3 ML | Refills: 11 | Status: SHIPPED | OUTPATIENT
Start: 2025-01-15

## 2025-01-15 NOTE — PROGRESS NOTES
New Patient Consult Note  Primary care physician: DELMIS Gutierrez  10:00-11:00   Reason for consult: Management of Uncontrolled Type 2 Diabetes    HPI:  Divya Damon is a 54 y.o. old patient who comes in today for evaluation of above stated problem.      Pt was diagnosed with diabetes a few years and was on metformin only  but controlled it. He was recently incarcerated (May 8-November 7)and upon returning home his diet became very bad. He was drinking plenty of soda, eating a a lot of candy, and ate too much rice (he is [place]). He comes today eager to lower his A1c as he was surprised his BG got this high and A1c this uncontrolled. He felt fine even when his BG was up in the 400s.      Both sides of his family have diabetes. He has high cholesterol (TG) and gout. He started to notice cracks in his feet and is concerned because of his diabetes and knows it could become infected.       Pt wants to loose weight. He does smoke 10-12 cigarettes a da and has been smoking since he was 15 but acknowledged he is having difficulty quitting as he is addicted. He was unsuccessful in the past.       Basic physiology of DMII was explained to patient as well as microvascular/macrovascular complications. The importance of increasing physical activity to improve diabetes control was discussed with the patient. Patient was also educated on changing his/her diet and making better choices to help control blood sugar. Discussed the plate method as well. He has reduced his soda intake since his last A1c result, and reduced rice to about half a cup per meal. He also eats more vegetables now.     Pt did not have clear directions with his insulin. He usually does ten units with lantus, but has done 20 and even 30 when his BG is higher!!!! He does short acting insulin as well but only before meals and per sliding scale, not accounting for the carbs in the meals.     Pt scheduled retinal exam with PCP after the appt  today.     Most Recent HbA1c:   Lab Results   Component Value Date/Time    HBA1C 12.3 (H) 01/07/2025 07:54 AM    HBA1C 7.0 (A) 03/28/2023 09:22 AM    HBA1C 6.4 (A) 10/14/2022 04:50 PM               Current Diabetes Regimen:  GLP-1 Agent: never tried   SGLT-2 Inhibitor: N/a   Metformin  - 500mg BID (has tolerated up to 2000mg a day)   Basal Insulin: Lantus 10 units qhs  (but occassional has been doing up to 20, and even once did 30 )   Prandial Insulin: Insulin lispro - before each meal 2-8 TID (meals) using sliding scale ONLY        SMBG   Hypoglycemia:   No   Patient was educated on how to identify and treat hypoglycemia if indicated (BG <70).        ROS:  Constitutional: No weight loss  Cardiac: No palpitations or racing heart  Resp: No shortness of breath  Neuro: No numbness or tinging in feet  Endo: No heat or cold intolerance, no polyuria or polydipsia  All other systems were reviewed and were negative.    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    Familial hypertriglyceridemia 01/09/2025    Pseudohyponatremia 01/09/2025    Encounter to establish care 10/17/2022    Other hyperlipidemia 06/07/2022    Paroxysmal atrial fibrillation (HCC) 06/07/2022    Type 2 diabetes mellitus without complication, without long-term current use of insulin (HCC) 06/07/2022    Chronic idiopathic gout of foot 06/07/2022       Past Surgical History:  Past Surgical History:   Procedure Laterality Date    LYMPH NODE EXCISION         Allergies:  Pcn [penicillins] and Trazodone    Social History:  Social History     Socioeconomic History    Marital status: Single     Spouse name: Not on file    Number of children: Not on file    Years of education: Not on file    Highest education level: Not on file   Occupational History    Not on file   Tobacco Use    Smoking status: Every Day     Current packs/day: 0.50     Average packs/day: 0.5 packs/day for 40.0 years (20.0  ttl pk-yrs)     Types: Cigarettes     Start date: 1985    Smokeless tobacco: Never   Vaping Use    Vaping status: Never Used   Substance and Sexual Activity    Alcohol use: Not Currently     Comment: occ    Drug use: Yes     Types: Methamphetamines     Comment: patient quit meth 09/09/22    Sexual activity: Yes     Partners: Male     Birth control/protection: Condom   Other Topics Concern    Not on file   Social History Narrative    Not on file     Social Drivers of Health     Financial Resource Strain: Not on file   Food Insecurity: Not on file   Transportation Needs: Not on file   Physical Activity: Not on file   Stress: Not on file   Social Connections: Not on file   Intimate Partner Violence: Not on file   Housing Stability: Not on file       Family History:  Family History   Problem Relation Age of Onset    Cancer Mother         Lung    Alzheimer's Disease Mother     Psychiatric Illness Father         PTSD    Stroke Father     Cancer Father         throat    Lung Disease Sister     Cancer Sister         Lung, bones, brain    Diabetes Sister         5 sister T1DM    Psychiatric Illness Brother         PTSD    Diabetes Brother         5 siblings are T2DM    Familial Hypercholesterolemia Neg Hx        Medications:    Current Outpatient Medications:     Dapagliflozin Pro-metFORMIN ER 5-1000 MG TABLET SR 24 HR, Take 1 tablet by mouth 2 times a day, Disp: 180 Tablet, Rfl: 5    Continuous Glucose Sensor (DEXCOM G7 SENSOR) Misc, Use changing every 10 days, Disp: 3 Each, Rfl: 5    Continuous Glucose  (DEXCOM G7 ) Device, Use continuously, Disp: 1 Each, Rfl: 1    insulin glargine 100 UNIT/ML SC SOPN injection, Inject 10 Units under the skin every evening. Will increase up to 20 as directed., Disp: 6 mL, Rfl: 5    insulin lispro 100 UNIT/ML SC SOPN injection PEN, Use 2- 4 units before meals. Add per SS. 150-200- 1 unit, 200-250 - 2 units , 250-300 - 3 units, 300-350 - 4 units, 350-400 - 5 units, 400+ 6  units . Max 30 units a day, Disp: 9 mL, Rfl: 5    Semaglutide,0.25 or 0.5MG/DOS, (OZEMPIC, 0.25 OR 0.5 MG/DOSE,) 2 MG/3ML Solution Pen-injector, Inject 0.25 mg under the skin every 7 days. Increase to 0.5mg weekly when tolerated., Disp: 3 mL, Rfl: 11    losartan (COZAAR) 25 MG Tab, Take 1 Tablet by mouth every day., Disp: 30 Tablet, Rfl: 5    Choline Fenofibrate (FENOFIBRIC ACID) 135 MG CAPSULE DELAYED RELEASE, Take 1 Capsule by mouth every day. To lower triglycerides, Disp: 100 Capsule, Rfl: 3    Insulin Pen Needle, Use with insulin pen as directed, Disp: 100 Each, Rfl: 5    atorvastatin (LIPITOR) 40 MG Tab, Take 1 Tablet by mouth every evening., Disp: 100 Tablet, Rfl: 3    glucose blood strip, Test blood sugar as recommended by provider.  blood glucose monitoring kit., Disp: 300 Strip, Rfl: 11    Blood Glucose Monitoring Suppl (TRUE METRIX METER) w/Device Kit, Use one strip to test blood sugar three times daily., Disp: 1 Kit, Rfl: 11    TechLite AST Lancets Misc, Use one lancet to test blood sugar three times daily., Disp: 300 Each, Rfl: 11    Alcohol Swabs, Wipe site with prep pad prior to injection., Disp: 300 Each, Rfl: 11    allopurinol (ZYLOPRIM) 300 MG Tab, Take 1 Tablet by mouth 2 times a day., Disp: 30 Tablet, Rfl: 4    methylPREDNISolone (MEDROL DOSEPAK) 4 MG Tablet Therapy Pack, Use as directed, Disp: 21 Each, Rfl: 0    doxepin (SINEQUAN) 10 MG Cap, Take 1 Capsule by mouth every evening., Disp: 30 Capsule, Rfl: 1    acetaminophen (TYLENOL) 500 MG Tab, Take 1-2 Tablets by mouth every 6 hours as needed for Moderate Pain., Disp: 60 Tablet, Rfl: 0    ibuprofen (MOTRIN) 600 MG Tab, Take 1 Tablet by mouth every 6 hours as needed for Headache, Inflammation or Moderate Pain., Disp: 90 Tablet, Rfl: 1    acetaminophen (TYLENOL) 500 MG Tab, Take 1-2 Tablets by mouth every 6 hours as needed for Moderate Pain or Mild Pain., Disp: 30 Tablet, Rfl: 2    Labs: Reviewed    Physical Examination:  Vital signs: /87    "Pulse 89   Ht 1.676 m (5' 6\")   Wt 82.9 kg (182 lb 12.8 oz)   BMI 29.50 kg/m²  Body mass index is 29.5 kg/m².  General: No apparent distress, cooperative  Eyes: No scleral icterus or discharge  ENMT: Normal on external inspection of nose, lips, normal thyroid exam  Neck: No abnormal masses on inspection  Resp: Normal effort, clear to auscultation bilaterally   CVS: Regular rate and rhythm, S1 S2 normal, no murmur   Extremities: No edema  Abdomen: abdominal obesity present  Neuro: Alert and oriented  Skin: No rash  Psych: Normal mood and affect, intact memory and able to make informed decisions    Plan:    There are no diagnoses linked to this encounter.    Return in about 2 weeks (around 1/29/2025).    DM - Patients A1c is high and SMBG shows it is stll high. He is not optimized at all on non insulin therapy and he is not using his insulin properly. Pt counseled that short acting should be used to correct BG and he should not be fluctuating his long acting dose like he was.   - Lantus 10 units qhs   - Humalog 2-4 units with meals   New SS provided as well   - Metformin/farxiga (per insurance preference) 5/1000mg XR BID   - Ozempic 0.25mg weekly  - Increase consumption of green leafy vegetables and protien    Dyslipidemia   Pt ldl at goal but TG is very high! He follows vascular/cardio and is getting genetic testing soon.     HTN / Diabetic CKD   - Losartan 25mg qday for microalbuminuria with diabetes (and potential help with gout)      Thank you for allowing me to participate in the care of this patient.    Jesus Khanna, PharmD    01/15/25    CC:   Jael Morejon, A.P.RAlavNAlva      "

## 2025-01-17 ENCOUNTER — PHARMACY VISIT (OUTPATIENT)
Dept: PHARMACY | Facility: MEDICAL CENTER | Age: 55
End: 2025-01-17

## 2025-01-17 ENCOUNTER — HOSPITAL ENCOUNTER (OUTPATIENT)
Dept: LAB | Facility: MEDICAL CENTER | Age: 55
End: 2025-01-17
Attending: NURSE PRACTITIONER
Payer: MEDICAID

## 2025-01-17 DIAGNOSIS — E11.9 TYPE 2 DIABETES MELLITUS WITHOUT COMPLICATION, WITHOUT LONG-TERM CURRENT USE OF INSULIN (HCC): ICD-10-CM

## 2025-01-17 LAB
CREAT SERPL-MCNC: 0.84 MG/DL (ref 0.5–1.4)
CREAT UR-MCNC: 70.62 MG/DL
GFR SERPLBLD CREATININE-BSD FMLA CKD-EPI: 104 ML/MIN/1.73 M 2
MICROALBUMIN UR-MCNC: 8.7 MG/DL
MICROALBUMIN/CREAT UR: 123 MG/G (ref 0–30)
MISCELLANEOUS LAB RESULT MISCLAB: ABNORMAL

## 2025-01-17 PROCEDURE — 82043 UR ALBUMIN QUANTITATIVE: CPT

## 2025-01-17 PROCEDURE — 82565 ASSAY OF CREATININE: CPT

## 2025-01-17 PROCEDURE — 82570 ASSAY OF URINE CREATININE: CPT

## 2025-01-17 PROCEDURE — 36415 COLL VENOUS BLD VENIPUNCTURE: CPT

## 2025-01-21 ENCOUNTER — TELEPHONE (OUTPATIENT)
Dept: VASCULAR LAB | Facility: MEDICAL CENTER | Age: 55
End: 2025-01-21
Payer: MEDICAID

## 2025-01-21 PROCEDURE — RXMED WILLOW AMBULATORY MEDICATION CHARGE: Performed by: FAMILY MEDICINE

## 2025-01-22 NOTE — TELEPHONE ENCOUNTER
Received New Start request via  EMR Message   for Fenofibric Acid 135mg Capsule. (Quantity:100, Day Supply:100)     Insurance: [place] [employer]  Member ID:  [id number]  BIN: [id number]  PCN: [id number]  Group: [id number]     Ran Test claim via Joanna & medication Rejects stating prior authorization is required.    Tasneem Felipe  Pharmacy Liaison  [facility]  [phone]  1/21/2025 4:09 PM

## 2025-01-22 NOTE — TELEPHONE ENCOUNTER
Prior Authorization for Fenofibric Acid 135mg Capsule has been approved for a quantity of 30 , day supply 30    Prior Authorization reference number: [id number]  Insurance: [place] [employer]  Effective dates: 1/21/25--1/21/26  Copay: $0     Is patient eligible to fill with [facility]? Yes    Next Steps:  Pt already fills with [facility]. Medication released to [facility] and pt is picking up today.    Tasneem Felipe  Pharmacy Liaison  [facility]  [phone]  1/21/2025 4:11 PM

## 2025-01-22 NOTE — TELEPHONE ENCOUNTER
Prior Authorization for Fenofibric Acid 135mg Capsule (Quantity: 100, Days: 100) has been submitted via Cover My Meds: Key [id number])    Insurance: [employer]    Will follow up in 24-48 business hours.     Tasneem Felipe  Pharmacy Liaison  [facility]  [phone]  1/21/2025 4:10 PM

## 2025-01-27 PROCEDURE — RXMED WILLOW AMBULATORY MEDICATION CHARGE: Performed by: NURSE PRACTITIONER

## 2025-01-29 ENCOUNTER — PHARMACY VISIT (OUTPATIENT)
Dept: PHARMACY | Facility: MEDICAL CENTER | Age: 55
End: 2025-01-29
Payer: COMMERCIAL

## 2025-01-29 ENCOUNTER — NON-PROVIDER VISIT (OUTPATIENT)
Dept: MEDICAL GROUP | Facility: MEDICAL CENTER | Age: 55
End: 2025-01-29
Attending: NURSE PRACTITIONER
Payer: MEDICAID

## 2025-01-29 VITALS — HEIGHT: 66 IN | WEIGHT: 181.8 LBS | BODY MASS INDEX: 29.22 KG/M2

## 2025-01-29 PROCEDURE — RXMED WILLOW AMBULATORY MEDICATION CHARGE: Performed by: NURSE PRACTITIONER

## 2025-01-29 PROCEDURE — 99213 OFFICE O/P EST LOW 20 MIN: CPT | Performed by: PHARMACIST

## 2025-01-29 NOTE — PROGRESS NOTES
New Patient Consult Note  Primary care physician: DELMIS Gutierrez  11:30-12:15pm   Reason for consult: Management of Uncontrolled Type 2 Diabetes    HPI:  Divya Damon is a 54 y.o. old patient who comes in today for evaluation of above stated problem.    Pt is tolerating the new medication from last visit, including ozempic. He says he experience a bit less appetite, but mild nausea nausea at night. He even lowered cigarettes to 8-9 a day.    He has not been able to get humalog due to a refill too soon so he stopped using as much humalog for past few days, and stopped using it at all for last 2-3 days.  He has using the sliding scale + 4 units with meals but would only inject around 6-8 units with TWO of the 3 meals he eats a day (skips lunch).       Most Recent HbA1c:   Lab Results   Component Value Date/Time    HBA1C 12.3 (H) 01/07/2025 07:54 AM    HBA1C 7.0 (A) 03/28/2023 09:22 AM    HBA1C 6.4 (A) 10/14/2022 04:50 PM               Current Diabetes Regimen:  GLP-1 Agent: ozempic 0.25mg weekly   Metformin + SGLT-2 Inhibitor: Dapagiflozin 5/1000 BID   Basal Insulin: Lantus 10 units qhs   Prandial Insulin: Humalog 6-8 units BID  (but he eats 3 meals a day)   4 units + Sliding scale    stopped for last 2-3 days because he was  running out       Dexcom CGM     Hypoglycemia:  None      ROS:  Constitutional: No weight loss  Cardiac: No palpitations or racing heart  Resp: No shortness of breath  Neuro: No numbness or tinging in feet  Endo: No heat or cold intolerance, no polyuria or polydipsia  All other systems were reviewed and were negative.    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    Familial hypertriglyceridemia 01/09/2025    Pseudohyponatremia 01/09/2025    Encounter to establish care 10/17/2022    Other hyperlipidemia 06/07/2022    Paroxysmal atrial fibrillation (HCC) 06/07/2022    Type 2 diabetes mellitus without  complication, without long-term current use of insulin (HCC) 06/07/2022    Chronic idiopathic gout of foot 06/07/2022       Past Surgical History:  Past Surgical History:   Procedure Laterality Date    LYMPH NODE EXCISION         Allergies:  Pcn [penicillins] and Trazodone    Social History:  Social History     Socioeconomic History    Marital status: Single     Spouse name: Not on file    Number of children: Not on file    Years of education: Not on file    Highest education level: Not on file   Occupational History    Not on file   Tobacco Use    Smoking status: Every Day     Current packs/day: 0.50     Average packs/day: 0.5 packs/day for 40.1 years (20.0 ttl pk-yrs)     Types: Cigarettes     Start date: 1985    Smokeless tobacco: Never   Vaping Use    Vaping status: Never Used   Substance and Sexual Activity    Alcohol use: Not Currently     Comment: occ    Drug use: Yes     Types: Methamphetamines     Comment: patient quit meth 09/09/22    Sexual activity: Yes     Partners: Male     Birth control/protection: Condom   Other Topics Concern    Not on file   Social History Narrative    Not on file     Social Drivers of Health     Financial Resource Strain: Not on file   Food Insecurity: Not on file   Transportation Needs: Not on file   Physical Activity: Not on file   Stress: Not on file   Social Connections: Not on file   Intimate Partner Violence: Not on file   Housing Stability: Not on file       Family History:  Family History   Problem Relation Age of Onset    Cancer Mother         Lung    Alzheimer's Disease Mother     Psychiatric Illness Father         PTSD    Stroke Father     Cancer Father         throat    Lung Disease Sister     Cancer Sister         Lung, bones, brain    Diabetes Sister         5 sister T1DM    Psychiatric Illness Brother         PTSD    Diabetes Brother         5 siblings are T2DM    Familial Hypercholesterolemia Neg Hx        Medications:    Current Outpatient Medications:      Dapagliflozin Pro-metFORMIN ER 5-1000 MG TABLET SR 24 HR, Take 1 tablet by mouth 2 times a day, Disp: 180 Tablet, Rfl: 5    Continuous Glucose Sensor (DEXCOM G7 SENSOR) Misc, Use changing every 10 days, Disp: 3 Each, Rfl: 5    insulin glargine 100 UNIT/ML SC SOPN injection, Inject 10 Units under the skin every evening. Will increase up to 20 as directed., Disp: 6 mL, Rfl: 5    insulin lispro 100 UNIT/ML SC SOPN injection PEN, Use 2- 4 units before meals. Add per SS. 150-200- 1 unit, 200-250 - 2 units , 250-300 - 3 units, 300-350 - 4 units, 350-400 - 5 units, 400+ 6 units . Max 30 units a day, Disp: 9 mL, Rfl: 5    Semaglutide,0.25 or 0.5MG/DOS, (OZEMPIC, 0.25 OR 0.5 MG/DOSE,) 2 MG/3ML Solution Pen-injector, Inject 0.25 mg under the skin every 7 days. Increase to 0.5mg weekly when tolerated., Disp: 3 mL, Rfl: 11    losartan (COZAAR) 25 MG Tab, Take 1 Tablet by mouth every day., Disp: 30 Tablet, Rfl: 5    Choline Fenofibrate (FENOFIBRIC ACID) 135 MG CAPSULE DELAYED RELEASE, Take 1 Capsule by mouth every day. To lower triglycerides, Disp: 100 Capsule, Rfl: 3    Insulin Pen Needle, Use with insulin pen as directed, Disp: 100 Each, Rfl: 5    atorvastatin (LIPITOR) 40 MG Tab, Take 1 Tablet by mouth every evening., Disp: 100 Tablet, Rfl: 3    glucose blood strip, Test blood sugar as recommended by provider.  blood glucose monitoring kit., Disp: 300 Strip, Rfl: 11    Blood Glucose Monitoring Suppl (TRUE METRIX METER) w/Device Kit, Use one strip to test blood sugar three times daily., Disp: 1 Kit, Rfl: 11    TechLite AST Lancets Misc, Use one lancet to test blood sugar three times daily., Disp: 300 Each, Rfl: 11    Alcohol Swabs, Wipe site with prep pad prior to injection., Disp: 300 Each, Rfl: 11    allopurinol (ZYLOPRIM) 300 MG Tab, Take 1 Tablet by mouth 2 times a day., Disp: 30 Tablet, Rfl: 4    methylPREDNISolone (MEDROL DOSEPAK) 4 MG Tablet Therapy Pack, Use as directed, Disp: 21 Each, Rfl: 0    doxepin (SINEQUAN) 10  "MG Cap, Take 1 Capsule by mouth every evening., Disp: 30 Capsule, Rfl: 1    acetaminophen (TYLENOL) 500 MG Tab, Take 1-2 Tablets by mouth every 6 hours as needed for Moderate Pain., Disp: 60 Tablet, Rfl: 0    ibuprofen (MOTRIN) 600 MG Tab, Take 1 Tablet by mouth every 6 hours as needed for Headache, Inflammation or Moderate Pain., Disp: 90 Tablet, Rfl: 1    acetaminophen (TYLENOL) 500 MG Tab, Take 1-2 Tablets by mouth every 6 hours as needed for Moderate Pain or Mild Pain., Disp: 30 Tablet, Rfl: 2    Labs: Reviewed    Physical Examination:  Vital signs: Ht 1.676 m (5' 6\")   Wt 82.5 kg (181 lb 12.8 oz)   BMI 29.34 kg/m²  Body mass index is 29.34 kg/m².  General: No apparent distress, cooperative  Eyes: No scleral icterus or discharge  ENMT: Normal on external inspection of nose, lips, normal thyroid exam  Neck: No abnormal masses on inspection  Resp: Normal effort, clear to auscultation bilaterally   CVS: Regular rate and rhythm, S1 S2 normal, no murmur   Extremities: No edema  Abdomen: abdominal obesity present  Neuro: Alert and oriented  Skin: No rash  Psych: Normal mood and affect, intact memory and able to make informed decisions    Plan:    There are no diagnoses linked to this encounter.    No follow-ups on file.    Dm - patient's BG is better but still much above goal. Need to increases doses of his medication and get him back on humalog, and for all meals, not just 2 meals.   - Increase ozempic 0.5mg weeky  - Humalog 4 units with ALL meals + SS - TID    Spoke to pharmacy to call insurance for new dose early fill, he will p/u after appt   - increase Lantus 13 units qhs   Thank you for allowing me to participate in the care of this patient.    Jesus Khanna, PharmD    01/29/25    CC:   Jael Morejon, A.PAlvaRAlvaNAlva      "

## 2025-01-30 ENCOUNTER — NON-PROVIDER VISIT (OUTPATIENT)
Dept: VASCULAR LAB | Facility: MEDICAL CENTER | Age: 55
End: 2025-01-30
Attending: INTERNAL MEDICINE
Payer: MEDICAID

## 2025-01-30 DIAGNOSIS — E11.9 TYPE 2 DIABETES MELLITUS WITHOUT COMPLICATION, WITHOUT LONG-TERM CURRENT USE OF INSULIN (HCC): ICD-10-CM

## 2025-01-30 PROCEDURE — 99213 OFFICE O/P EST LOW 20 MIN: CPT

## 2025-01-30 NOTE — PATIENT INSTRUCTIONS
Take Xigduo ER 5/1000 mg BID    Inject Ozempic 0.5 mg weekly    Inject Lantus 16 units daily. Increase by 2 units every 3 days if blood sugar before breakfast continues to be above 130.    Inject Insulin lispro 4 units + sliding scale before meals

## 2025-01-30 NOTE — PROGRESS NOTES
Patient Consult Note    Primary care physician: KAMLA GutierrezPAlvaRJACQUES    Reason for Consult: Management of Uncontrolled Type 2 Diabetes    Date Referral Placed: 01/09/25    HPI:  Divya Damon is a 54 y.o. old patient who comes in today for evaluation of above stated problem. Patient was seen by [facility] PharmD and medications were just adjusted yesterday.    Allergies  Pcn [penicillins] and Trazodone    Current Diabetes Medication Regimen  Metformin/SGLT-2 Inhibitor: Xigduo ER 5/1000 mg BID  GLP-1 or GLP-1/GIP Agent: semaglutide (Ozempic) 0.5 mg weekly  Basal Insulin: Lantus 13 units daily  Prandial Insulin: Insulin lispro 4 units + sliding scale before meals    Previous Diabetes Medications and Reason for Discontinuation  None    Potential Barriers to Care:  Adherence: denies missed doses  Side effects: none  Affordability: no issues    SMBG  Pt has home glucometer and proper testing technique - uses Dexcom G7 sensors and reader      Hypoglycemia  Hypoglycemia awareness: Yes  Nocturnal hypoglycemia: None  Hypoglycemia:  None  Pt's treatment of Hypoglycemia  Discussed 15:15 Rule    Lifestyle  Current Exercise - walking 15-20 mins daily, working on increasing this    Dietary  Breakfast - bread, egg  Lunch/Dinner - rice (pt states he is reduced his portion), meat/chicken/fish, veggies/salad  Snacks - bread with cheese/peanut butter/liver spread  Drinks - water; pt was previously drinking a lot of soda, but eliminated this. Occasionally drinks zero sugar soda.    Labs  Lab Results   Component Value Date/Time    HBA1C 12.3 (H) 01/07/2025 07:54 AM    HBA1C 7.0 (A) 03/28/2023 09:22 AM    HBA1C 6.4 (A) 10/14/2022 04:50 PM      Lab Results   Component Value Date/Time    SODIUM 124 (L) 01/10/2025 09:00 AM    POTASSIUM 3.7 01/10/2025 09:00 AM    CHLORIDE 91 (L) 01/10/2025 09:00 AM    CO2 19 (L) 01/10/2025 09:00 AM    GLUCOSE 312 (H) 01/10/2025 09:00 AM    BUN 13 01/10/2025 09:00 AM    CREATININE 0.84 01/17/2025 09:07  AM     Lab Results   Component Value Date/Time    ALKPHOSPHAT 129 (H) 01/10/2025 09:00 AM    ASTSGOT 20 01/10/2025 09:00 AM    ALTSGPT 30 01/10/2025 09:00 AM    TBILIRUBIN 0.3 01/10/2025 09:00 AM    ALBUMIN 3.8 01/10/2025 09:00 AM      Lab Results   Component Value Date/Time    CHOLSTRLTOT 785 (H) 01/10/2025 09:00 AM    LDL see below 01/10/2025 09:00 AM    HDL see below 01/10/2025 09:00 AM    TRIGLYCERIDE 2841 (H) 01/10/2025 09:00 AM       Lab Results   Component Value Date/Time    MALBCRT 123 (H) 01/17/2025 09:06 AM    MICROALBUR 8.7 01/17/2025 09:06 AM       Physical Examination:  Vital signs: There were no vitals taken for this visit. There is no height or weight on file to calculate BMI.    Assessment and Plan:    1. DM2  Basic physiology of DMII was explained to patient as well as microvascular/macrovascular complications. The importance of increasing physical activity to improve diabetes control was discussed with the patient. Patient was also educated on changing diet and making better choices to help control blood sugar.   Discussed Goals: FBG 80 - 130, 2hPP < 180, a1c < 7.0%  Last a1c drawn on 01/07/25 was 12.3%, which is not at goal and has worsened since the previous reading  TIR not at goal of > 70% but DM control shows overall improvement with GMI of 10%  Patient has made lifestyle modifications; encouraged him to continue this  His insulin doses and Ozempic dose was just increased at his appt yesterday. Full effect of Ozempic dose increase will take several weeks to be observed, however suspect he will continue to need basal insulin titration over the next weeks. Will provide patient with titration instructions for basal insulin. Of note, ensured that pt is comfortable with titrating by himself (pt was a former RN).  Will defer further mgmt to [facility] PharmD to limit visits and care team members    - Medication changes:  If FBG after 3 days is still above 130, increase Lantus to 16 units daily. Increase by  2 units every 3 days if blood sugar before breakfast continues to be above 130.  - Continue:  Xigduo ER 5/1000 mg BID  Ozempic 0.5 mg weekly   Insulin lispro 4 units + sliding scale before meals    - Lifestyle changes:  Exercise Goal - increase as tolerated  Dietary Goal - continue current dietary modifications. Maximize lean proteins and non-starchy veggies.    - Preventative management:  REC DM Score: 3  Care gaps addressed:   A1c is above 8%: Optimized DM medications/management  LDL is above 100: Managed by Vascular Medicine  Tobacco use is addressed: Discussed tobacco use status and updated Social Determinants  Care gaps updated in Health Maintenance    Follow Up:  PRN. Pt will follow up with [facility] PharmD    Yoan Smith, PharmD    CC:   DELMIS Gutierrez              [facility] Consent      Name    Divya Damon    MRN number: [id number]    the following are guidelines for participation in the [facility].     I, ____Divya Damon_____, understand and voluntarily agree to participate in the [facility] and to have services provided to me by pharmacists working in collaboration with my provider.    I understand the pharmacist within the [facility] may initiate, modify or discontinue my medications, order appropriate testing and appointments, perform exams, monitor treatment, and make clinical evaluations and decisions pursuant to a collaborative practice agreement with my provider.  I understand the pharmacist within the [facility] is not a physician, osteopathic physician, advanced practice registered nurse or physician assistant and may not diagnose.  I will take all my medications as instructed and not change the way I take it without first talking to my provider or a pharmacist within the [facility].  I understand that if I am  late to my appointment I may not be able to be seen by a pharmacist at that time and will have to reschedule my appointment.  During appointment with pharmacist I understand that pharmacist has the right not to answer questions or perform services outside the pharmacist’s scope of practice.  By signing below, I provide informed consent for the pharmacist to provide these services and for my participation in the [facility] Program.      Divya Damon           [id number]          01/30/25  Patient Name                   MRN number  Date     ____Obtained verbal consent from Divya Damon____  Patient Signature

## 2025-02-01 ENCOUNTER — PHARMACY VISIT (OUTPATIENT)
Dept: PHARMACY | Facility: MEDICAL CENTER | Age: 55
End: 2025-02-01
Payer: COMMERCIAL

## 2025-02-01 PROCEDURE — RXMED WILLOW AMBULATORY MEDICATION CHARGE: Performed by: NURSE PRACTITIONER

## 2025-02-03 ENCOUNTER — TELEPHONE (OUTPATIENT)
Dept: MEDICAL GROUP | Facility: MEDICAL CENTER | Age: 55
End: 2025-02-03
Payer: MEDICAID

## 2025-02-03 NOTE — TELEPHONE ENCOUNTER
FINAL PRIOR AUTHORIZATION STATUS:    1.  Name of Medication & Dose:  Continuous Glucose Sensor (DEXCOM G7 SENSOR) Misc [[id number]]        2. Prior Auth Status: Denied.  Reason: scanned under .    3. Action Taken: Pharmacy Notified: yes Patient Notified: yes

## 2025-02-05 ENCOUNTER — PHARMACY VISIT (OUTPATIENT)
Dept: PHARMACY | Facility: MEDICAL CENTER | Age: 55
End: 2025-02-05
Payer: COMMERCIAL

## 2025-02-05 PROCEDURE — RXMED WILLOW AMBULATORY MEDICATION CHARGE: Performed by: FAMILY MEDICINE

## 2025-02-05 PROCEDURE — RXMED WILLOW AMBULATORY MEDICATION CHARGE: Performed by: NURSE PRACTITIONER

## 2025-02-10 ENCOUNTER — PHARMACY VISIT (OUTPATIENT)
Dept: PHARMACY | Facility: MEDICAL CENTER | Age: 55
End: 2025-02-10
Payer: COMMERCIAL

## 2025-02-10 PROCEDURE — RXMED WILLOW AMBULATORY MEDICATION CHARGE: Performed by: DENTIST

## 2025-02-10 RX ORDER — CHLORHEXIDINE GLUCONATE ORAL RINSE 1.2 MG/ML
SOLUTION DENTAL
Qty: 473 ML | Refills: 0 | Status: SHIPPED | OUTPATIENT
Start: 2025-02-10 | End: 2025-02-20 | Stop reason: SDUPTHER

## 2025-02-10 RX ORDER — CLINDAMYCIN HYDROCHLORIDE 300 MG/1
CAPSULE ORAL
Qty: 20 CAPSULE | Refills: 0 | OUTPATIENT
Start: 2025-02-10

## 2025-02-11 PROCEDURE — RXMED WILLOW AMBULATORY MEDICATION CHARGE: Performed by: NURSE PRACTITIONER

## 2025-02-12 ENCOUNTER — PHARMACY VISIT (OUTPATIENT)
Dept: PHARMACY | Facility: MEDICAL CENTER | Age: 55
End: 2025-02-12
Payer: COMMERCIAL

## 2025-02-12 ENCOUNTER — TELEPHONE (OUTPATIENT)
Dept: VASCULAR LAB | Facility: MEDICAL CENTER | Age: 55
End: 2025-02-12

## 2025-02-12 ENCOUNTER — OFFICE VISIT (OUTPATIENT)
Dept: VASCULAR LAB | Facility: MEDICAL CENTER | Age: 55
End: 2025-02-12
Attending: FAMILY MEDICINE
Payer: MEDICAID

## 2025-02-12 VITALS
SYSTOLIC BLOOD PRESSURE: 123 MMHG | WEIGHT: 185 LBS | HEART RATE: 90 BPM | BODY MASS INDEX: 29.73 KG/M2 | HEIGHT: 66 IN | DIASTOLIC BLOOD PRESSURE: 76 MMHG

## 2025-02-12 DIAGNOSIS — E11.29 MICROALBUMINURIA DUE TO TYPE 2 DIABETES MELLITUS (HCC): ICD-10-CM

## 2025-02-12 DIAGNOSIS — E78.1 FAMILIAL HYPERTRIGLYCERIDEMIA: ICD-10-CM

## 2025-02-12 DIAGNOSIS — M1A.0790 IDIOPATHIC CHRONIC GOUT OF FOOT WITHOUT TOPHUS, UNSPECIFIED LATERALITY: ICD-10-CM

## 2025-02-12 DIAGNOSIS — R74.8 ELEVATED ALKALINE PHOSPHATASE LEVEL: ICD-10-CM

## 2025-02-12 DIAGNOSIS — E11.59 TYPE 2 DIABETES MELLITUS WITH OTHER CIRCULATORY COMPLICATION, WITHOUT LONG-TERM CURRENT USE OF INSULIN (HCC): ICD-10-CM

## 2025-02-12 DIAGNOSIS — E78.3 CHYLOMICRONEMIA SYNDROME: ICD-10-CM

## 2025-02-12 DIAGNOSIS — I15.2 HYPERTENSION ASSOCIATED WITH TYPE 2 DIABETES MELLITUS (HCC): ICD-10-CM

## 2025-02-12 DIAGNOSIS — R80.9 MICROALBUMINURIA DUE TO TYPE 2 DIABETES MELLITUS (HCC): ICD-10-CM

## 2025-02-12 DIAGNOSIS — E11.59 HYPERTENSION ASSOCIATED WITH TYPE 2 DIABETES MELLITUS (HCC): ICD-10-CM

## 2025-02-12 DIAGNOSIS — R79.89 PSEUDOHYPONATREMIA: ICD-10-CM

## 2025-02-12 DIAGNOSIS — K74.3 PRIMARY BILIARY CHOLANGITIS (HCC): ICD-10-CM

## 2025-02-12 PROCEDURE — 3074F SYST BP LT 130 MM HG: CPT | Performed by: FAMILY MEDICINE

## 2025-02-12 PROCEDURE — 99212 OFFICE O/P EST SF 10 MIN: CPT

## 2025-02-12 PROCEDURE — 3078F DIAST BP <80 MM HG: CPT | Performed by: FAMILY MEDICINE

## 2025-02-12 PROCEDURE — 99214 OFFICE O/P EST MOD 30 MIN: CPT | Performed by: FAMILY MEDICINE

## 2025-02-12 RX ORDER — TELMISARTAN 40 MG/1
40 TABLET ORAL DAILY
Qty: 100 TABLET | Refills: 3 | Status: SHIPPED | OUTPATIENT
Start: 2025-02-12 | End: 2025-02-14

## 2025-02-12 RX ORDER — OMEGA-3-ACID ETHYL ESTERS 1 G/1
2 CAPSULE, LIQUID FILLED ORAL 2 TIMES DAILY
Qty: 360 CAPSULE | Refills: 3 | Status: SHIPPED | OUTPATIENT
Start: 2025-02-12

## 2025-02-12 ASSESSMENT — ENCOUNTER SYMPTOMS
HEMOPTYSIS: 0
COUGH: 0
ORTHOPNEA: 0
FEVER: 0
SPUTUM PRODUCTION: 0
WHEEZING: 0
PALPITATIONS: 0
CHILLS: 0
PND: 0
CLAUDICATION: 0
SHORTNESS OF BREATH: 0

## 2025-02-12 NOTE — TELEPHONE ENCOUNTER
DILMA    Caller: Divya Damon     Topic/issue: patient went to  her medications and she was told they need to have a prior authorization sent in. Please reach out once it has been approved    Callback Number: [phone]     Thank You  Nohelia FUNEZ

## 2025-02-12 NOTE — PROGRESS NOTES
FOLLOW-UP [facility]   02/12/25      Divya Damon, intially referred for evaluation/mgmt of dyslipidemia, est 1/2025  Referral Source: Jean-Pierre, Jael VANEGAS A.P.RN    Subjective      Interval hx/concerns: last seen 1/2025, had multiple labs done. Reports feeling well. Tolerating meds.  Had labs done.  BS has improved and using CGM     Anti-mukesh ab high - presumed PBC - needs GI    apoB 142 , direct LDL 78,  APOE2/E3  hsCRP 6.7  pseudoNa due to severe hyperglyc  TG 2841,   TERRIE, C-peptide wnl      CURRENT MED MGMT  Current Rx:   Statin: atorva 40mg  Non-Statin: fenofibric acid 135mg   Supplements: None  Current ADRs/side effects? no  Adherence? Yes     LIFESTYLE MGMT  Barriers to care/SDOH: medicaid  Tobacco:  reports that he has been smoking cigarettes. He started smoking about 40 years ago. He has a 20.1 pack-year smoking history. He has never used smokeless tobacco.   Change in weight: Stable  Exercise habits: minimal exercise  Dietary patterns: common adult  Etoh: see sochx    PERTINENT HLD PMHX  Initial visit history: seen by PCP 1/6/25 and noted to have hyperglycemia, new dx T2D with gluc 300s.  Had received insulin while incarcerated and now on insulin glargine and lispro SSI along with metformin.   Severe HTG >4000 noted.  Prior max in 2022 was 215.   Currently denies Etoh intake.   Known gout on prednisone.   Started atorva and fenofibrate on 1/6/25.   No hx of acute pancreatitis.  Stopped meth 8 months ago.  No prior etoh use.    Age at Initial Dx: 51 though cannot recall if had labs earlier in life   Baseline Lipids Prior to Treatment:    Latest Reference Range & Units 06/08/22 15:42 01/07/25 07:54   Cholesterol,Tot 100 - 199 mg/dL 181 1007 (H)   Triglycerides 0 - 149 mg/dL 215 (H) >4425 (H)   HDL >=40 mg/dL 55 see below   LDL <100 mg/dL 83 see below     History of Major ASCVD: None  Other Established Vascular Disease: None  Secondary contributors/causes of dyslipidemia: severe  uncontrolled T2D    Previous lipid-lowering meds and outcome:  gemfibrozil - recently stopped     OTHER CV RISK FACTORS:    Hx of PAF (2022) - reports resolved.  No current cardiology    HTN: no prior dx or meds, not checking home BP   Dysglycemia: yes, T2D with currently a1c 12.3 and gluc 679    Antithrombotic tx: none  - no hx of bleeding        Current Outpatient Medications on File Prior to Visit   Medication Sig Dispense Refill    clindamycin (CLEOCIN) 300 MG Cap take 1 capsule by mouth 2 times daily until gone 20 Capsule 0    chlorhexidine (PERIDEX) 0.12 % Solution swish for 30 seconds and spit 15 ml by mouth in the morning and evening after tooth brushing 473 mL 0    insulin glargine 100 UNIT/ML SC SOPN injection Inject 16 units subq daily or as directed (max 50 units/day) 15 mL 5    Dapagliflozin Pro-metFORMIN ER 5-1000 MG TABLET SR 24 HR Take 1 tablet by mouth 2 times a day 180 Tablet 5    Continuous Glucose Sensor (DEXCOM G7 SENSOR) Misc Use changing every 10 days 3 Each 5    Semaglutide,0.25 or 0.5MG/DOS, (OZEMPIC, 0.25 OR 0.5 MG/DOSE,) 2 MG/3ML Solution Pen-injector Inject 0.25 mg under the skin every 7 days. Increase to 0.5mg weekly when tolerated. 3 mL 11    Choline Fenofibrate (FENOFIBRIC ACID) 135 MG CAPSULE DELAYED RELEASE Take 1 Capsule by mouth every day. To lower triglycerides 100 Capsule 3    Insulin Pen Needle Use with insulin pen as directed 100 Each 5    atorvastatin (LIPITOR) 40 MG Tab Take 1 Tablet by mouth every evening. 100 Tablet 3    glucose blood strip Test blood sugar as recommended by provider.  blood glucose monitoring kit. 300 Strip 11    Blood Glucose Monitoring Suppl (TRUE METRIX METER) w/Device Kit Use one strip to test blood sugar three times daily. 1 Kit 11    TechLite AST Lancets Misc Use one lancet to test blood sugar three times daily. 300 Each 11    Alcohol Swabs Wipe site with prep pad prior to injection. 300 Each 11    allopurinol (ZYLOPRIM) 300 MG Tab Take 1 Tablet by  "mouth 2 times a day. 30 Tablet 4    methylPREDNISolone (MEDROL DOSEPAK) 4 MG Tablet Therapy Pack Use as directed 21 Each 0    doxepin (SINEQUAN) 10 MG Cap Take 1 Capsule by mouth every evening. 30 Capsule 1    ibuprofen (MOTRIN) 600 MG Tab Take 1 Tablet by mouth every 6 hours as needed for Headache, Inflammation or Moderate Pain. 90 Tablet 1    acetaminophen (TYLENOL) 500 MG Tab Take 1-2 Tablets by mouth every 6 hours as needed for Moderate Pain or Mild Pain. 30 Tablet 2     No current facility-administered medications on file prior to visit.      Allergies   Allergen Reactions    Pcn [Penicillins]     Trazodone       Social History     Tobacco Use    Smoking status: Every Day     Current packs/day: 0.50     Average packs/day: 0.5 packs/day for 40.1 years (20.1 ttl pk-yrs)     Types: Cigarettes     Start date: 1985    Smokeless tobacco: Never    Tobacco comments:     Patient is working on reducing cigarette use. He cut back from 15 cigarettes per day to 8-9 cigarettes per day.   Vaping Use    Vaping status: Never Used   Substance Use Topics    Alcohol use: Not Currently     Comment: occ    Drug use: Yes     Types: Methamphetamines     Comment: patient quit meth 09/09/22     Review of Systems   Constitutional:  Negative for chills, fever and malaise/fatigue.   Respiratory:  Negative for cough, hemoptysis, sputum production, shortness of breath and wheezing.    Cardiovascular:  Negative for chest pain, palpitations, orthopnea, claudication, leg swelling and PND.         Objective    Vitals:    02/12/25 0842 02/12/25 0845   BP: (!) 145/78 123/76   BP Location: Left arm Left arm   Patient Position: Sitting Sitting   BP Cuff Size: Large adult Large adult   Pulse: (!) 102 90   Weight: 83.9 kg (185 lb)    Height: 1.676 m (5' 6\")      BP Readings from Last 5 Encounters:   02/13/25 112/71   02/12/25 123/76   01/15/25 113/87   01/09/25 (!) 128/90   01/06/25 116/62     BMI Readings from Last 1 Encounters:   02/13/25 29.78 " kg/m²     Wt Readings from Last 3 Encounters:   02/13/25 83.7 kg (184 lb 8 oz)   02/12/25 83.9 kg (185 lb)   01/29/25 82.5 kg (181 lb 12.8 oz)     Physical Exam  Constitutional:       General: He is not in acute distress.     Appearance: Normal appearance. He is not ill-appearing.   HENT:      Head: Normocephalic and atraumatic.   Eyes:      Conjunctiva/sclera: Conjunctivae normal.      Comments: No corneal arcus   Neck:      Vascular: No carotid bruit.   Cardiovascular:      Rate and Rhythm: Normal rate and regular rhythm.      Pulses:           Carotid pulses are 2+ on the right side and 2+ on the left side.       Radial pulses are 2+ on the right side and 2+ on the left side.        Popliteal pulses are 2+ on the right side and 2+ on the left side.        Dorsalis pedis pulses are 2+ on the right side and 2+ on the left side.      Heart sounds: No murmur heard.  Pulmonary:      Effort: Pulmonary effort is normal.      Breath sounds: Normal breath sounds.   Musculoskeletal:      Cervical back: Normal range of motion.      Right lower leg: No edema.      Left lower leg: No edema.   Feet:      Comments: No achilles thickening or nodularity  Skin:     General: Skin is warm and dry.      Coloration: Skin is not cyanotic or mottled.      Findings: No wound.      Comments: No tendon xanthomas,eruptive xanthomas, or palmar crease xanthomas    Neurological:      General: No focal deficit present.      Mental Status: He is alert.   Psychiatric:         Mood and Affect: Mood normal.       DATA REVIEW:  Most Recent Lipid Panel:   Lab Results   Component Value Date/Time    CHOLSTRLTOT 785 (H) 01/10/2025 09:00 AM    LDL see below 01/10/2025 09:00 AM    HDL see below 01/10/2025 09:00 AM    TRIGLYCERIDE 2841 (H) 01/10/2025 09:00 AM     Lab Results   Component Value Date/Time    LDL see below 01/10/2025 09:00 AM    LDL see below 01/07/2025 07:54 AM    LDL 83 06/08/2022 03:42 PM     Lab Results   Component Value Date/Time     LIPOPROTA 19 01/10/2025 09:00 AM      Lab Results   Component Value Date/Time    APOB 142 (H) 01/10/2025 09:00 AM      Lab Results   Component Value Date/Time    CRPHIGHSEN 6.7 (H) 01/10/2025 09:00 AM       Other Pertinent Blood Work:   Lab Results   Component Value Date    SODIUM 124 (L) 01/10/2025    POTASSIUM 3.7 01/10/2025    CHLORIDE 91 (L) 01/10/2025    CO2 19 (L) 01/10/2025    ANION 14.0 01/10/2025    GLUCOSE 312 (H) 01/10/2025    BUN 13 01/10/2025    CREATININE 0.84 01/17/2025    CALCIUM 8.7 01/10/2025    ASTSGOT 20 01/10/2025    ALTSGPT 30 01/10/2025    ALKPHOSPHAT 129 (H) 01/10/2025    TBILIRUBIN 0.3 01/10/2025    ALBUMIN 3.8 01/10/2025    AGRATIO 1.2 01/10/2025    TSHULTRASEN 2.040 01/07/2025     Lab Results   Component Value Date/Time    HBA1C 12.3 (H) 01/07/2025 07:54 AM    HBA1C 7.0 (A) 03/28/2023 09:22 AM    HBA1C 6.4 (A) 10/14/2022 04:50 PM       Lab Results   Component Value Date/Time    MALBCRT 123 (H) 01/17/2025 09:06 AM    MICROALBUR 8.7 01/17/2025 09:06 AM       Latest Reference Range & Units 01/10/25 09:00   C-Peptide 0.5 - 3.3 ng/mL 2.4      Latest Reference Range & Units 01/10/25 09:00   TERRIE Antibody 0.0 - 5.0 IU/mL <5.0      Latest Reference Range & Units 01/10/25 09:00   Anti-Mitochondrial Ab 0.0 - 24.9 Units 38.6 (H)       IMAGING: none       PROCEDURES: none            ASSESSMENT AND PLAN  1. Familial hypertriglyceridemia  APOLIPOPROTEIN B    Comp Metabolic Panel    LDL, DIRECT    Lipid Profile    omega-3 acid ethyl esters (LOVAZA) 1 GM capsule      2. Chylomicronemia syndrome  APOLIPOPROTEIN B    Comp Metabolic Panel    LDL, DIRECT    Lipid Profile    omega-3 acid ethyl esters (LOVAZA) 1 GM capsule      3. Type 2 diabetes mellitus with other circulatory complication, without long-term current use of insulin (HCC)  telmisartan (MICARDIS) 40 MG Tab      4. Microalbuminuria due to type 2 diabetes mellitus (HCC)  telmisartan (MICARDIS) 40 MG Tab      5. Hypertension associated with type 2  "diabetes mellitus (HCC)  telmisartan (MICARDIS) 40 MG Tab      6. Primary biliary cholangitis (HCC)  Referral to Gastroenterology      7. Elevated alkaline phosphatase level  Referral to Gastroenterology      8. Idiopathic chronic gout of foot without tophus, unspecified laterality        9. Pseudohyponatremia          PATIENT TYPE: Primary Prevention, Type 2 Diabetes Mellitus, and Metabolic Syndrome    MAJOR ASCVD: None      OTHER ESTABLISHED CVD:     1) hx of PAF - reports currently stable     EVIDENCE OF GENETIC DYSLIPIDEMIA: Yes   - severe high TC >1000 and TG >4000 indicative of type III FDBL as generally this is \"inducible\" severe TG - genetic + secondary factors   - ddx includes type IIb (FCHL), type V (multifactorial chylomicronemia syndrome) and less likely type I familial chylomicronemia syndrome (FCS) due to prior TG <500 consistently.    - unlikely type IIa (FH) due to severity of HTG     FH / LIPIDEMIA genotyping: YES, ordered via [facility]  Your order ID is [id number].  - still pending results   -  as has high risk for genetically mediated lipid condition and will help aid in definitive dx and tx including enrollment in apoC3i studies     Benefits/limitations/risks for dyslipidemic genetic testing reviewed.  Further resources for review available at [email or web address] site    Indications and rationale for dyslipidemia genetic testing:  -Strong clinical suspicion of a genetic dyslipidemia.  - assist with definitive diagnosis and aid in treatment decision-making  -Strong family history of dyslipidemia or its complications.  -Presence of related syndromic features  -Evidence that testing might .  -Available and effective early interventions exist.  -Eligibility for new or investigational drugs.  -Patient preference.  -Family planning  - informing need for cascade screening of family members     Benefits include:   -Confirmation of a clinical diagnosis especially in cases where it is not clear " whether the person genetic dyslipidemia   -Provides more information about one’s risk or diagnosis, since not all individuals present the same.  -Often results in initiation and intensification of therapy by a healthcare provider.   -Provides information regarding why a healthy lifestyle and diet have not been able to control cholesterol levels on their own.  -Helps other family members to be screened.  -Determines whether or not his condition has been passed down to a child, since everyone with may have a 50% chance of doing so.    APOE genotyping (for familial dysbetalipoproteinemia eval): HETEROZYGOUS APO e2/e3: This genotype is not significantly associated with an increased risk for type III hyperlipoproteinemia    SECONDARY CAUSES / CONTRIBUTORS: yes  Metabolic:  T2D - severely uncontrolled is significant contributor to IR leading to increase apoC3 expression - a strong inhibitor of LPL  Thyroid disease: excluded - normal TSH    Liver disease:     # high ALP with positive anti-mukesh ab screening, presumed primary biliary cholangitis   - this will increase cholesterol and LDL due to presence of lp-X in the LDL density range - generally not deemed atherogenic  Plan: ref to GI for further eval, will likely need UDCA therapy     Renal disease/nephrotic syndrome:  excluded   Dietary-induced (ketogenic, lean mass hyper-responder)? no  Medications: Corticosteroids  - acute tx for gout likely increasing expression of TGs   Alcohol use: none currently or in the past     ACC/AHA INDICATION FOR STATIN THERAPY:  Primary Severe HLD / FH (baseline LDL-C >190)  - severe HTG also important for statin therapy but ineffective if TG >1000 until LPL stabilizes     ASCVD RISK CALCULATIONS:  PCE: The ASCVD Risk score (Elizabeth DK, et al., 2019) failed to calculate., N/A    OTHER SIGNIFICANT RISK MARKERS:  High-risk conditions: N/A  Risk-enhancers: Persistently elevated LDL-C >159, Metabolic syndrome , Persistently elevated trigs >174,  and hs-CRP >1.9  Lipoprotein(a): Favorable (<30 mg/dl or <75 nmol/L    TARGETS / GOALS (per most recent ACC/AHA guidelines):  At goal as of 2/2025?   Primary (panc risk reduction):  TG <500 - NOT MET  Secondary (ascvd risk reduction): apoB <90-  NOT MET       Non-HDL-C <130 - NOT MET       Direct LDL-C <100 - NOT MET  Tertiary:    TG <150 - NOT MET     LIFESTYLE INTERVENTIONS  TOBACCO:  reports that he has been smoking cigarettes. He started smoking about 40 years ago. He has a 20.1 pack-year smoking history. He has never used smokeless tobacco.   - continued complete avoidance of all tobacco products   PHYSICAL ACTIVITY: at least 150 min per week of moderate intensity  NUTRITION: Chylomicron-clearing diet plan (low total fat calories with reduce CHO content)- provided specific handout due to severe hypertriglyceridemia , Ref to eval with RD to help with tx goals and meal planning , Weight reduction - reduce caloric intake by 300 - 500 kcal/day to achieve 1-2lb weight loss per week , and - handout provided   ETOH: complete abstinence recommended  WT MGMT:  focus on 5-7% reduction as initial goal     LIPID-LOWERING MEDICATION MANAGEMENT:     Statin Therapy:   Continue atorva 40mg daily - though minimal to no benefit for TG lowering until <1000     Non-Statin Therapies:     LDL/apoB therapies:  ZETIA: add as additional agent for 10-15% TG lowering   NEXLETOL: not currently indicated   BAS: contraindicated   PCSK9 mAb: not indicated   LEQVIO: not indicated     TG therapies:  OMEGA-3 FAs: start lovaza 2g BID, would avoid vascepa due to prior hx of AF   FOR PRIOR AUTH:   severe TG >500 despite current use of max-dose statin + fenofibric acid, needs further TG lowering with this agent to reduce risk for acute pancreatitis as per standard guidelines     FIBRATE:  continue fenofibric acid 135mg - better bioavail and FDA-approved for use with high potency statin   ApoC3i: possible candidate for research study enrollment      LP(a) modifying therapy: available in clinical research trials only at this time     PCSK9i strategy indications: Not currently indicated    RECOMMENDED SUPPLEMENTS: None     APHERESIS: n/a     BLOOD PRESSURE CONTROL   Office BP goal per ACC/AHA <130/80  Home BP at goal:  yes  Office BP at goal:  no  24h ABPM:  not ordered to date   RDN candidate? NO  Contributing factors: n/a    Plan:   - start/continue home BP monitoring, reviewed correct technique, provide BP log and instructions  - order 24h ABPM:  NO  - routine monitoring of lytes/gfr   Medications:  - though telmisartan would be preferred agent in light of best in MetS, HTG, apparently insurance will not covered, so I have opted to start valsartan 160mg daily     GLYCEMIC STATUS/MGMT: Diabetic, T2D, severely uncontrolled, consider SHARRI  - TERRIE and C-peptide indicative of T2D  Goal A1c < 7.0  Lab Results   Component Value Date    HBA1C 12.3 (H) 01/07/2025      Lab Results   Component Value Date/Time    MALBCRT 123 (H) 01/17/2025 09:06 AM    MICROALBUR 8.7 01/17/2025 09:06 AM     Plan:  - ongoing tx with pharm DM clinic   - continue current medication plan   - recommmend for routine care with PCP (or endocrine) to include regular A1c monitoring, annual albumin/creatinine ratio (ACR), annual diabetic retinopathy screening, foot exams, annual flu vaccine, and updates to pneumonia vaccines as appropriate      ANTITHROMBOTIC THERAPY: Not currently recommended    OTHER    # hx of meth abuse, in remission - continued abstinence     STUDIES:  none   LABS: as noted above  FOLLOW-UP: 4 weeks     Alexis Abbott M.D.  ASHLEY, board-certified Clinical Lipidologist   [facility]   [facility]   [phone]      CC:  MARLIN Gutierrez.R.N.  Jean-Pierre, MARLIN Brandon.*

## 2025-02-13 ENCOUNTER — NON-PROVIDER VISIT (OUTPATIENT)
Dept: MEDICAL GROUP | Facility: MEDICAL CENTER | Age: 55
End: 2025-02-13
Attending: INTERNAL MEDICINE
Payer: MEDICAID

## 2025-02-13 ENCOUNTER — PHARMACY VISIT (OUTPATIENT)
Dept: PHARMACY | Facility: MEDICAL CENTER | Age: 55
End: 2025-02-13
Payer: COMMERCIAL

## 2025-02-13 VITALS
HEIGHT: 66 IN | WEIGHT: 184.5 LBS | HEART RATE: 83 BPM | BODY MASS INDEX: 29.65 KG/M2 | SYSTOLIC BLOOD PRESSURE: 112 MMHG | DIASTOLIC BLOOD PRESSURE: 71 MMHG

## 2025-02-13 DIAGNOSIS — M1A.0790 IDIOPATHIC CHRONIC GOUT OF FOOT WITHOUT TOPHUS, UNSPECIFIED LATERALITY: ICD-10-CM

## 2025-02-13 DIAGNOSIS — E11.9 TYPE 2 DIABETES MELLITUS WITHOUT COMPLICATION, WITHOUT LONG-TERM CURRENT USE OF INSULIN (HCC): ICD-10-CM

## 2025-02-13 PROBLEM — K74.3 PRIMARY BILIARY CHOLANGITIS (HCC): Status: ACTIVE | Noted: 2025-02-13

## 2025-02-13 PROBLEM — R80.9 MICROALBUMINURIA DUE TO TYPE 2 DIABETES MELLITUS (HCC): Status: ACTIVE | Noted: 2025-02-13

## 2025-02-13 PROBLEM — E11.59 HYPERTENSION ASSOCIATED WITH TYPE 2 DIABETES MELLITUS (HCC): Status: ACTIVE | Noted: 2025-02-13

## 2025-02-13 PROBLEM — E11.29 MICROALBUMINURIA DUE TO TYPE 2 DIABETES MELLITUS (HCC): Status: ACTIVE | Noted: 2025-02-13

## 2025-02-13 PROBLEM — R74.8 ELEVATED ALKALINE PHOSPHATASE LEVEL: Status: ACTIVE | Noted: 2025-02-13

## 2025-02-13 PROBLEM — E11.59 TYPE 2 DIABETES MELLITUS WITH OTHER CIRCULATORY COMPLICATION, WITHOUT LONG-TERM CURRENT USE OF INSULIN (HCC): Status: ACTIVE | Noted: 2025-02-13

## 2025-02-13 PROBLEM — I15.2 HYPERTENSION ASSOCIATED WITH TYPE 2 DIABETES MELLITUS (HCC): Status: ACTIVE | Noted: 2025-02-13

## 2025-02-13 PROCEDURE — 99213 OFFICE O/P EST LOW 20 MIN: CPT | Performed by: PHARMACIST

## 2025-02-13 PROCEDURE — RXMED WILLOW AMBULATORY MEDICATION CHARGE: Performed by: INTERNAL MEDICINE

## 2025-02-13 RX ORDER — ZOSTER VACCINE RECOMBINANT, ADJUVANTED 50 MCG/0.5
KIT INTRAMUSCULAR
Qty: 1 ML | Refills: 0 | OUTPATIENT
Start: 2025-02-13

## 2025-02-13 RX ORDER — COVID-19 VACCINE, MRNA 0.04 MG/.418ML
INJECTION, SUSPENSION INTRAMUSCULAR
Qty: 0.3 ML | Refills: 0 | OUTPATIENT
Start: 2025-02-13

## 2025-02-13 RX ORDER — INSULIN LISPRO 100 [IU]/ML
INJECTION, SOLUTION INTRAVENOUS; SUBCUTANEOUS
Qty: 12 ML | Refills: 5 | Status: SHIPPED | OUTPATIENT
Start: 2025-02-13

## 2025-02-13 RX ORDER — ACETAMINOPHEN 500 MG
500-1000 TABLET ORAL EVERY 6 HOURS PRN
Qty: 60 TABLET | Refills: 0 | Status: CANCELLED | OUTPATIENT
Start: 2025-02-13

## 2025-02-13 NOTE — PROGRESS NOTES
New Patient Consult Note  Primary care physician: DELMIS Gutierrez  8:50-9:35am   Reason for consult: Management of Uncontrolled Type 2 Diabetes    HPI:  Divya Damon is a 54 y.o. old patient who comes in today for evaluation of above stated problem.    Pt says his ozempic and insulin pen injections sometimes has leakage on the pen needle. Dicussed that he may not be screwing it in tight enough, but I also made sure to discuss proper injection technique. He is experience reduced appetite with the ozempic though and tolerating fine, so he knows he is getting some of the medicine. .      He continues to try to slowly smoke less. He says smoking is down 8-10 cigarettes a day on average. He is still snacking around afternoon (between lunch and dinner) and does not correct for this. His BG is high in the evenings. He is otherwise compliant with his insulin injections.     Pt denies any hypoglycemia.      I went over time in range and how to read his dexcom graphs.         Most Recent HbA1c:   Lab Results   Component Value Date/Time    HBA1C 12.3 (H) 01/07/2025 07:54 AM    HBA1C 7.0 (A) 03/28/2023 09:22 AM    HBA1C 6.4 (A) 10/14/2022 04:50 PM               Current Diabetes Regimen:  GLP-1 Agent: ozempic 0.5mg weekly   Metformin + SGLT-2 Inhibitor: Dapagiflozin 5/1000 BID   Basal Insulin: Lantus 16 units qhs   Prandial Insulin: Lispro 6-8 units on average with meals   4 units for meal + sliding scale                  Hypoglycemia:  None      ROS:  Constitutional: No weight loss  Cardiac: No palpitations or racing heart  Resp: No shortness of breath  Neuro: No numbness or tinging in feet  Endo: No heat or cold intolerance, no polyuria or polydipsia  All other systems were reviewed and were negative.    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    Familial hypertriglyceridemia 01/09/2025    Pseudohyponatremia 01/09/2025    Encounter to  establish care 10/17/2022    Other hyperlipidemia 06/07/2022    Paroxysmal atrial fibrillation (HCC) 06/07/2022    Type 2 diabetes mellitus without complication, without long-term current use of insulin (HCC) 06/07/2022    Chronic idiopathic gout of foot 06/07/2022       Past Surgical History:  Past Surgical History:   Procedure Laterality Date    LYMPH NODE EXCISION         Allergies:  Pcn [penicillins] and Trazodone    Social History:  Social History     Socioeconomic History    Marital status: Single     Spouse name: Not on file    Number of children: Not on file    Years of education: Not on file    Highest education level: Not on file   Occupational History    Not on file   Tobacco Use    Smoking status: Every Day     Current packs/day: 0.50     Average packs/day: 0.5 packs/day for 40.1 years (20.1 ttl pk-yrs)     Types: Cigarettes     Start date: 1985    Smokeless tobacco: Never    Tobacco comments:     Patient is working on reducing cigarette use. He cut back from 15 cigarettes per day to 8-9 cigarettes per day.   Vaping Use    Vaping status: Never Used   Substance and Sexual Activity    Alcohol use: Not Currently     Comment: occ    Drug use: Yes     Types: Methamphetamines     Comment: patient quit meth 09/09/22    Sexual activity: Yes     Partners: Male     Birth control/protection: Condom   Other Topics Concern    Not on file   Social History Narrative    Not on file     Social Drivers of Health     Financial Resource Strain: Not on file   Food Insecurity: Not on file   Transportation Needs: Not on file   Physical Activity: Not on file   Stress: Not on file   Social Connections: Not on file   Intimate Partner Violence: Not on file   Housing Stability: Not on file       Family History:  Family History   Problem Relation Age of Onset    Cancer Mother         Lung    Alzheimer's Disease Mother     Psychiatric Illness Father         PTSD    Stroke Father     Cancer Father         throat    Lung Disease Sister      Cancer Sister         Lung, bones, brain    Diabetes Sister         5 sister T1DM    Psychiatric Illness Brother         PTSD    Diabetes Brother         5 siblings are T2DM    Familial Hypercholesterolemia Neg Hx        Medications:    Current Outpatient Medications:     insulin lispro 100 UNIT/ML SC SOPN injection PEN, Use 5-6 units before meals. Then add per SS. 150-200- 1 unit, 200-250 - 2 units , 250-300 - 3 units, 300-350 - 4 units, 350-400 - 5 units, 400+ 6 units . Max 40 units a day, Disp: 12 mL, Rfl: 5    telmisartan (MICARDIS) 40 MG Tab, Take 1 Tablet by mouth every day. to lower blood pressure, Disp: 100 Tablet, Rfl: 3    omega-3 acid ethyl esters (LOVAZA) 1 GM capsule, Take 2 Capsules by mouth 2 times a day. To lower triglycerides, Disp: 360 Capsule, Rfl: 3    clindamycin (CLEOCIN) 300 MG Cap, take 1 capsule by mouth 2 times daily until gone, Disp: 20 Capsule, Rfl: 0    chlorhexidine (PERIDEX) 0.12 % Solution, swish for 30 seconds and spit 15 ml by mouth in the morning and evening after tooth brushing, Disp: 473 mL, Rfl: 0    insulin glargine 100 UNIT/ML SC SOPN injection, Inject 16 units subq daily or as directed (max 50 units/day), Disp: 15 mL, Rfl: 5    Dapagliflozin Pro-metFORMIN ER 5-1000 MG TABLET SR 24 HR, Take 1 tablet by mouth 2 times a day, Disp: 180 Tablet, Rfl: 5    Continuous Glucose Sensor (DEXCOM G7 SENSOR) Misc, Use changing every 10 days, Disp: 3 Each, Rfl: 5    Semaglutide,0.25 or 0.5MG/DOS, (OZEMPIC, 0.25 OR 0.5 MG/DOSE,) 2 MG/3ML Solution Pen-injector, Inject 0.25 mg under the skin every 7 days. Increase to 0.5mg weekly when tolerated., Disp: 3 mL, Rfl: 11    Choline Fenofibrate (FENOFIBRIC ACID) 135 MG CAPSULE DELAYED RELEASE, Take 1 Capsule by mouth every day. To lower triglycerides, Disp: 100 Capsule, Rfl: 3    Insulin Pen Needle, Use with insulin pen as directed, Disp: 100 Each, Rfl: 5    atorvastatin (LIPITOR) 40 MG Tab, Take 1 Tablet by mouth every evening., Disp: 100  "Tablet, Rfl: 3    glucose blood strip, Test blood sugar as recommended by provider.  blood glucose monitoring kit., Disp: 300 Strip, Rfl: 11    Blood Glucose Monitoring Suppl (TRUE METRIX METER) w/Device Kit, Use one strip to test blood sugar three times daily., Disp: 1 Kit, Rfl: 11    TechLite AST Lancets Misc, Use one lancet to test blood sugar three times daily., Disp: 300 Each, Rfl: 11    Alcohol Swabs, Wipe site with prep pad prior to injection., Disp: 300 Each, Rfl: 11    allopurinol (ZYLOPRIM) 300 MG Tab, Take 1 Tablet by mouth 2 times a day., Disp: 30 Tablet, Rfl: 4    methylPREDNISolone (MEDROL DOSEPAK) 4 MG Tablet Therapy Pack, Use as directed, Disp: 21 Each, Rfl: 0    doxepin (SINEQUAN) 10 MG Cap, Take 1 Capsule by mouth every evening., Disp: 30 Capsule, Rfl: 1    ibuprofen (MOTRIN) 600 MG Tab, Take 1 Tablet by mouth every 6 hours as needed for Headache, Inflammation or Moderate Pain., Disp: 90 Tablet, Rfl: 1    acetaminophen (TYLENOL) 500 MG Tab, Take 1-2 Tablets by mouth every 6 hours as needed for Moderate Pain or Mild Pain., Disp: 30 Tablet, Rfl: 2    Labs: Reviewed    Physical Examination:  Vital signs: /71   Pulse 83   Ht 1.676 m (5' 6\")   Wt 83.7 kg (184 lb 8 oz)   BMI 29.78 kg/m²  Body mass index is 29.78 kg/m².  General: No apparent distress, cooperative  Eyes: No scleral icterus or discharge  ENMT: Normal on external inspection of nose, lips, normal thyroid exam  Neck: No abnormal masses on inspection  Resp: Normal effort, clear to auscultation bilaterally   CVS: Regular rate and rhythm, S1 S2 normal, no murmur   Extremities: No edema  Abdomen: abdominal obesity present  Neuro: Alert and oriented  Skin: No rash  Psych: Normal mood and affect, intact memory and able to make informed decisions    Plan:    There are no diagnoses linked to this encounter.    Return in about 2 weeks (around 2/27/2025).    Dm - patients time in range improved since last visit and is actually at 47% now with " no lows. Can continue to titrate insulin doses up, but also because of his snacking he tends to be higher for dinner. Will acount for this with lispro use.   - Increase to 19 units lantus   - Increase Humalog to 5 units with Breakfast /lunch + SS and Dinner 6 units + SS (CF of 1:50)   - Discussed vaccines as well: rec he get pneumonia, shingles (zoster), and maintenance flu/covid vaccine regularly         Thank you for allowing me to participate in the care of this patient.    Jesus Khanna, PharmD    02/13/25    CC:   Jael Morejon, A.P.RAlvaN.

## 2025-02-13 NOTE — TELEPHONE ENCOUNTER
Submitted PA for Telmisartan 40MG tablets  Divya Damon (Key: [id number])  PA Case ID #: [id number]    Pending response      Submitted PA for Omega-3-acid Ethyl Esters 1GM capsules  Divya Damon (Gonzalez: [id number])  PA Case ID #: [id number]    Divya Damon (Gonzalez: [id number]) - [id number]  Omega-3-acid Ethyl Esters 1GM capsules  status: PA Response - ApprovedCreated: February 13th, 2025Sent: February 13th, 2025    Lilliana Gibbs [facility]  [facility]  Ph. [phone]  Fx. [phone]

## 2025-02-14 ENCOUNTER — PHARMACY VISIT (OUTPATIENT)
Dept: PHARMACY | Facility: MEDICAL CENTER | Age: 55
End: 2025-02-14
Payer: COMMERCIAL

## 2025-02-14 PROCEDURE — RXMED WILLOW AMBULATORY MEDICATION CHARGE: Performed by: FAMILY MEDICINE

## 2025-02-14 RX ORDER — VALSARTAN 160 MG/1
160 TABLET ORAL DAILY
Qty: 100 TABLET | Refills: 3 | Status: SHIPPED | OUTPATIENT
Start: 2025-02-14

## 2025-02-14 NOTE — TELEPHONE ENCOUNTER
PA for Telmisartan 40MG tablets   Received response: Denied    Scanned to media      Insurance requesting 2 preferred agents that have been inadequate/intolerance. Only losartan was on patient list.      Please advise.    Lilliana Gibbs, Med Ass't  [facility]  Ph. [phone]  Fx. [phone]

## 2025-02-14 NOTE — TELEPHONE ENCOUNTER
Called and s/w patient. Notified that insurance denied Telmisartan, and Dr Abbott put in a new rx for Valsartan.  Omega 3FA (lovaza) has been approved.     Alexis Abbott M.D.  Lilliana Gibbs, Med Ass't  Cc: Netta Hall, PharmD; Michael J Bloch, M.D.  Caller: Unspecified (2 days ago, 10:53 AM)  I'll switch him to valsartan  Please continue with the PA for generic omega 3 FA (brand Lovaza) but does not need to be brand name.  Let me know if any questions.  Thanks

## 2025-02-25 DIAGNOSIS — M1A.0790 IDIOPATHIC CHRONIC GOUT OF FOOT WITHOUT TOPHUS, UNSPECIFIED LATERALITY: ICD-10-CM

## 2025-02-25 PROCEDURE — RXMED WILLOW AMBULATORY MEDICATION CHARGE: Performed by: FAMILY MEDICINE

## 2025-02-25 PROCEDURE — RXMED WILLOW AMBULATORY MEDICATION CHARGE: Performed by: NURSE PRACTITIONER

## 2025-02-25 PROCEDURE — RXMED WILLOW AMBULATORY MEDICATION CHARGE: Performed by: INTERNAL MEDICINE

## 2025-02-25 NOTE — TELEPHONE ENCOUNTER
Received request via: Pharmacy    Was the patient seen in the last year in this department? Yes    Does the patient have an active prescription (recently filled or refills available) for medication(s) requested? No    [facility]    Does the patient have detention Plus and need 100-day supply? (This applies to ALL medications) Patient does not have SCP

## 2025-02-26 ENCOUNTER — PHARMACY VISIT (OUTPATIENT)
Dept: PHARMACY | Facility: MEDICAL CENTER | Age: 55
End: 2025-02-26
Payer: COMMERCIAL

## 2025-02-26 PROCEDURE — RXMED WILLOW AMBULATORY MEDICATION CHARGE: Performed by: DENTIST

## 2025-02-27 ENCOUNTER — TELEPHONE (OUTPATIENT)
Dept: MEDICAL GROUP | Facility: MEDICAL CENTER | Age: 55
End: 2025-02-27

## 2025-02-27 ENCOUNTER — NON-PROVIDER VISIT (OUTPATIENT)
Dept: MEDICAL GROUP | Facility: MEDICAL CENTER | Age: 55
End: 2025-02-27
Attending: NURSE PRACTITIONER
Payer: MEDICAID

## 2025-02-27 VITALS
WEIGHT: 183.2 LBS | BODY MASS INDEX: 29.44 KG/M2 | HEART RATE: 91 BPM | HEIGHT: 66 IN | DIASTOLIC BLOOD PRESSURE: 68 MMHG | SYSTOLIC BLOOD PRESSURE: 117 MMHG

## 2025-02-27 PROCEDURE — 99213 OFFICE O/P EST LOW 20 MIN: CPT | Performed by: PHARMACIST

## 2025-02-27 NOTE — NON-PROVIDER
"New Patient Consult Note  Primary care physician: DELMIS Gutierrez  12:50-1:35pm  Reason for consult: Management of Uncontrolled Type 2 Diabetes    HPI:  Divya Damon is a 54 y.o. old patient who comes in today for evaluation of above stated problem.    Pt brought his insulin ozempic and pen needles and I discovered they he was not removing the second needle of the pen needles which is why he was getting \"leaking\" in the cap. He basically has not been having any insulin or ozempic being injected. Today I counseled him on injection with his actual insulin and he successfully injected 10 u of lantus and 0.25mg of ozempic.     Patient was educated on how to identify and treat hypoglycemia if indicated (BG <70). Discussed the 15:15 rule.       Most Recent HbA1c:   Lab Results   Component Value Date/Time    HBA1C 12.3 (H) 01/07/2025 07:54 AM    HBA1C 7.0 (A) 03/28/2023 09:22 AM    HBA1C 6.4 (A) 10/14/2022 04:50 PM           Current Diabetes Regimen:  GLP-1 Agent: ozempic (Was not getting)  Metformin + SGLT-2 Inhibitor: Dapagiflozin 5/1000 BID   Basal Insulin: Lantus (was not getting)   Prandial Insulin: Lispro  (Was not getting)       Cgm     ROS:  Constitutional: No weight loss  Cardiac: No palpitations or racing heart  Resp: No shortness of breath  Neuro: No numbness or tinging in feet  Endo: No heat or cold intolerance, no polyuria or polydipsia  All other systems were reviewed and were negative.    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    Familial hypertriglyceridemia " 01/09/2025    Pseudohyponatremia 01/09/2025    Encounter to establish care 10/17/2022    Other hyperlipidemia 06/07/2022    Paroxysmal atrial fibrillation (HCC) 06/07/2022    Type 2 diabetes mellitus without complication, without long-term current use of insulin (HCC) 06/07/2022    Chronic idiopathic gout of foot 06/07/2022       Past Surgical History:  Past Surgical History:   Procedure Laterality Date    LYMPH NODE EXCISION         Allergies:  Pcn [penicillins] and Trazodone    Social History:  Social History     Socioeconomic History    Marital status: Single     Spouse name: Not on file    Number of children: Not on file    Years of education: Not on file    Highest education level: Not on file   Occupational History    Not on file   Tobacco Use    Smoking status: Every Day     Current packs/day: 0.50     Average packs/day: 0.5 packs/day for 40.2 years (20.1 ttl pk-yrs)     Types: Cigarettes     Start date: 1985    Smokeless tobacco: Never    Tobacco comments:     Patient is working on reducing cigarette use. He cut back from 15 cigarettes per day to 8-9 cigarettes per day.   Vaping Use    Vaping status: Never Used   Substance and Sexual Activity    Alcohol use: Not Currently     Comment: occ    Drug use: Yes     Types: Methamphetamines     Comment: patient quit meth 09/09/22    Sexual activity: Yes     Partners: Male     Birth control/protection: Condom   Other Topics Concern    Not on file   Social History Narrative    Not on file     Social Drivers of Health     Financial Resource Strain: Not on file   Food Insecurity: Not on file   Transportation Needs: Not on file   Physical Activity: Not on file   Stress: Not on file   Social Connections: Not on file   Intimate Partner Violence: Not on file   Housing Stability: Not on file       Family History:  Family History   Problem Relation Age of Onset    Cancer Mother         Lung    Alzheimer's Disease Mother     Psychiatric Illness Father         PTSD    Stroke  Father     Cancer Father         throat    Lung Disease Sister     Cancer Sister         Lung, bones, brain    Diabetes Sister         5 sister T1DM    Psychiatric Illness Brother         PTSD    Diabetes Brother         5 siblings are T2DM    Familial Hypercholesterolemia Neg Hx        Medications:    Current Outpatient Medications:     ibuprofen (MOTRIN) 600 MG Tab, Take 1 tablet by mouth every 6 to 8 hours as needed for pain, Disp: 40 Tablet, Rfl: 0    chlorhexidine (PERIDEX) 0.12 % Solution, Swish and spit 15 ml twice daily rinsing for 30 seconds morning and evening after toothbrushing, Disp: 473 mL, Rfl: 3    valsartan (DIOVAN) 160 MG Tab, Take 1 Tablet by mouth every day. To lower blood pressure, Disp: 100 Tablet, Rfl: 3    insulin lispro 100 UNIT/ML SC SOPN injection PEN, Use 5-6 units before meals. Then add per sliding scale. 150-200- 1 unit, 200-250 - 2 units , 250-300 - 3 units, 300-350 - 4 units, 350-400 - 5 units, 400+ 6 units . Max 40 units a day, Disp: 12 mL, Rfl: 5    Zoster Vac Recomb Adjuvanted (SHINGRIX) 50 MCG/0.5ML Recon Susp, Inject  into the shoulder, thigh, or buttocks., Disp: 1 mL, Rfl: 0    COVID-19 mRNA Vac-Randi,Pfizer, (COMIRNATY) 30 MCG/0.3ML Suspension Prefilled Syringe injection, Inject  into the shoulder, thigh, or buttocks., Disp: 0.3 mL, Rfl: 0    omega-3 acid ethyl esters (LOVAZA) 1 GM capsule, Take 2 Capsules by mouth 2 times a day. To lower triglycerides, Disp: 360 Capsule, Rfl: 3    clindamycin (CLEOCIN) 300 MG Cap, take 1 capsule by mouth 2 times daily until gone, Disp: 20 Capsule, Rfl: 0    insulin glargine 100 UNIT/ML SC SOPN injection, Inject 16 units subq daily or as directed (max 50 units/day), Disp: 15 mL, Rfl: 5    Dapagliflozin Pro-metFORMIN ER 5-1000 MG TABLET SR 24 HR, Take 1 tablet by mouth 2 times a day, Disp: 180 Tablet, Rfl: 5    Continuous Glucose Sensor (DEXCOM G7 SENSOR) Misc, Use changing every 10 days, Disp: 3 Each, Rfl: 5    Semaglutide,0.25 or 0.5MG/DOS,  "(OZEMPIC, 0.25 OR 0.5 MG/DOSE,) 2 MG/3ML Solution Pen-injector, Inject 0.25 mg under the skin every 7 days. Increase to 0.5mg weekly when tolerated., Disp: 3 mL, Rfl: 11    Choline Fenofibrate (FENOFIBRIC ACID) 135 MG CAPSULE DELAYED RELEASE, Take 1 Capsule by mouth every day. To lower triglycerides, Disp: 100 Capsule, Rfl: 3    Insulin Pen Needle, Use with insulin pen as directed, Disp: 100 Each, Rfl: 5    atorvastatin (LIPITOR) 40 MG Tab, Take 1 Tablet by mouth every evening., Disp: 100 Tablet, Rfl: 3    glucose blood strip, Test blood sugar as recommended by provider.  blood glucose monitoring kit., Disp: 300 Strip, Rfl: 11    Blood Glucose Monitoring Suppl (TRUE METRIX METER) w/Device Kit, Use one strip to test blood sugar three times daily., Disp: 1 Kit, Rfl: 11    TechLite AST Lancets Misc, Use one lancet to test blood sugar three times daily., Disp: 300 Each, Rfl: 11    Alcohol Swabs, Wipe site with prep pad prior to injection., Disp: 300 Each, Rfl: 11    allopurinol (ZYLOPRIM) 300 MG Tab, Take 1 Tablet by mouth 2 times a day., Disp: 30 Tablet, Rfl: 4    methylPREDNISolone (MEDROL DOSEPAK) 4 MG Tablet Therapy Pack, Use as directed, Disp: 21 Each, Rfl: 0    doxepin (SINEQUAN) 10 MG Cap, Take 1 Capsule by mouth every evening., Disp: 30 Capsule, Rfl: 1    ibuprofen (MOTRIN) 600 MG Tab, Take 1 Tablet by mouth every 6 hours as needed for Headache, Inflammation or Moderate Pain., Disp: 90 Tablet, Rfl: 1    acetaminophen (TYLENOL) 500 MG Tab, Take 1-2 Tablets by mouth every 6 hours as needed for Moderate Pain or Mild Pain., Disp: 30 Tablet, Rfl: 2    Labs: Reviewed    Physical Examination:  Vital signs: /68   Pulse 91   Ht 1.676 m (5' 6\")   Wt 83.1 kg (183 lb 3.2 oz)   BMI 29.57 kg/m²  Body mass index is 29.57 kg/m².  General: No apparent distress, cooperative  Eyes: No scleral icterus or discharge  ENMT: Normal on external inspection of nose, lips, normal thyroid exam  Neck: No abnormal masses on " inspection  Resp: Normal effort, clear to auscultation bilaterally   CVS: Regular rate and rhythm, S1 S2 normal, no murmur   Extremities: No edema  Abdomen: abdominal obesity present  Neuro: Alert and oriented  Skin: No rash  Psych: Normal mood and affect, intact memory and able to make informed decisions    Plan:    There are no diagnoses linked to this encounter.    Return in about 1 week (around 3/6/2025).    DM - Despite changes made last visit no changes observed in his CGM. This is obviously due to him not injecting the insulin or ozempic. Restarting with him as an insulin and ozempic naive patient; will re dose. The good news is he's been able to lower his BG through diet and the Xigduo since the first visit without the use of ozempic and lantus.  He remains to have some very highs during the day.   - Lantus 10 units qhs  - ozempic 0.25mg weekly  - dapagiflozin/metformin XR  max dose BID  - call if hypoglycemia occurs, pt to return in 1 week with BG numbers     Thank you for allowing me to participate in the care of this patient.    Jesus Khanna, PharmD      02/27/25    CC:   Jael Morejon, A.P.RAlvaNAlva

## 2025-02-27 NOTE — TELEPHONE ENCOUNTER
Patient walked in and is requesting refill on Medrol Dosepak for gout. He stated his last prescription doesn't have a refill. Notified patient Jael is out of the office today, but a message would be sent with his request.

## 2025-02-28 PROCEDURE — RXMED WILLOW AMBULATORY MEDICATION CHARGE: Performed by: NURSE PRACTITIONER

## 2025-02-28 RX ORDER — ACETAMINOPHEN 500 MG
500-1000 TABLET ORAL EVERY 6 HOURS PRN
Qty: 60 TABLET | Refills: 0 | Status: SHIPPED | OUTPATIENT
Start: 2025-02-28

## 2025-03-01 ENCOUNTER — PHARMACY VISIT (OUTPATIENT)
Dept: PHARMACY | Facility: MEDICAL CENTER | Age: 55
End: 2025-03-01
Payer: COMMERCIAL

## 2025-03-01 DIAGNOSIS — M10.9 ACUTE GOUT OF LEFT ANKLE, UNSPECIFIED CAUSE: ICD-10-CM

## 2025-03-01 RX ORDER — METHYLPREDNISOLONE 4 MG/1
TABLET ORAL
Qty: 21 EACH | Refills: 0 | Status: CANCELLED | OUTPATIENT
Start: 2025-03-01

## 2025-03-03 DIAGNOSIS — M10.9 ACUTE GOUT OF LEFT ANKLE, UNSPECIFIED CAUSE: ICD-10-CM

## 2025-03-03 NOTE — TELEPHONE ENCOUNTER
Received request via: Pharmacy    Was the patient seen in the last year in this department? Yes    Does the patient have an active prescription (recently filled or refills available) for medication(s) requested? No    [facility]    Does the patient have shelter Plus and need 100-day supply? (This applies to ALL medications) Patient does not have SCP

## 2025-03-04 ENCOUNTER — PHARMACY VISIT (OUTPATIENT)
Dept: PHARMACY | Facility: MEDICAL CENTER | Age: 55
End: 2025-03-04
Payer: COMMERCIAL

## 2025-03-04 PROCEDURE — RXMED WILLOW AMBULATORY MEDICATION CHARGE: Performed by: NURSE PRACTITIONER

## 2025-03-04 PROCEDURE — RXMED WILLOW AMBULATORY MEDICATION CHARGE: Performed by: FAMILY MEDICINE

## 2025-03-05 ENCOUNTER — NON-PROVIDER VISIT (OUTPATIENT)
Dept: MEDICAL GROUP | Facility: MEDICAL CENTER | Age: 55
End: 2025-03-05
Attending: NURSE PRACTITIONER
Payer: MEDICAID

## 2025-03-05 VITALS — WEIGHT: 184.4 LBS | HEIGHT: 66 IN | BODY MASS INDEX: 29.63 KG/M2

## 2025-03-05 DIAGNOSIS — E11.9 TYPE 2 DIABETES MELLITUS WITHOUT COMPLICATION, WITHOUT LONG-TERM CURRENT USE OF INSULIN (HCC): ICD-10-CM

## 2025-03-05 PROCEDURE — 99213 OFFICE O/P EST LOW 20 MIN: CPT | Performed by: PHARMACIST

## 2025-03-05 NOTE — PROGRESS NOTES
New Patient Consult Note  Primary care physician: DELMIS Gutierrez  1:15-2:00pm  Reason for consult: Management of Uncontrolled Type 2 Diabetes    HPI:  Divya Damon is a 54 y.o. old patient who comes in today for evaluation of above stated problem.    Pt is injecting the medication properly now and is tolerating the ozempic after 1 dose but doesn't feel much appetite suppression. He does complain of some diarrhea but also thinks its from his fish oil.     He discussed his other dietary changes today.   For example he switched his white rice to brown rice and tries to portion it as discussed last visit. He tries to stay away from candy or sweets not too which he previously ate a lot of. He eats mostly Swiss food which are often rice and vegetable/meat dishes, or occasionally sweet spaghetti/noodles. His teeth was pulled so this past week he's had to eat softer foods like peanut butter sandwiches for snacks. He is trying to eat more salads as well, but has difficulty chewing due to his teeth right now.     Most Recent HbA1c:   Lab Results   Component Value Date/Time    HBA1C 12.3 (H) 01/07/2025 07:54 AM    HBA1C 7.0 (A) 03/28/2023 09:22 AM    HBA1C 6.4 (A) 10/14/2022 04:50 PM       Current Diabetes Regimen:  GLP-1 Agent: Ozempic 0.25mg weekly    SGLT-2 Inhibitor:    Metformin + SGLT-2 Inhibitor: Dapagiflozin 5/1000mg BID    Basal Insulin: Lantus 10 units qhs   Prandial Insulin: Lispro  Not using yet     CGM     -  Hypoglycemia:  None      ROS:  Constitutional: No weight loss  Cardiac: No palpitations or racing heart  Resp: No shortness of breath  Neuro: No numbness or tinging in feet  Endo: No heat or cold intolerance, no polyuria or polydipsia  All other systems were reviewed and were negative.    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    Familial hypertriglyceridemia 01/09/2025    Pseudohyponatremia 01/09/2025    Encounter to establish care 10/17/2022    Other hyperlipidemia 06/07/2022    Paroxysmal atrial fibrillation (HCC) 06/07/2022    Type 2 diabetes mellitus without complication, without long-term current use of insulin (HCC) 06/07/2022    Chronic idiopathic gout of foot 06/07/2022       Past Surgical History:  Past Surgical History:   Procedure Laterality Date    LYMPH NODE EXCISION         Allergies:  Pcn [penicillins] and Trazodone    Social History:  Social History     Socioeconomic History    Marital status: Single     Spouse name: Not on file    Number of children: Not on file    Years of education: Not on file    Highest education level: Not on file   Occupational History    Not on file   Tobacco Use    Smoking status: Every Day     Current packs/day: 0.50     Average packs/day: 0.5 packs/day for 40.2 years (20.1 ttl pk-yrs)     Types: Cigarettes     Start date: 1985    Smokeless tobacco: Never    Tobacco comments:     Patient is working on reducing cigarette use. He cut back from 15 cigarettes per day to 8-9 cigarettes per day.   Vaping Use    Vaping status: Never Used   Substance and Sexual Activity    Alcohol use: Not Currently     Comment: occ    Drug use: Yes     Types: Methamphetamines     Comment: patient quit meth 09/09/22    Sexual activity: Yes     Partners: Male     Birth control/protection: Condom   Other Topics Concern    Not on file   Social History Narrative    Not on file     Social Drivers of Health     Financial Resource Strain: Not on file   Food Insecurity: Not on file   Transportation Needs: Not on file   Physical Activity: Not on file    Stress: Not on file   Social Connections: Not on file   Intimate Partner Violence: Not on file   Housing Stability: Not on file       Family History:  Family History   Problem Relation Age of Onset    Cancer Mother         Lung    Alzheimer's Disease Mother     Psychiatric Illness Father         PTSD    Stroke Father     Cancer Father         throat    Lung Disease Sister     Cancer Sister         Lung, bones, brain    Diabetes Sister         5 sister T1DM    Psychiatric Illness Brother         PTSD    Diabetes Brother         5 siblings are T2DM    Familial Hypercholesterolemia Neg Hx        Medications:    Current Outpatient Medications:     acetaminophen (TYLENOL) 500 MG Tab, Take 1-2 Tablets by mouth every 6 hours as needed for Moderate Pain., Disp: 60 Tablet, Rfl: 0    ibuprofen (MOTRIN) 600 MG Tab, Take 1 tablet by mouth every 6 to 8 hours as needed for pain, Disp: 40 Tablet, Rfl: 0    chlorhexidine (PERIDEX) 0.12 % Solution, Swish and spit 15 ml twice daily rinsing for 30 seconds morning and evening after toothbrushing, Disp: 473 mL, Rfl: 3    valsartan (DIOVAN) 160 MG Tab, Take 1 Tablet by mouth every day. To lower blood pressure, Disp: 100 Tablet, Rfl: 3    insulin lispro 100 UNIT/ML SC SOPN injection PEN, Use 5-6 units before meals. Then add per sliding scale. 150-200- 1 unit, 200-250 - 2 units , 250-300 - 3 units, 300-350 - 4 units, 350-400 - 5 units, 400+ 6 units . Max 40 units a day, Disp: 12 mL, Rfl: 5    Zoster Vac Recomb Adjuvanted (SHINGRIX) 50 MCG/0.5ML Recon Susp, Inject  into the shoulder, thigh, or buttocks., Disp: 1 mL, Rfl: 0    COVID-19 mRNA Vac-Randi,Pfizer, (COMIRNATY) 30 MCG/0.3ML Suspension Prefilled Syringe injection, Inject  into the shoulder, thigh, or buttocks., Disp: 0.3 mL, Rfl: 0    omega-3 acid ethyl esters (LOVAZA) 1 GM capsule, Take 2 Capsules by mouth 2 times a day. To lower triglycerides, Disp: 360 Capsule, Rfl: 3    clindamycin (CLEOCIN) 300 MG Cap, take 1 capsule by mouth 2  "times daily until gone, Disp: 20 Capsule, Rfl: 0    insulin glargine 100 UNIT/ML SC SOPN injection, Inject 16 units subq daily or as directed (max 50 units/day), Disp: 15 mL, Rfl: 5    Dapagliflozin Pro-metFORMIN ER 5-1000 MG TABLET SR 24 HR, Take 1 tablet by mouth 2 times a day, Disp: 180 Tablet, Rfl: 5    Continuous Glucose Sensor (DEXCOM G7 SENSOR) Misc, Use changing every 10 days, Disp: 3 Each, Rfl: 5    Semaglutide,0.25 or 0.5MG/DOS, (OZEMPIC, 0.25 OR 0.5 MG/DOSE,) 2 MG/3ML Solution Pen-injector, Inject 0.25 mg under the skin every 7 days. Increase to 0.5mg weekly when tolerated., Disp: 3 mL, Rfl: 11    Choline Fenofibrate (FENOFIBRIC ACID) 135 MG CAPSULE DELAYED RELEASE, Take 1 Capsule by mouth every day. To lower triglycerides, Disp: 100 Capsule, Rfl: 3    Insulin Pen Needle, Use with insulin pen as directed, Disp: 100 Each, Rfl: 5    atorvastatin (LIPITOR) 40 MG Tab, Take 1 Tablet by mouth every evening., Disp: 100 Tablet, Rfl: 3    glucose blood strip, Test blood sugar as recommended by provider.  blood glucose monitoring kit., Disp: 300 Strip, Rfl: 11    Blood Glucose Monitoring Suppl (TRUE METRIX METER) w/Device Kit, Use one strip to test blood sugar three times daily., Disp: 1 Kit, Rfl: 11    TechLite AST Lancets Misc, Use one lancet to test blood sugar three times daily., Disp: 300 Each, Rfl: 11    Alcohol Swabs, Wipe site with prep pad prior to injection., Disp: 300 Each, Rfl: 11    allopurinol (ZYLOPRIM) 300 MG Tab, Take 1 Tablet by mouth 2 times a day., Disp: 30 Tablet, Rfl: 4    methylPREDNISolone (MEDROL DOSEPAK) 4 MG Tablet Therapy Pack, Use as directed, Disp: 21 Each, Rfl: 0    doxepin (SINEQUAN) 10 MG Cap, Take 1 Capsule by mouth every evening., Disp: 30 Capsule, Rfl: 1    ibuprofen (MOTRIN) 600 MG Tab, Take 1 Tablet by mouth every 6 hours as needed for Headache, Inflammation or Moderate Pain., Disp: 90 Tablet, Rfl: 1    Labs: Reviewed    Physical Examination:  Vital signs: Ht 1.676 m (5' 6\")   " Wt 83.6 kg (184 lb 6.4 oz)   BMI 29.76 kg/m²  Body mass index is 29.76 kg/m².  General: No apparent distress, cooperative  Eyes: No scleral icterus or discharge  ENMT: Normal on external inspection of nose, lips, normal thyroid exam  Neck: No abnormal masses on inspection  Resp: Normal effort, clear to auscultation bilaterally   CVS: Regular rate and rhythm, S1 S2 normal, no murmur   Extremities: No edema  Abdomen: abdominal obesity present  Neuro: Alert and oriented  Skin: No rash  Psych: Normal mood and affect, intact memory and able to make informed decisions    Plan:    There are no diagnoses linked to this encounter.    Return in about 2 weeks (around 3/19/2025).    DM - Patients time in range for the last week since adding lantus 10 units and ozempic to his regimen has shown a decrease in his average BG and increased time in range, but still not at goal. Based on his daily graphs, he Is headed towards goal, but it seems his lunch/some dinners will still lead to a high that is not controlled. Discussed short acting insulin, especially if the meal he eats is heavier on carbs such as rice, pasta, or breads.   - Humalog 3 units with the carb containing meals (4 units if heavier on carbs)  - Increase ozempic to 0.5mg weekly when tolerated  - Continue lantus 10u, dapagiflozin/metformin            Thank you for allowing me to participate in the care of this patient.    Jesus Khanna, PharmD   03/05/25    CC:   Jael Morejon, [email or web address].[email or web address]

## 2025-03-07 RX ORDER — METHYLPREDNISOLONE 4 MG/1
TABLET ORAL
Qty: 21 EACH | Refills: 0 | OUTPATIENT
Start: 2025-03-07

## 2025-03-10 DIAGNOSIS — E11.9 TYPE 2 DIABETES MELLITUS WITHOUT COMPLICATION, WITHOUT LONG-TERM CURRENT USE OF INSULIN (HCC): ICD-10-CM

## 2025-03-10 PROCEDURE — RXMED WILLOW AMBULATORY MEDICATION CHARGE: Performed by: NURSE PRACTITIONER

## 2025-03-10 PROCEDURE — RXMED WILLOW AMBULATORY MEDICATION CHARGE: Performed by: DENTIST

## 2025-03-11 NOTE — TELEPHONE ENCOUNTER
Received request via: Pharmacy    Was the patient seen in the last year in this department? Yes    Does the patient have an active prescription (recently filled or refills available) for medication(s) requested? No    [facility]: [facility]    Does the patient have custodial Plus and need 100-day supply? (This applies to ALL medications) Patient does not have SCP

## 2025-03-13 ENCOUNTER — PHARMACY VISIT (OUTPATIENT)
Dept: PHARMACY | Facility: MEDICAL CENTER | Age: 55
End: 2025-03-13
Payer: COMMERCIAL

## 2025-03-13 PROCEDURE — RXMED WILLOW AMBULATORY MEDICATION CHARGE: Performed by: FAMILY MEDICINE

## 2025-03-13 PROCEDURE — RXMED WILLOW AMBULATORY MEDICATION CHARGE: Performed by: NURSE PRACTITIONER

## 2025-03-17 ENCOUNTER — PHARMACY VISIT (OUTPATIENT)
Dept: PHARMACY | Facility: MEDICAL CENTER | Age: 55
End: 2025-03-17
Payer: COMMERCIAL

## 2025-03-17 PROCEDURE — RXMED WILLOW AMBULATORY MEDICATION CHARGE: Performed by: FAMILY MEDICINE

## 2025-03-17 RX ORDER — LOSARTAN POTASSIUM 25 MG/1
25 TABLET ORAL DAILY
Qty: 30 TABLET | Refills: 5 | OUTPATIENT
Start: 2025-03-17

## 2025-03-24 ENCOUNTER — PHARMACY VISIT (OUTPATIENT)
Dept: PHARMACY | Facility: MEDICAL CENTER | Age: 55
End: 2025-03-24
Payer: COMMERCIAL

## 2025-03-24 PROCEDURE — RXMED WILLOW AMBULATORY MEDICATION CHARGE: Performed by: FAMILY MEDICINE

## 2025-03-24 PROCEDURE — RXMED WILLOW AMBULATORY MEDICATION CHARGE: Performed by: NURSE PRACTITIONER

## 2025-04-05 ENCOUNTER — HOSPITAL ENCOUNTER (OUTPATIENT)
Dept: LAB | Facility: MEDICAL CENTER | Age: 55
End: 2025-04-05
Attending: NURSE PRACTITIONER
Payer: MEDICAID

## 2025-04-05 ENCOUNTER — HOSPITAL ENCOUNTER (OUTPATIENT)
Dept: LAB | Facility: MEDICAL CENTER | Age: 55
End: 2025-04-05
Attending: FAMILY MEDICINE
Payer: MEDICAID

## 2025-04-05 DIAGNOSIS — E78.3 CHYLOMICRONEMIA SYNDROME: ICD-10-CM

## 2025-04-05 DIAGNOSIS — E78.1 FAMILIAL HYPERTRIGLYCERIDEMIA: ICD-10-CM

## 2025-04-05 DIAGNOSIS — E11.9 TYPE 2 DIABETES MELLITUS WITHOUT COMPLICATION, WITHOUT LONG-TERM CURRENT USE OF INSULIN (HCC): ICD-10-CM

## 2025-04-05 LAB
25(OH)D3 SERPL-MCNC: 24 NG/ML (ref 30–100)
ALBUMIN SERPL BCP-MCNC: 4.6 G/DL (ref 3.2–4.9)
ALBUMIN/GLOB SERPL: 1.4 G/DL
ALP SERPL-CCNC: 68 U/L (ref 30–99)
ALT SERPL-CCNC: 40 U/L (ref 2–50)
ANION GAP SERPL CALC-SCNC: 13 MMOL/L (ref 7–16)
AST SERPL-CCNC: 36 U/L (ref 12–45)
BILIRUB SERPL-MCNC: 0.3 MG/DL (ref 0.1–1.5)
BUN SERPL-MCNC: 22 MG/DL (ref 8–22)
CALCIUM ALBUM COR SERPL-MCNC: 9.4 MG/DL (ref 8.5–10.5)
CALCIUM SERPL-MCNC: 9.9 MG/DL (ref 8.5–10.5)
CHLORIDE SERPL-SCNC: 108 MMOL/L (ref 96–112)
CHOLEST SERPL-MCNC: 133 MG/DL (ref 100–199)
CO2 SERPL-SCNC: 19 MMOL/L (ref 20–33)
CREAT SERPL-MCNC: 1.03 MG/DL (ref 0.5–1.4)
CREAT SERPL-MCNC: 1.04 MG/DL (ref 0.5–1.4)
CREAT UR-MCNC: 112 MG/DL
EST. AVERAGE GLUCOSE BLD GHB EST-MCNC: 186 MG/DL
GFR SERPLBLD CREATININE-BSD FMLA CKD-EPI: 85 ML/MIN/1.73 M 2
GFR SERPLBLD CREATININE-BSD FMLA CKD-EPI: 86 ML/MIN/1.73 M 2
GLOBULIN SER CALC-MCNC: 3.3 G/DL (ref 1.9–3.5)
GLUCOSE SERPL-MCNC: 103 MG/DL (ref 65–99)
HBA1C MFR BLD: 8.1 % (ref 4–5.6)
HDLC SERPL-MCNC: 67 MG/DL
LDLC SERPL CALC-MCNC: 47 MG/DL
MICROALBUMIN UR-MCNC: <1.2 MG/DL
MICROALBUMIN/CREAT UR: NORMAL MG/G (ref 0–30)
POTASSIUM SERPL-SCNC: 4.2 MMOL/L (ref 3.6–5.5)
PROT SERPL-MCNC: 7.9 G/DL (ref 6–8.2)
SODIUM SERPL-SCNC: 140 MMOL/L (ref 135–145)
TRIGL SERPL-MCNC: 93 MG/DL (ref 0–149)

## 2025-04-05 PROCEDURE — 82172 ASSAY OF APOLIPOPROTEIN: CPT

## 2025-04-05 PROCEDURE — 82306 VITAMIN D 25 HYDROXY: CPT

## 2025-04-05 PROCEDURE — 80053 COMPREHEN METABOLIC PANEL: CPT

## 2025-04-05 PROCEDURE — 36415 COLL VENOUS BLD VENIPUNCTURE: CPT

## 2025-04-05 PROCEDURE — 82043 UR ALBUMIN QUANTITATIVE: CPT

## 2025-04-05 PROCEDURE — 80061 LIPID PANEL: CPT

## 2025-04-05 PROCEDURE — 83036 HEMOGLOBIN GLYCOSYLATED A1C: CPT

## 2025-04-05 PROCEDURE — 82565 ASSAY OF CREATININE: CPT

## 2025-04-05 PROCEDURE — 82570 ASSAY OF URINE CREATININE: CPT

## 2025-04-05 PROCEDURE — 83721 ASSAY OF BLOOD LIPOPROTEIN: CPT

## 2025-04-07 ENCOUNTER — PHARMACY VISIT (OUTPATIENT)
Dept: PHARMACY | Facility: MEDICAL CENTER | Age: 55
End: 2025-04-07
Payer: COMMERCIAL

## 2025-04-07 ENCOUNTER — OFFICE VISIT (OUTPATIENT)
Dept: MEDICAL GROUP | Facility: MEDICAL CENTER | Age: 55
End: 2025-04-07
Attending: NURSE PRACTITIONER
Payer: MEDICAID

## 2025-04-07 VITALS
HEIGHT: 66 IN | OXYGEN SATURATION: 96 % | HEART RATE: 77 BPM | WEIGHT: 178.1 LBS | BODY MASS INDEX: 28.62 KG/M2 | SYSTOLIC BLOOD PRESSURE: 102 MMHG | RESPIRATION RATE: 16 BRPM | TEMPERATURE: 96.8 F | DIASTOLIC BLOOD PRESSURE: 68 MMHG

## 2025-04-07 DIAGNOSIS — I15.2 HYPERTENSION ASSOCIATED WITH TYPE 2 DIABETES MELLITUS (HCC): ICD-10-CM

## 2025-04-07 DIAGNOSIS — E11.59 HYPERTENSION ASSOCIATED WITH TYPE 2 DIABETES MELLITUS (HCC): ICD-10-CM

## 2025-04-07 DIAGNOSIS — E78.5 HYPERLIPIDEMIA, UNSPECIFIED HYPERLIPIDEMIA TYPE: ICD-10-CM

## 2025-04-07 DIAGNOSIS — G47.00 INSOMNIA, UNSPECIFIED TYPE: ICD-10-CM

## 2025-04-07 DIAGNOSIS — E78.1 FAMILIAL HYPERTRIGLYCERIDEMIA: ICD-10-CM

## 2025-04-07 DIAGNOSIS — E78.3 CHYLOMICRONEMIA SYNDROME: ICD-10-CM

## 2025-04-07 DIAGNOSIS — M1A.0790 IDIOPATHIC CHRONIC GOUT OF FOOT WITHOUT TOPHUS, UNSPECIFIED LATERALITY: ICD-10-CM

## 2025-04-07 DIAGNOSIS — E11.9 TYPE 2 DIABETES MELLITUS WITHOUT COMPLICATION, WITHOUT LONG-TERM CURRENT USE OF INSULIN (HCC): ICD-10-CM

## 2025-04-07 LAB
APO B100 SERPL-MCNC: 58 MG/DL (ref 66–133)
LDLC SERPL-MCNC: 45 MG/DL (ref 0–129)

## 2025-04-07 PROCEDURE — 3074F SYST BP LT 130 MM HG: CPT | Performed by: NURSE PRACTITIONER

## 2025-04-07 PROCEDURE — 99212 OFFICE O/P EST SF 10 MIN: CPT | Performed by: NURSE PRACTITIONER

## 2025-04-07 PROCEDURE — 3078F DIAST BP <80 MM HG: CPT | Performed by: NURSE PRACTITIONER

## 2025-04-07 PROCEDURE — RXMED WILLOW AMBULATORY MEDICATION CHARGE: Performed by: NURSE PRACTITIONER

## 2025-04-07 PROCEDURE — 99214 OFFICE O/P EST MOD 30 MIN: CPT | Performed by: NURSE PRACTITIONER

## 2025-04-07 RX ORDER — ACYCLOVIR 400 MG/1
TABLET ORAL
Qty: 3 EACH | Refills: 5 | Status: SHIPPED | OUTPATIENT
Start: 2025-04-07

## 2025-04-07 RX ORDER — SEMAGLUTIDE 0.68 MG/ML
0.25 INJECTION, SOLUTION SUBCUTANEOUS
Qty: 3 ML | Refills: 11 | Status: SHIPPED | OUTPATIENT
Start: 2025-04-07 | End: 2025-04-24

## 2025-04-07 RX ORDER — ALLOPURINOL 300 MG/1
300 TABLET ORAL 2 TIMES DAILY
Qty: 30 TABLET | Refills: 4 | Status: SHIPPED | OUTPATIENT
Start: 2025-04-07

## 2025-04-07 RX ORDER — VALSARTAN 160 MG/1
160 TABLET ORAL DAILY
Qty: 100 TABLET | Refills: 3 | Status: SHIPPED | OUTPATIENT
Start: 2025-04-07

## 2025-04-07 RX ORDER — ATORVASTATIN CALCIUM 40 MG/1
40 TABLET, FILM COATED ORAL NIGHTLY
Qty: 100 TABLET | Refills: 3 | Status: SHIPPED | OUTPATIENT
Start: 2025-04-07

## 2025-04-07 RX ORDER — DOXEPIN HYDROCHLORIDE 10 MG/1
10 CAPSULE ORAL NIGHTLY
Qty: 30 CAPSULE | Refills: 1 | Status: SHIPPED | OUTPATIENT
Start: 2025-04-07

## 2025-04-07 RX ORDER — OMEGA-3-ACID ETHYL ESTERS 1 G/1
2 CAPSULE, LIQUID FILLED ORAL 2 TIMES DAILY
Qty: 360 CAPSULE | Refills: 3 | Status: SHIPPED | OUTPATIENT
Start: 2025-04-07

## 2025-04-07 RX ORDER — ACETAMINOPHEN 500 MG
500-1000 TABLET ORAL EVERY 6 HOURS PRN
Qty: 60 TABLET | Refills: 0 | Status: SHIPPED | OUTPATIENT
Start: 2025-04-07

## 2025-04-07 RX ORDER — DAPAGLIFLOZIN AND METFORMIN HYDROCHLORIDE 5; 1000 MG/1; MG/1
TABLET, FILM COATED, EXTENDED RELEASE ORAL
Qty: 180 TABLET | Refills: 5 | Status: SHIPPED | OUTPATIENT
Start: 2025-04-07

## 2025-04-07 RX ORDER — COLCHICINE 0.6 MG/1
TABLET ORAL
Qty: 3 TABLET | Refills: 2 | Status: SHIPPED | OUTPATIENT
Start: 2025-04-07

## 2025-04-07 NOTE — PROGRESS NOTES
Verbal consent was acquired by the patient to use [employer] ambient listening note generation during this visit     Chief Complaint   Patient presents with    Other     Get treated for gout.    Medication Refill       Subjective:     HPI:   History of Present Illness  The patient presents for evaluation of diabetes mellitus and gout.    He has been experiencing hypoglycemia, with a blood glucose level of 42 recorded this morning. He has been self-adjusting his insulin dosage, opting not to administer the 10 units at night due to concerns of potential hypoglycemia. He uses insulin during the day if his blood sugar exceeds 150 or if he anticipates consuming a large meal, particularly at dinner when his blood sugar tends to peak. However, he has not required insulin for the past week as his blood sugar levels have remained stable. He has been diligent in monitoring his blood sugar before meals and has made dietary modifications, including reducing his intake of sweets and rice. He has observed that his blood sugar can increase from 95 to 150 after a large meal but will decrease with insulin administration. He is currently under the care of Dr. Lee and Dr. Alexis Abbott. He has requested refills for all his medications and Dexcom. He recently had his lab work done last Saturday and is curious about his current A1c level.    He experienced a gout flare-up two weeks ago and is seeking advice on whether to continue his prednisone treatment. He has previously used colchicine for gout management. He finds relief from pain with a combination of ibuprofen and Tylenol and is requesting a refill of Tylenol.    He is also requesting a refill of his sleep medication, which he reports as being effective.    Supplemental Information  He has lost weight and is working now. He has been maintaining good dental hygiene.    SOCIAL HISTORY  He does not drink soda anymore but drinks 0.1 once in a while. He drinks a lot of water  now. He eats a lot of greens and salads and a little bit of fatty foods once in a while.    MEDICATIONS  Current: insulin, Tylenol, ibuprofen, prednisone  Past: colchicine        No problems updated.    ROS  See HPI     Allergies   Allergen Reactions    Pcn [Penicillins]     Trazodone        Current medicines (including changes today)  Current Outpatient Medications   Medication Sig Dispense Refill    acetaminophen (TYLENOL) 500 MG Tab Take 1-2 Tablets by mouth every 6 hours as needed for Moderate Pain. 60 Tablet 0    allopurinol (ZYLOPRIM) 300 MG Tab Take 1 Tablet by mouth 2 times a day. 30 Tablet 4    atorvastatin (LIPITOR) 40 MG Tab Take 1 Tablet by mouth every evening. 100 Tablet 3    Continuous Glucose Sensor (DEXCOM G7 SENSOR) Misc Use changing every 10 days 3 Each 5    Dapagliflozin Pro-metFORMIN ER 5-1000 MG TABLET SR 24 HR Take 1 tablet by mouth 2 times a day 180 Tablet 5    valsartan (DIOVAN) 160 MG Tab Take 1 Tablet by mouth every day. To lower blood pressure 100 Tablet 3    Semaglutide,0.25 or 0.5MG/DOS, (OZEMPIC, 0.25 OR 0.5 MG/DOSE,) 2 MG/3ML Solution Pen-injector Inject 0.25 mg under the skin every 7 days. Increase to 0.5mg weekly when tolerated. 3 mL 11    omega-3 acid ethyl esters (LOVAZA) 1 GM capsule Take 2 Capsules by mouth 2 times a day. To lower triglycerides 360 Capsule 3    colchicine (COLCRYS) 0.6 MG Tab Take 2 tablets by mouth once then follow with one additional tab an hour later 3 Tablet 2    doxepin (SINEQUAN) 10 MG Cap Take 1 Capsule by mouth every evening. 30 Capsule 1    ibuprofen (MOTRIN) 600 MG Tab Take 1 tablet by mouth every 6 to 8 hours as needed for pain 40 Tablet 0    chlorhexidine (PERIDEX) 0.12 % Solution Swish and spit 15 ml twice daily rinsing for 30 seconds morning and evening after toothbrushing 473 mL 3    insulin lispro 100 UNIT/ML SC SOPN injection PEN Use 5-6 units before meals. Then add per sliding scale. 150-200- 1 unit, 200-250 - 2 units , 250-300 - 3 units,  300-350 - 4 units, 350-400 - 5 units, 400+ 6 units . Max 40 units a day 12 mL 5    COVID-19 mRNA Vac-Randi,Pfizer, (COMIRNATY) 30 MCG/0.3ML Suspension Prefilled Syringe injection Inject  into the shoulder, thigh, or buttocks. 0.3 mL 0    insulin glargine 100 UNIT/ML SC SOPN injection Inject 16 units subq daily or as directed (max 50 units/day) 15 mL 5    Choline Fenofibrate (FENOFIBRIC ACID) 135 MG CAPSULE DELAYED RELEASE Take 1 Capsule by mouth every day. To lower triglycerides 100 Capsule 3    Insulin Pen Needle Use with insulin pen as directed 100 Each 5    glucose blood strip Test blood sugar as recommended by provider.  blood glucose monitoring kit. 300 Strip 11    Blood Glucose Monitoring Suppl (TRUE METRIX METER) w/Device Kit Use one strip to test blood sugar three times daily. 1 Kit 11    TechLite AST Lancets Misc Use one lancet to test blood sugar three times daily. 300 Each 11    Alcohol Swabs Wipe site with prep pad prior to injection. 300 Each 11     No current facility-administered medications for this visit.       Social History     Tobacco Use    Smoking status: Every Day     Current packs/day: 0.50     Average packs/day: 0.5 packs/day for 40.3 years (20.1 ttl pk-yrs)     Types: Cigarettes     Start date: 1985    Smokeless tobacco: Never    Tobacco comments:     Patient is working on reducing cigarette use. He cut back from 15 cigarettes per day to 8-9 cigarettes per day.   Vaping Use    Vaping status: Never Used   Substance Use Topics    Alcohol use: Not Currently     Comment: occ    Drug use: Yes     Types: Methamphetamines     Comment: patient quit meth 09/09/22       Patient Active Problem List    Diagnosis Date Noted    Type 2 diabetes mellitus with other circulatory complication, without long-term current use of insulin (HCC) 02/13/2025    Microalbuminuria due to type 2 diabetes mellitus (HCC) 02/13/2025    Hypertension associated with type 2 diabetes mellitus (HCC) 02/13/2025    Primary biliary  "cholangitis (HCC) 02/13/2025    Elevated alkaline phosphatase level 02/13/2025    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    Familial hypertriglyceridemia 01/09/2025    Pseudohyponatremia 01/09/2025    Encounter to establish care 10/17/2022    Other hyperlipidemia 06/07/2022    Paroxysmal atrial fibrillation (HCC) 06/07/2022    Type 2 diabetes mellitus without complication, without long-term current use of insulin (HCC) 06/07/2022    Chronic idiopathic gout of foot 06/07/2022       Family History   Problem Relation Age of Onset    Cancer Mother         Lung    Alzheimer's Disease Mother     Psychiatric Illness Father         PTSD    Stroke Father     Cancer Father         throat    Lung Disease Sister     Cancer Sister         Lung, bones, brain    Diabetes Sister         5 sister T1DM    Psychiatric Illness Brother         PTSD    Diabetes Brother         5 siblings are T2DM    Familial Hypercholesterolemia Neg Hx           Objective:     /68 (BP Location: Left arm, Patient Position: Sitting, BP Cuff Size: Adult)   Pulse 77   Temp 36 °C (96.8 °F) (Temporal)   Resp 16   Ht 1.676 m (5' 6\")   Wt 80.8 kg (178 lb 1.6 oz)   SpO2 96%  Body mass index is 28.75 kg/m².    Physical Exam:  Physical Exam  Vitals reviewed.   Constitutional:       General: He is awake.      Appearance: Normal appearance. He is well-developed.   HENT:      Head: Normocephalic.   Eyes:      Conjunctiva/sclera: Conjunctivae normal.   Cardiovascular:      Rate and Rhythm: Normal rate.   Pulmonary:      Effort: Pulmonary effort is normal. No respiratory distress.   Musculoskeletal:      Cervical back: Neck supple.   Skin:     General: Skin is warm and dry.   Neurological:      Mental Status: He is alert and oriented to person, place, and time.   Psychiatric:         Mood and Affect: Mood normal.         Behavior: Behavior normal. Behavior is cooperative.              Assessment and Plan:     The following treatment plan was " discussed:    Problem List Items Addressed This Visit       Type 2 diabetes mellitus without complication, without long-term current use of insulin (HCC)    Relevant Medications    Continuous Glucose Sensor (DEXCOM G7 SENSOR) Misc    Dapagliflozin Pro-metFORMIN ER 5-1000 MG TABLET SR 24 HR    Semaglutide,0.25 or 0.5MG/DOS, (OZEMPIC, 0.25 OR 0.5 MG/DOSE,) 2 MG/3ML Solution Pen-injector    Chronic idiopathic gout of foot    Relevant Medications    acetaminophen (TYLENOL) 500 MG Tab    allopurinol (ZYLOPRIM) 300 MG Tab    colchicine (COLCRYS) 0.6 MG Tab    Chylomicronemia syndrome    Relevant Medications    atorvastatin (LIPITOR) 40 MG Tab    valsartan (DIOVAN) 160 MG Tab    omega-3 acid ethyl esters (LOVAZA) 1 GM capsule    Familial hypertriglyceridemia    Relevant Medications    atorvastatin (LIPITOR) 40 MG Tab    valsartan (DIOVAN) 160 MG Tab    omega-3 acid ethyl esters (LOVAZA) 1 GM capsule    Hypertension associated with type 2 diabetes mellitus (HCC)    Relevant Medications    atorvastatin (LIPITOR) 40 MG Tab    Dapagliflozin Pro-metFORMIN ER 5-1000 MG TABLET SR 24 HR    valsartan (DIOVAN) 160 MG Tab    Semaglutide,0.25 or 0.5MG/DOS, (OZEMPIC, 0.25 OR 0.5 MG/DOSE,) 2 MG/3ML Solution Pen-injector    omega-3 acid ethyl esters (LOVAZA) 1 GM capsule     Other Visit Diagnoses         Hyperlipidemia, unspecified hyperlipidemia type        Relevant Medications    atorvastatin (LIPITOR) 40 MG Tab    valsartan (DIOVAN) 160 MG Tab    omega-3 acid ethyl esters (LOVAZA) 1 GM capsule      Insomnia, unspecified type        Relevant Medications    doxepin (SINEQUAN) 10 MG Cap            Assessment & Plan  1. Diabetes Mellitus.  His A1c levels have shown significant improvement, decreasing from 12.3 to 8.1. He is advised to continue monitoring his blood sugar levels and adjust his insulin dosage accordingly to avoid hypoglycemia. Refills for all his medications, including Dexcom, have been provided.    2. Gout.  He experienced  a gout flare-up two weeks ago. He is advised to take colchicine only on the first day of a gout flare-up, with a dosage of two tablets initially, followed by one tablet an hour later. A prescription for colchicine has been provided.    3. Medication Management.  Refills for all his medications, including Tylenol and his sleep aid, have been provided. The prescriptions have been sent to the [facility] pharmacy.    Follow-up  The patient will follow up in 3 months.    Any change or worsening of signs or symptoms, patient encouraged to follow-up or report to emergency room for further evaluation. Patient verbalizes understanding and agrees.      PLEASE NOTE: This dictation was created using voice recognition software. I have made every reasonable attempt to correct obvious errors, but I expect that there are errors of grammar and possibly content that I did not discover before finalizing the note.

## 2025-04-08 ENCOUNTER — TELEPHONE (OUTPATIENT)
Dept: MEDICAL GROUP | Facility: MEDICAL CENTER | Age: 55
End: 2025-04-08
Payer: MEDICAID

## 2025-04-08 NOTE — TELEPHONE ENCOUNTER
DOCUMENTATION OF PAR STATUS:    1. Name of Medication & Dose:  Dapagliflozin Pro-metFORMIN ER 5-1000 MG TABLET SR 24 HR     2. Name of Prescription Coverage Company & phone #: [employer]    3. Date Prior Auth Submitted: 4/8/25    4. What information was given to obtain insurance decision? Chart notes,icd-10 code,med hx.    5. Prior Auth Status? Pending    6. Patient Notified: yes

## 2025-04-11 ENCOUNTER — TELEPHONE (OUTPATIENT)
Dept: MEDICAL GROUP | Facility: MEDICAL CENTER | Age: 55
End: 2025-04-11
Payer: MEDICAID

## 2025-04-11 PROCEDURE — RXMED WILLOW AMBULATORY MEDICATION CHARGE: Performed by: NURSE PRACTITIONER

## 2025-04-11 PROCEDURE — RXMED WILLOW AMBULATORY MEDICATION CHARGE: Performed by: DENTIST

## 2025-04-11 NOTE — TELEPHONE ENCOUNTER
FINAL PRIOR AUTHORIZATION STATUS:  Dapagliflozin Pro-metFORMIN   1.  Name of Medication & Dose:      2. Prior Auth Status: Denied.  Reason: scanned under     3. Action Taken: Pharmacy Notified: N\A Patient Notified: yes

## 2025-04-14 ENCOUNTER — PHARMACY VISIT (OUTPATIENT)
Dept: PHARMACY | Facility: MEDICAL CENTER | Age: 55
End: 2025-04-14
Payer: COMMERCIAL

## 2025-04-14 PROCEDURE — RXMED WILLOW AMBULATORY MEDICATION CHARGE: Performed by: NURSE PRACTITIONER

## 2025-04-15 ENCOUNTER — PHARMACY VISIT (OUTPATIENT)
Dept: PHARMACY | Facility: MEDICAL CENTER | Age: 55
End: 2025-04-15
Payer: COMMERCIAL

## 2025-04-24 ENCOUNTER — PHARMACY VISIT (OUTPATIENT)
Dept: PHARMACY | Facility: MEDICAL CENTER | Age: 55
End: 2025-04-24
Payer: COMMERCIAL

## 2025-04-24 ENCOUNTER — OFFICE VISIT (OUTPATIENT)
Dept: MEDICAL GROUP | Facility: MEDICAL CENTER | Age: 55
End: 2025-04-24
Attending: INTERNAL MEDICINE
Payer: MEDICAID

## 2025-04-24 VITALS
HEART RATE: 79 BPM | DIASTOLIC BLOOD PRESSURE: 72 MMHG | BODY MASS INDEX: 27 KG/M2 | HEIGHT: 66 IN | WEIGHT: 168 LBS | SYSTOLIC BLOOD PRESSURE: 106 MMHG

## 2025-04-24 DIAGNOSIS — E11.9 TYPE 2 DIABETES MELLITUS WITHOUT COMPLICATION, WITHOUT LONG-TERM CURRENT USE OF INSULIN (HCC): ICD-10-CM

## 2025-04-24 PROCEDURE — RXMED WILLOW AMBULATORY MEDICATION CHARGE: Performed by: FAMILY MEDICINE

## 2025-04-24 PROCEDURE — RXMED WILLOW AMBULATORY MEDICATION CHARGE: Performed by: NURSE PRACTITIONER

## 2025-04-24 PROCEDURE — RXMED WILLOW AMBULATORY MEDICATION CHARGE: Performed by: INTERNAL MEDICINE

## 2025-04-24 PROCEDURE — 99213 OFFICE O/P EST LOW 20 MIN: CPT | Performed by: PHARMACIST

## 2025-04-24 RX ORDER — ERGOCALCIFEROL 1.25 MG/1
50000 CAPSULE ORAL
Qty: 4 CAPSULE | Refills: 2 | Status: SHIPPED | OUTPATIENT
Start: 2025-04-24

## 2025-04-24 RX ORDER — ERGOCALCIFEROL 1.25 MG/1
50000 CAPSULE ORAL DAILY
Qty: 4 CAPSULE | Refills: 2 | Status: SHIPPED | OUTPATIENT
Start: 2025-04-24 | End: 2025-04-24

## 2025-04-24 RX ORDER — SEMAGLUTIDE 1.34 MG/ML
1 INJECTION, SOLUTION SUBCUTANEOUS
Qty: 2 ML | Refills: 5 | Status: SHIPPED | OUTPATIENT
Start: 2025-04-24

## 2025-04-24 NOTE — PROGRESS NOTES
New Patient Consult Note  Primary care physician: DELMIS Gutierrez    Reason for consult: Management of Uncontrolled Type 2 Diabetes    HPI:  Divya Damon is a 55 y.o. old patient who comes in today for evaluation of above stated problem.    Pt says he's been feeling good and having less cravings with sugar. He doesn't drink regular soda anymore, and may only occasionally sip on pepsi zero. He has had less appetite with ozempic and is tolerating with no extreme side effects. He craves sweets and cake less as well.      He mentioned a few nights he was having low BG and because of this concern he startted skipping his lantus a few times if his BG was low before bedtime. He also would only use lispro if he is eating dinner and his BG was above 150, otherwise he wouldn't use it.     I reviewed his recent lab results with patient as well.         Most Recent HbA1c:   Lab Results   Component Value Date/Time    HBA1C 8.1 (H) 04/05/2025 07:26 AM    HBA1C 12.3 (H) 01/07/2025 07:54 AM    HBA1C 7.0 (A) 03/28/2023 09:22 AM               Current Diabetes Regimen:  GLP-1 Agent: Ozempic 0.5mg weekly    SGLT-2 Inhibitor:    Metformin + SGLT-2 Inhibitor: Dapagiflozin  XR 5/1000mg BID    Basal Insulin: Lantus 10 units qhs but skips some nights because of some hypoglycemia at night   Prandial Insulin: Lispro  - when over 150, 4u with dinner       Cgm :     Hypoglycemia:  Occasional      ROS:  Constitutional: No weight loss  Cardiac: No palpitations or racing heart  Resp: No shortness of breath  Neuro: No numbness or tinging in feet  Endo: No heat or cold intolerance, no polyuria or polydipsia  All other systems were reviewed and were negative.    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    Familial hypertriglyceridemia 01/09/2025    Pseudohyponatremia 01/09/2025    Encounter to establish care 10/17/2022    Other hyperlipidemia 06/07/2022    Paroxysmal atrial fibrillation (HCC) 06/07/2022    Type 2 diabetes mellitus without complication, without long-term current use of insulin (HCC) 06/07/2022    Chronic idiopathic gout of foot 06/07/2022       Past Surgical History:  Past Surgical History:   Procedure Laterality Date    LYMPH NODE EXCISION         Allergies:  Pcn [penicillins] and Trazodone    Social History:  Social History     Socioeconomic History    Marital status: Single     Spouse name: Not on file    Number of children: Not on file    Years of education: Not on file    Highest education level: Not on file   Occupational History    Not on file   Tobacco Use    Smoking status: Every Day     Current packs/day: 0.50     Average packs/day: 0.5 packs/day for 40.3 years (20.2 ttl pk-yrs)     Types: Cigarettes     Start date: 1985    Smokeless tobacco: Never    Tobacco comments:     Patient is working on reducing cigarette use. He cut back from 15 cigarettes per day to 8-9 cigarettes per day.   Vaping Use    Vaping status: Never Used   Substance and Sexual Activity    Alcohol use: Not Currently     Comment: occ    Drug use: Yes     Types: Methamphetamines     Comment: patient quit meth 09/09/22    Sexual activity: Yes     Partners: Male     Birth control/protection: Condom   Other Topics Concern    Not on file   Social History Narrative    Not on file     Social Drivers of Health     Financial Resource Strain: Not on file   Food Insecurity: Not on file   Transportation Needs: Not on file   Physical Activity: Not on file   Stress: Not on file   Social Connections: Not on file   Intimate Partner  Violence: Not on file   Housing Stability: Not on file       Family History:  Family History   Problem Relation Age of Onset    Cancer Mother         Lung    Alzheimer's Disease Mother     Psychiatric Illness Father         PTSD    Stroke Father     Cancer Father         throat    Lung Disease Sister     Cancer Sister         Lung, bones, brain    Diabetes Sister         5 sister T1DM    Psychiatric Illness Brother         PTSD    Diabetes Brother         5 siblings are T2DM    Familial Hypercholesterolemia Neg Hx        Medications:    Current Outpatient Medications:     Semaglutide, 1 MG/DOSE, (OZEMPIC, 1 MG/DOSE,) 4 MG/3ML Solution Pen-injector, Inject 1 mg under the skin every 7 days., Disp: 2 mL, Rfl: 5    ergocalciferol (DRISDOL) 21641 UNIT capsule, Take 1 Capsule by mouth every 7 days., Disp: 4 Capsule, Rfl: 2    acetaminophen (TYLENOL) 500 MG Tab, Take 1-2 Tablets by mouth every 6 hours as needed for Moderate Pain., Disp: 60 Tablet, Rfl: 0    allopurinol (ZYLOPRIM) 300 MG Tab, Take 1 Tablet by mouth 2 times a day., Disp: 30 Tablet, Rfl: 4    atorvastatin (LIPITOR) 40 MG Tab, Take 1 Tablet by mouth every evening., Disp: 100 Tablet, Rfl: 3    Continuous Glucose Sensor (DEXCOM G7 SENSOR) Misc, Use changing every 10 days, Disp: 3 Each, Rfl: 5    Dapagliflozin Pro-metFORMIN ER 5-1000 MG TABLET SR 24 HR, Take 1 tablet by mouth 2 times a day, Disp: 180 Tablet, Rfl: 5    valsartan (DIOVAN) 160 MG Tab, Take 1 Tablet by mouth every day. To lower blood pressure, Disp: 100 Tablet, Rfl: 3    omega-3 acid ethyl esters (LOVAZA) 1 GM capsule, Take 2 Capsules by mouth 2 times a day. To lower triglycerides, Disp: 360 Capsule, Rfl: 3    colchicine (COLCRYS) 0.6 MG Tab, Take 2 tablets by mouth once then follow with one additional tab an hour later, Disp: 3 Tablet, Rfl: 2    doxepin (SINEQUAN) 10 MG Cap, Take 1 Capsule by mouth every evening., Disp: 30 Capsule, Rfl: 1    ibuprofen (MOTRIN) 600 MG Tab, Take 1 tablet by mouth  "every 6 to 8 hours as needed for pain, Disp: 40 Tablet, Rfl: 0    chlorhexidine (PERIDEX) 0.12 % Solution, Swish and spit 15 ml twice daily rinsing for 30 seconds morning and evening after toothbrushing, Disp: 473 mL, Rfl: 3    insulin lispro 100 UNIT/ML SC SOPN injection PEN, Use 5-6 units before meals. Then add per sliding scale. 150-200- 1 unit, 200-250 - 2 units , 250-300 - 3 units, 300-350 - 4 units, 350-400 - 5 units, 400+ 6 units . Max 40 units a day, Disp: 12 mL, Rfl: 5    COVID-19 mRNA Vac-Randi,Pfizer, (COMIRNATY) 30 MCG/0.3ML Suspension Prefilled Syringe injection, Inject  into the shoulder, thigh, or buttocks., Disp: 0.3 mL, Rfl: 0    insulin glargine 100 UNIT/ML SC SOPN injection, Inject 16 units subq daily or as directed (max 50 units/day), Disp: 15 mL, Rfl: 5    Choline Fenofibrate (FENOFIBRIC ACID) 135 MG CAPSULE DELAYED RELEASE, Take 1 Capsule by mouth every day. To lower triglycerides, Disp: 100 Capsule, Rfl: 3    Insulin Pen Needle, Use with insulin pen as directed, Disp: 100 Each, Rfl: 5    glucose blood strip, Test blood sugar as recommended by provider.  blood glucose monitoring kit., Disp: 300 Strip, Rfl: 11    Blood Glucose Monitoring Suppl (TRUE METRIX METER) w/Device Kit, Use one strip to test blood sugar three times daily., Disp: 1 Kit, Rfl: 11    TechLite AST Lancets Misc, Use one lancet to test blood sugar three times daily., Disp: 300 Each, Rfl: 11    Alcohol Swabs, Wipe site with prep pad prior to injection., Disp: 300 Each, Rfl: 11    Labs: Reviewed    Physical Examination:  Vital signs: /72   Pulse 79   Ht 1.676 m (5' 6\")   Wt 76.2 kg (168 lb)   BMI 27.12 kg/m²  Body mass index is 27.12 kg/m².  General: No apparent distress, cooperative  Eyes: No scleral icterus or discharge  ENMT: Normal on external inspection of nose, lips, normal thyroid exam  Neck: No abnormal masses on inspection  Resp: Normal effort, clear to auscultation bilaterally   CVS: Regular rate and rhythm, S1 " S2 normal, no murmur   Extremities: No edema  Abdomen: abdominal obesity present  Neuro: Alert and oriented  Skin: No rash  Psych: Normal mood and affect, intact memory and able to make informed decisions    Plan:    There are no diagnoses linked to this encounter.    Return in about 3 weeks (around 5/15/2025).    DM - Patients Time in range shows great improvement in his BG even to the point that some nights he gets concerned getting too low. He has been losing weight as well and made positive changes to his diet. Will trial no lantus for now.   - Increase Ozempic 1mg weekly   - continue Dapagiflozin / metformin BID max dose  - Trial no lantus once dose of ozempic is increased   - may use Humalog 3 units with dinner if heavy or carb containing         Thank you for allowing me to participate in the care of this patient.    Jesus Khanna, PharmD 04/24/25    CC:   DELMIS Gutierrez

## 2025-05-04 DIAGNOSIS — M1A.0790 IDIOPATHIC CHRONIC GOUT OF FOOT WITHOUT TOPHUS, UNSPECIFIED LATERALITY: ICD-10-CM

## 2025-05-05 PROCEDURE — RXMED WILLOW AMBULATORY MEDICATION CHARGE: Performed by: NURSE PRACTITIONER

## 2025-05-06 NOTE — TELEPHONE ENCOUNTER
Received request via: Pharmacy    Was the patient seen in the last year in this department? Yes    Does the patient have an active prescription (recently filled or refills available) for medication(s) requested? No    Pharmacy Name: [facility]    Does the patient have long term Plus and need 100-day supply? (This applies to ALL medications) Patient does not have SCP

## 2025-05-07 PROCEDURE — RXMED WILLOW AMBULATORY MEDICATION CHARGE: Performed by: NURSE PRACTITIONER

## 2025-05-07 RX ORDER — ACETAMINOPHEN 500 MG
500-1000 TABLET ORAL EVERY 6 HOURS PRN
Qty: 60 TABLET | Refills: 0 | Status: SHIPPED | OUTPATIENT
Start: 2025-05-07

## 2025-05-07 RX ORDER — IBUPROFEN 600 MG/1
TABLET, FILM COATED ORAL
Qty: 40 TABLET | Refills: 0 | Status: SHIPPED | OUTPATIENT
Start: 2025-05-07

## 2025-05-09 ENCOUNTER — PHARMACY VISIT (OUTPATIENT)
Dept: PHARMACY | Facility: MEDICAL CENTER | Age: 55
End: 2025-05-09
Payer: COMMERCIAL

## 2025-05-12 PROCEDURE — RXMED WILLOW AMBULATORY MEDICATION CHARGE: Performed by: INTERNAL MEDICINE

## 2025-05-12 PROCEDURE — RXMED WILLOW AMBULATORY MEDICATION CHARGE: Performed by: NURSE PRACTITIONER

## 2025-05-12 PROCEDURE — RXMED WILLOW AMBULATORY MEDICATION CHARGE: Performed by: FAMILY MEDICINE

## 2025-05-13 ENCOUNTER — PHARMACY VISIT (OUTPATIENT)
Dept: PHARMACY | Facility: MEDICAL CENTER | Age: 55
End: 2025-05-13
Payer: COMMERCIAL

## 2025-05-15 ENCOUNTER — OFFICE VISIT (OUTPATIENT)
Dept: MEDICAL GROUP | Facility: MEDICAL CENTER | Age: 55
End: 2025-05-15
Attending: INTERNAL MEDICINE
Payer: MEDICAID

## 2025-05-15 VITALS
BODY MASS INDEX: 27.14 KG/M2 | HEART RATE: 82 BPM | HEIGHT: 66 IN | WEIGHT: 168.9 LBS | SYSTOLIC BLOOD PRESSURE: 119 MMHG | DIASTOLIC BLOOD PRESSURE: 71 MMHG

## 2025-05-15 DIAGNOSIS — E11.9 TYPE 2 DIABETES MELLITUS WITHOUT COMPLICATION, WITHOUT LONG-TERM CURRENT USE OF INSULIN (HCC): Primary | ICD-10-CM

## 2025-05-15 PROCEDURE — 99213 OFFICE O/P EST LOW 20 MIN: CPT | Performed by: PHARMACIST

## 2025-05-15 PROCEDURE — 3078F DIAST BP <80 MM HG: CPT | Performed by: INTERNAL MEDICINE

## 2025-05-15 PROCEDURE — 3074F SYST BP LT 130 MM HG: CPT | Performed by: INTERNAL MEDICINE

## 2025-05-15 NOTE — PROGRESS NOTES
New Patient Consult Note  Primary care physician: DELMIS Gutierrez  8:50-9:35  Reason for consult: Management of Controlled Type 2 Diabetes    HPI:  Divya Damon is a 55 y.o. old patient who comes in today for evaluation of above stated problem.    Pt is doing well and overall happy with how his diabetes is now. The ozempic has helped control his appetite as well and he finds that he craves certain foods less, such as spicy food, coke zero, etc. He also reduced his rice after he saw the impact it has on his BG. He eats a lot more vegetable and drinks much more water. He also walks regularly.     Pt is still not ready to smoke but continues to reduce it. He said he's tried nicotine replacement in the past without good results. Currently he smokes about 6-8 cigarettes a day.     Retinal exam due; told him he can schedule an appointment for a retinal screening here in this clinic. He does complain that his vision is a bit blurry, though has been improving since his BG has lowered.         Most Recent HbA1c:   Lab Results   Component Value Date/Time    HBA1C 8.1 (H) 04/05/2025 07:26 AM    HBA1C 12.3 (H) 01/07/2025 07:54 AM    HBA1C 7.0 (A) 03/28/2023 09:22 AM          Current Diabetes Regimen:  GLP-1 Agent: Ozempic 1mg weekly   Metformin + SGLT-2 Inhibitor: Dapagliflozin 5/1000mg BID   Basal Insulin: lantus - stopped since last visit   Prandial Insulin: lispro with heavy meals 3 units (barely uses it though, up to maybe twice a week )       Hypoglycemia:  very rarely below 70       ROS:  Constitutional: No weight loss  Cardiac: No palpitations or racing heart  Resp: No shortness of breath  Neuro: No numbness or tinging in feet  Endo: No heat or cold intolerance, no polyuria or polydipsia  All other systems were reviewed and were negative.    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    Familial hypertriglyceridemia 01/09/2025    Pseudohyponatremia 01/09/2025    Encounter to establish care 10/17/2022    Other hyperlipidemia 06/07/2022    Paroxysmal atrial fibrillation (HCC) 06/07/2022    Type 2 diabetes mellitus without complication, without long-term current use of insulin (HCC) 06/07/2022    Chronic idiopathic gout of foot 06/07/2022       Past Surgical History:  Past Surgical History[1]    Allergies:  Pcn [penicillins] and Trazodone    Social History:  Social History     Socioeconomic History    Marital status: Single     Spouse name: Not on file    Number of children: Not on file    Years of education: Not on file    Highest education level: Not on file   Occupational History    Not on file   Tobacco Use    Smoking status: Every Day     Current packs/day: 0.50     Average packs/day: 0.5 packs/day for 40.4 years (20.2 ttl pk-yrs)     Types: Cigarettes     Start date: 1985    Smokeless tobacco: Never    Tobacco comments:     Patient is working on reducing cigarette use. He cut back from 15 cigarettes per day to 8-9 cigarettes per day.   Vaping Use    Vaping status: Never Used   Substance and Sexual Activity    Alcohol use: Not Currently     Comment: occ    Drug use: Yes     Types: Methamphetamines     Comment: patient quit meth 09/09/22    Sexual activity: Yes     Partners: Male     Birth control/protection: Condom   Other Topics Concern    Not on file   Social History Narrative    Not on file     Social Drivers of Health     Financial Resource Strain: Not on file   Food Insecurity: Not on file   Transportation Needs: Not on file   Physical Activity: Not on file   Stress: Not on  "file   Social Connections: Not on file   Intimate Partner Violence: Not on file   Housing Stability: Not on file       Family History:  Family History   Problem Relation Age of Onset    Cancer Mother         Lung    Alzheimer's Disease Mother     Psychiatric Illness Father         PTSD    Stroke Father     Cancer Father         throat    Lung Disease Sister     Cancer Sister         Lung, bones, brain    Diabetes Sister         5 sister T1DM    Psychiatric Illness Brother         PTSD    Diabetes Brother         5 siblings are T2DM    Familial Hypercholesterolemia Neg Hx        Medications:  Current Medications[2]    Labs: Reviewed    Physical Examination:  Vital signs: /71   Pulse 82   Ht 1.676 m (5' 6\")   Wt 76.6 kg (168 lb 14.4 oz)   BMI 27.26 kg/m²  Body mass index is 27.26 kg/m².  General: No apparent distress, cooperative  Eyes: No scleral icterus or discharge  ENMT: Normal on external inspection of nose, lips, normal thyroid exam  Neck: No abnormal masses on inspection  Resp: Normal effort, clear to auscultation bilaterally   CVS: Regular rate and rhythm, S1 S2 normal, no murmur   Extremities: No edema  Abdomen: abdominal obesity present  Neuro: Alert and oriented  Skin: No rash  Psych: Normal mood and affect, intact memory and able to make informed decisions    Plan:    There are no diagnoses linked to this encounter.    Return in about 14 weeks (around 8/21/2025).    Dm - Patients time in range and estimated A1c is dexcom show very good control over his blood sugar, without the use of basal insulin. He has made many positive changes to his lifestyle that give him a good outlook on his diabetes.   - continue current therapy   - schedule for retinal screening     Thank you for allowing me to participate in the care of this patient.    Jesus Khanna, PharmD   05/15/25    CC:   Jael Morejon, A.P.RAlvaNAlva           [1]   Past Surgical History:  Procedure Laterality Date    LYMPH NODE EXCISION     [2] "   Current Outpatient Medications:     acetaminophen (TYLENOL) 500 MG Tab, Take 1-2 Tablets by mouth every 6 hours as needed for Moderate Pain., Disp: 60 Tablet, Rfl: 0    ibuprofen (MOTRIN) 600 MG Tab, Take 1 tablet by mouth every 6 to 8 hours as needed for pain, Disp: 40 Tablet, Rfl: 0    Semaglutide, 1 MG/DOSE, (OZEMPIC, 1 MG/DOSE,) 4 MG/3ML Solution Pen-injector, Inject 1 mg under the skin every 7 days., Disp: 2 mL, Rfl: 5    ergocalciferol (DRISDOL) 75813 UNIT capsule, Take 1 Capsule by mouth every 7 days., Disp: 4 Capsule, Rfl: 2    allopurinol (ZYLOPRIM) 300 MG Tab, Take 1 Tablet by mouth 2 times a day., Disp: 30 Tablet, Rfl: 4    atorvastatin (LIPITOR) 40 MG Tab, Take 1 Tablet by mouth every evening., Disp: 100 Tablet, Rfl: 3    Continuous Glucose Sensor (DEXCOM G7 SENSOR) Misc, Use changing every 10 days, Disp: 3 Each, Rfl: 5    Dapagliflozin Pro-metFORMIN ER 5-1000 MG TABLET SR 24 HR, Take 1 tablet by mouth 2 times a day, Disp: 180 Tablet, Rfl: 5    valsartan (DIOVAN) 160 MG Tab, Take 1 Tablet by mouth every day. To lower blood pressure, Disp: 100 Tablet, Rfl: 3    omega-3 acid ethyl esters (LOVAZA) 1 GM capsule, Take 2 Capsules by mouth 2 times a day. To lower triglycerides, Disp: 360 Capsule, Rfl: 3    colchicine (COLCRYS) 0.6 MG Tab, Take 2 tablets by mouth once then follow with one additional tab an hour later, Disp: 3 Tablet, Rfl: 2    doxepin (SINEQUAN) 10 MG Cap, Take 1 Capsule by mouth every evening., Disp: 30 Capsule, Rfl: 1    chlorhexidine (PERIDEX) 0.12 % Solution, Swish and spit 15 ml twice daily rinsing for 30 seconds morning and evening after toothbrushing, Disp: 473 mL, Rfl: 3    insulin lispro 100 UNIT/ML SC SOPN injection PEN, Use 5-6 units before meals. Then add per sliding scale. 150-200- 1 unit, 200-250 - 2 units , 250-300 - 3 units, 300-350 - 4 units, 350-400 - 5 units, 400+ 6 units . Max 40 units a day, Disp: 12 mL, Rfl: 5    COVID-19 mRNA Vac-Randi,Pfizer, (COMIRNATY) 30 MCG/0.3ML  Suspension Prefilled Syringe injection, Inject  into the shoulder, thigh, or buttocks., Disp: 0.3 mL, Rfl: 0    Choline Fenofibrate (FENOFIBRIC ACID) 135 MG CAPSULE DELAYED RELEASE, Take 1 Capsule by mouth every day. To lower triglycerides, Disp: 100 Capsule, Rfl: 3    Insulin Pen Needle, Use with insulin pen as directed, Disp: 100 Each, Rfl: 5    glucose blood strip, Test blood sugar as recommended by provider.  blood glucose monitoring kit., Disp: 300 Strip, Rfl: 11    Blood Glucose Monitoring Suppl (TRUE METRIX METER) w/Device Kit, Use one strip to test blood sugar three times daily., Disp: 1 Kit, Rfl: 11    TechLite AST Lancets Misc, Use one lancet to test blood sugar three times daily., Disp: 300 Each, Rfl: 11    Alcohol Swabs, Wipe site with prep pad prior to injection., Disp: 300 Each, Rfl: 11

## 2025-05-19 ENCOUNTER — OFFICE VISIT (OUTPATIENT)
Dept: VASCULAR LAB | Facility: MEDICAL CENTER | Age: 55
End: 2025-05-19
Attending: FAMILY MEDICINE
Payer: MEDICAID

## 2025-05-19 VITALS
BODY MASS INDEX: 27.32 KG/M2 | SYSTOLIC BLOOD PRESSURE: 106 MMHG | DIASTOLIC BLOOD PRESSURE: 70 MMHG | HEART RATE: 76 BPM | WEIGHT: 170 LBS | HEIGHT: 66 IN

## 2025-05-19 DIAGNOSIS — E11.59 HYPERTENSION ASSOCIATED WITH TYPE 2 DIABETES MELLITUS (HCC): ICD-10-CM

## 2025-05-19 DIAGNOSIS — R74.8 ELEVATED ALKALINE PHOSPHATASE LEVEL: ICD-10-CM

## 2025-05-19 DIAGNOSIS — E11.59 TYPE 2 DIABETES MELLITUS WITH OTHER CIRCULATORY COMPLICATION, WITHOUT LONG-TERM CURRENT USE OF INSULIN (HCC): ICD-10-CM

## 2025-05-19 DIAGNOSIS — E11.29 MICROALBUMINURIA DUE TO TYPE 2 DIABETES MELLITUS (HCC): ICD-10-CM

## 2025-05-19 DIAGNOSIS — M1A.0790 IDIOPATHIC CHRONIC GOUT OF FOOT WITHOUT TOPHUS, UNSPECIFIED LATERALITY: ICD-10-CM

## 2025-05-19 DIAGNOSIS — E78.3 CHYLOMICRONEMIA SYNDROME: ICD-10-CM

## 2025-05-19 DIAGNOSIS — K74.3 PRIMARY BILIARY CHOLANGITIS (HCC): ICD-10-CM

## 2025-05-19 DIAGNOSIS — I15.2 HYPERTENSION ASSOCIATED WITH TYPE 2 DIABETES MELLITUS (HCC): ICD-10-CM

## 2025-05-19 DIAGNOSIS — R80.9 MICROALBUMINURIA DUE TO TYPE 2 DIABETES MELLITUS (HCC): ICD-10-CM

## 2025-05-19 DIAGNOSIS — E78.1 FAMILIAL HYPERTRIGLYCERIDEMIA: Primary | ICD-10-CM

## 2025-05-19 PROCEDURE — RXMED WILLOW AMBULATORY MEDICATION CHARGE: Performed by: NURSE PRACTITIONER

## 2025-05-19 PROCEDURE — 3078F DIAST BP <80 MM HG: CPT | Performed by: FAMILY MEDICINE

## 2025-05-19 PROCEDURE — 99212 OFFICE O/P EST SF 10 MIN: CPT

## 2025-05-19 PROCEDURE — 99214 OFFICE O/P EST MOD 30 MIN: CPT | Performed by: FAMILY MEDICINE

## 2025-05-19 PROCEDURE — 3074F SYST BP LT 130 MM HG: CPT | Performed by: FAMILY MEDICINE

## 2025-05-19 ASSESSMENT — ENCOUNTER SYMPTOMS
FEVER: 0
COUGH: 0
PND: 0
WHEEZING: 0
CHILLS: 0
SHORTNESS OF BREATH: 0
CLAUDICATION: 0
HEMOPTYSIS: 0
ORTHOPNEA: 0
SPUTUM PRODUCTION: 0
PALPITATIONS: 0

## 2025-05-19 NOTE — PROGRESS NOTES
FOLLOW-UP [facility] LIPID CLINIC   05/19/25      Divya Carlislemarsha Damon, ref for evaluation/mgmt of dyslipidemia, est 1/2025  Referral Source: Jael Morejon A.P.RN    Subjective      Interval hx/concerns: last seen 2/2025,   GI due to PBC? Still pending, has not received a call from [facility] yet (referral sent there)  Telmisartan not covered, using valsartan  Had labs with stable lipids  No other new concerns or sx. Overall feeling great and very excited  CGM showing stable ranges, a1c down to 8, no low BS    DYSLIPIDEMIA  CURRENT MED MGMT  Current Rx:   Statin: atorva 40mg  Non-Statin: fenofibric acid 135mg   Supplements: None  Current ADRs/side effects? no  Adherence? Yes   LIFESTYLE MGMT  Barriers to care/SDOH: medicaid  Tobacco:  reports that he has been smoking cigarettes. He started smoking about 40 years ago. He has a 20.2 pack-year smoking history. He has never used smokeless tobacco.   Change in weight: Stable  Exercise habits: minimal exercise  Dietary patterns: common adult  Etoh: see sochx  PERTINENT HLD PMHX  Initial visit history: seen by PCP 1/6/25 and noted to have hyperglycemia, new dx T2D with gluc 300s.  Had received insulin while incarcerated and now on insulin glargine and lispro SSI along with metformin.   Severe HTG >4000 noted.  Prior max in 2022 was 215.   Currently denies Etoh intake.   Known gout on prednisone.   Started atorva and fenofibrate on 1/6/25.   No hx of acute pancreatitis.  Stopped meth 8 months ago.  No prior etoh use.    Age at Initial Dx: 51 though cannot recall if had labs earlier in life   Baseline Lipids Prior to Treatment:    Latest Reference Range & Units 06/08/22 15:42 01/07/25 07:54   Cholesterol,Tot 100 - 199 mg/dL 181 1007 (H)   Triglycerides 0 - 149 mg/dL 215 (H) >4425 (H)   HDL >=40 mg/dL 55 see below   LDL <100 mg/dL 83 see below     History of Major ASCVD: None  Other Established Vascular Disease: None  Secondary contributors/causes of dyslipidemia: severe  uncontrolled T2D    Previous lipid-lowering meds and outcome:  gemfibrozil - recently stopped     Hx of PAF (2022) - reports resolved.  No current cardiology    HTN: no prior dx or meds, not checking home BP     Dysglycemia: yes, T2D with currently a1c 12.3 and gluc 679    Antithrombotic tx: none  - no hx of bleeding        Current Outpatient Medications on File Prior to Visit   Medication Sig Dispense Refill    acetaminophen (TYLENOL) 500 MG Tab Take 1-2 Tablets by mouth every 6 hours as needed for Moderate Pain. 60 Tablet 0    ibuprofen (MOTRIN) 600 MG Tab Take 1 tablet by mouth every 6 to 8 hours as needed for pain 40 Tablet 0    Semaglutide, 1 MG/DOSE, (OZEMPIC, 1 MG/DOSE,) 4 MG/3ML Solution Pen-injector Inject 1 mg under the skin every 7 days. 2 mL 5    ergocalciferol (DRISDOL) 47480 UNIT capsule Take 1 Capsule by mouth every 7 days. 4 Capsule 2    allopurinol (ZYLOPRIM) 300 MG Tab Take 1 Tablet by mouth 2 times a day. 30 Tablet 4    atorvastatin (LIPITOR) 40 MG Tab Take 1 Tablet by mouth every evening. 100 Tablet 3    Continuous Glucose Sensor (DEXCOM G7 SENSOR) Misc Use changing every 10 days 3 Each 5    Dapagliflozin Pro-metFORMIN ER 5-1000 MG TABLET SR 24 HR Take 1 tablet by mouth 2 times a day 180 Tablet 5    valsartan (DIOVAN) 160 MG Tab Take 1 Tablet by mouth every day. To lower blood pressure 100 Tablet 3    omega-3 acid ethyl esters (LOVAZA) 1 GM capsule Take 2 Capsules by mouth 2 times a day. To lower triglycerides 360 Capsule 3    colchicine (COLCRYS) 0.6 MG Tab Take 2 tablets by mouth once then follow with one additional tab an hour later 3 Tablet 2    doxepin (SINEQUAN) 10 MG Cap Take 1 Capsule by mouth every evening. 30 Capsule 1    chlorhexidine (PERIDEX) 0.12 % Solution Swish and spit 15 ml twice daily rinsing for 30 seconds morning and evening after toothbrushing 473 mL 3    insulin lispro 100 UNIT/ML SC SOPN injection PEN Use 5-6 units before meals. Then add per sliding scale. 150-200- 1 unit,  200-250 - 2 units , 250-300 - 3 units, 300-350 - 4 units, 350-400 - 5 units, 400+ 6 units . Max 40 units a day 12 mL 5    COVID-19 mRNA Vac-Randi,Pfizer, (COMIRNATY) 30 MCG/0.3ML Suspension Prefilled Syringe injection Inject  into the shoulder, thigh, or buttocks. 0.3 mL 0    Choline Fenofibrate (FENOFIBRIC ACID) 135 MG CAPSULE DELAYED RELEASE Take 1 Capsule by mouth every day. To lower triglycerides 100 Capsule 3    Insulin Pen Needle Use with insulin pen as directed 100 Each 5    glucose blood strip Test blood sugar as recommended by provider.  blood glucose monitoring kit. 300 Strip 11    Blood Glucose Monitoring Suppl (TRUE METRIX METER) w/Device Kit Use one strip to test blood sugar three times daily. 1 Kit 11    TechLite AST Lancets Misc Use one lancet to test blood sugar three times daily. 300 Each 11    Alcohol Swabs Wipe site with prep pad prior to injection. 300 Each 11     No current facility-administered medications on file prior to visit.      Allergies   Allergen Reactions    Pcn [Penicillins]     Trazodone       Social History     Tobacco Use    Smoking status: Every Day     Current packs/day: 0.50     Average packs/day: 0.5 packs/day for 40.4 years (20.2 ttl pk-yrs)     Types: Cigarettes     Start date: 1985    Smokeless tobacco: Never    Tobacco comments:     Patient is working on reducing cigarette use. He cut back from 15 cigarettes per day to 8-9 cigarettes per day.   Vaping Use    Vaping status: Never Used   Substance Use Topics    Alcohol use: Not Currently     Comment: occ    Drug use: Yes     Types: Methamphetamines     Comment: patient quit meth 09/09/22     Review of Systems   Constitutional:  Negative for chills, fever and malaise/fatigue.   Respiratory:  Negative for cough, hemoptysis, sputum production, shortness of breath and wheezing.    Cardiovascular:  Negative for chest pain, palpitations, orthopnea, claudication, leg swelling and PND.         Objective    Vitals:    05/19/25 0908  "  BP: 106/70   BP Location: Left arm   Patient Position: Sitting   BP Cuff Size: Adult   Pulse: 76   Weight: 77.1 kg (170 lb)   Height: 1.676 m (5' 6\")     BP Readings from Last 5 Encounters:   05/19/25 106/70   05/15/25 119/71   04/24/25 106/72   04/07/25 102/68   02/27/25 117/68     BMI Readings from Last 1 Encounters:   05/19/25 27.44 kg/m²     Wt Readings from Last 3 Encounters:   05/19/25 77.1 kg (170 lb)   05/15/25 76.6 kg (168 lb 14.4 oz)   04/24/25 76.2 kg (168 lb)     Physical Exam  Constitutional:       General: He is not in acute distress.     Appearance: Normal appearance. He is not ill-appearing.   HENT:      Head: Normocephalic and atraumatic.   Eyes:      Conjunctiva/sclera: Conjunctivae normal.      Comments: No corneal arcus   Neck:      Vascular: No carotid bruit.   Cardiovascular:      Rate and Rhythm: Normal rate and regular rhythm.      Pulses:           Carotid pulses are 2+ on the right side and 2+ on the left side.       Radial pulses are 2+ on the right side and 2+ on the left side.        Popliteal pulses are 2+ on the right side and 2+ on the left side.        Dorsalis pedis pulses are 2+ on the right side and 2+ on the left side.      Heart sounds: No murmur heard.  Pulmonary:      Effort: Pulmonary effort is normal.      Breath sounds: Normal breath sounds.   Musculoskeletal:      Cervical back: Normal range of motion.      Right lower leg: No edema.      Left lower leg: No edema.   Feet:      Comments: No achilles thickening or nodularity  Skin:     General: Skin is warm and dry.      Coloration: Skin is not cyanotic or mottled.      Findings: No wound.      Comments: No tendon xanthomas,eruptive xanthomas, or palmar crease xanthomas    Neurological:      General: No focal deficit present.      Mental Status: He is alert.   Psychiatric:         Mood and Affect: Mood normal.       DATA REVIEW:  Most Recent Lipid Panel:   Lab Results   Component Value Date/Time    CHOLSTRLTOT 133 " 04/05/2025 07:26 AM    LDL 47 04/05/2025 07:26 AM    HDL 67 04/05/2025 07:26 AM    TRIGLYCERIDE 93 04/05/2025 07:26 AM      Latest Reference Range & Units 06/08/22 15:42 01/07/25 07:54 01/10/25 09:00 04/05/25 07:26   Triglycerides 0 - 149 mg/dL 215 (H) >4425 (H) 2841 (H) 93     Lab Results   Component Value Date/Time    LDL 47 04/05/2025 07:26 AM    LDL see below 01/10/2025 09:00 AM    LDL see below 01/07/2025 07:54 AM    LDL 83 06/08/2022 03:42 PM     Lab Results   Component Value Date/Time    LIPOPROTA 19 01/10/2025 09:00 AM      Lab Results   Component Value Date/Time    APOB 58 (L) 04/05/2025 07:26 AM    APOB 142 (H) 01/10/2025 09:00 AM      Lab Results   Component Value Date/Time    CRPHIGHSEN 6.7 (H) 01/10/2025 09:00 AM       Other Pertinent Blood Work:   Lab Results   Component Value Date    SODIUM 140 04/05/2025    POTASSIUM 4.2 04/05/2025    CHLORIDE 108 04/05/2025    CO2 19 (L) 04/05/2025    ANION 13.0 04/05/2025    GLUCOSE 103 (H) 04/05/2025    BUN 22 04/05/2025    CREATININE 1.04 04/05/2025    CREATININE 1.03 04/05/2025    CALCIUM 9.9 04/05/2025    ASTSGOT 36 04/05/2025    ALTSGPT 40 04/05/2025    ALKPHOSPHAT 68 04/05/2025    TBILIRUBIN 0.3 04/05/2025    ALBUMIN 4.6 04/05/2025    AGRATIO 1.4 04/05/2025    TSHULTRASEN 2.040 01/07/2025     Lab Results   Component Value Date/Time    HBA1C 8.1 (H) 04/05/2025 07:26 AM    HBA1C 12.3 (H) 01/07/2025 07:54 AM    HBA1C 7.0 (A) 03/28/2023 09:22 AM       Lab Results   Component Value Date/Time    MALBCRT see below 04/05/2025 07:26 AM    MICROALBUR <1.2 04/05/2025 07:26 AM       Latest Reference Range & Units 01/10/25 09:00   C-Peptide 0.5 - 3.3 ng/mL 2.4      Latest Reference Range & Units 01/10/25 09:00   TERRIE Antibody 0.0 - 5.0 IU/mL <5.0      Latest Reference Range & Units 01/10/25 09:00   Anti-Mitochondrial Ab 0.0 - 24.9 Units 38.6 (H)       IMAGING: none       PROCEDURES: none            ASSESSMENT AND PLAN  1. Familial hypertriglyceridemia  APOLIPOPROTEIN B     "Comp Metabolic Panel    LDL, DIRECT    Lipid Profile    CRP HIGH SENSITIVE (CARDIAC)      2. Chylomicronemia syndrome        3. Type 2 diabetes mellitus with other circulatory complication, without long-term current use of insulin (HCC)        4. Microalbuminuria due to type 2 diabetes mellitus (HCC)        5. Hypertension associated with type 2 diabetes mellitus (HCC)        6. Primary biliary cholangitis (HCC)        7. Elevated alkaline phosphatase level        8. Idiopathic chronic gout of foot without tophus, unspecified laterality          PATIENT TYPE: Primary Prevention, Type 2 Diabetes Mellitus, and Metabolic Syndrome    MAJOR ASCVD: None      OTHER ESTABLISHED CVD:     1) hx of PAF - reports currently stable     EVIDENCE OF GENETIC DYSLIPIDEMIA: Yes   - severe high TC >1000 and TG >4000 indicative of type III FDBL as generally this is \"inducible\" severe TG - genetic + secondary factors   - ddx includes type IV FHTG (generally no acute panc) vs type V MCS (less likely due to no acute panc in the past)    FH / LIPIDEMIA genotyping: YES, ordered via [facility]  Your order ID is [id number].  - still pending results   -  as has high risk for genetically mediated lipid condition and will help aid in definitive dx and tx including enrollment in apoC3i studies   - GENETIC COUNSELING PROVIDED FOR TSTING     APOE genotyping (FDBL eval): HETEROZYGOUS APO e2/e3: This genotype is not significantly associated with an increased risk for type III hyperlipoproteinemia    SECONDARY CAUSES / CONTRIBUTORS: yes  Metabolic:  T2D - severely uncontrolled is significant contributor to IR leading to increase apoC3 expression - a strong inhibitor of LPL  Thyroid disease: excluded - normal TSH    Liver disease:     # high ALP with positive anti-mukesh ab screening, presumed primary biliary cholangitis   - this will increase cholesterol and LDL due to presence of lp-X in the LDL density range - generally not deemed atherogenic  Plan: ref to " GI for further eval, will likely need UDCA therapy     Renal disease/nephrotic syndrome:  excluded   Dietary-induced (ketogenic, lean mass hyper-responder)? no  Medications: Corticosteroids  - acute tx for gout likely increasing expression of TGs   Alcohol use: none currently or in the past     ACC/AHA INDICATION FOR STATIN THERAPY:  Primary Severe HLD / FH (baseline LDL-C >190)  - severe HTG also important for statin therapy but ineffective if TG >1000 until LPL stabilizes     ASCVD RISK CALCULATIONS:  PCE: The 10-year ASCVD risk score (Elizabeth PERLA, et al., 2019) is: 7.4%, N/A    OTHER SIGNIFICANT RISK MARKERS:  High-risk conditions: N/A  Risk-enhancers: Persistently elevated LDL-C >159, Metabolic syndrome , Persistently elevated trigs >174, and hs-CRP >1.9  Lipoprotein(a): Favorable (<30 mg/dl or <75 nmol/L    TARGETS / GOALS (per most recent ACC/AHA guidelines):  At goal as of 5/5025?   Primary (panc risk reduction):  TG <500 - MET  Secondary (ascvd risk reduction): apoB <90- MET       Non-HDL-C <130 - MET       Direct LDL-C <100 - MET  Tertiary:     TG <150 - MET     LIFESTYLE INTERVENTIONS  TOBACCO:  reports that he has been smoking cigarettes. He started smoking about 40 years ago. He has a 20.2 pack-year smoking history. He has never used smokeless tobacco.   - continued complete avoidance of all tobacco products     PHYSICAL ACTIVITY: at least 150 min per week of moderate intensity    NUTRITION: Chylomicron-clearing diet plan (low total fat calories with reduce CHO content)- provided specific handout due to severe hypertriglyceridemia , Ref to eval with RD to help with tx goals and meal planning , Weight reduction - reduce caloric intake by 300 - 500 kcal/day to achieve 1-2lb weight loss per week , and - handout provided     ETOH: complete abstinence recommended    WT MGMT:  focus on 5-7% reduction as initial goal     LIPID-LOWERING MEDICATION MANAGEMENT:     Statin Therapy:   Continue atorva 40mg daily -  though minimal to no benefit for TG lowering until <1000     Non-Statin Therapies:     LDL/apoB therapies:  ZETIA: add as additional agent for 10-15% TG lowering   NEXLETOL: not currently indicated   BAS: contraindicated   PCSK9 mAb: not indicated   LEQVIO: not indicated     TG therapies:  OMEGA-3 FAs: continue lovaza 2g BID, would avoid vascepa due to prior hx of AF   FOR PRIOR AUTH:   severe TG >500 despite current use of max-dose statin + fenofibric acid, needs further TG lowering with this agent to reduce risk for acute pancreatitis as per standard guidelines     FIBRATE:  continue fenofibric acid 135mg - better bioavail and FDA-approved for use with high potency statin   ApoC3i: possible candidate for research study enrollment     LP(a) modifying therapy: available in clinical research trials only at this time     PCSK9i strategy indications: Not currently indicated    RECOMMENDED SUPPLEMENTS: None     APHERESIS: n/a     BLOOD PRESSURE CONTROL   Office BP goal per ACC/AHA <130/80  Home BP at goal:  yes  Office BP at goal:  no  24h ABPM:  not ordered to date   RDN candidate? NO  Contributing factors: n/a    Plan:   - start/continue home BP monitoring, reviewed correct technique, provide BP log and instructions  - order 24h ABPM:  NO  - routine monitoring of lytes/gfr   Medications:  -continue valsartan 160mg daily , telmisartan preferred but not covered     GLYCEMIC STATUS/MGMT: Diabetic, T2D, severely uncontrolled, consider SHARRI  - TERRIE and C-peptide indicative of T2D  Goal A1c < 7.0  Lab Results   Component Value Date/Time    HBA1C 8.1 (H) 04/05/2025 07:26 AM    HBA1C 12.3 (H) 01/07/2025 07:54 AM    HBA1C 7.0 (A) 03/28/2023 09:22 AM       Lab Results   Component Value Date/Time    MALBCRT see below 04/05/2025 07:26 AM    MICROALBUR <1.2 04/05/2025 07:26 AM     Plan:  - ongoing tx with pharm DM clinic   - continue current medication plan   - recommmend for routine care with PCP (or endocrine) to include regular  A1c monitoring, annual albumin/creatinine ratio (ACR), annual diabetic retinopathy screening, foot exams, annual flu vaccine, and updates to pneumonia vaccines as appropriate      ANTITHROMBOTIC THERAPY: Not currently recommended    OTHER    # hx of meth abuse, in remission - continued abstinence     STUDIES:  none   FOLLOW-UP: 6 months      Alexis Abbott M.D.  ASHLEY, board-certified Clinical Lipidologist   [facility]   [facility]   [phone]

## 2025-05-19 NOTE — TELEPHONE ENCOUNTER
Received request via: Pharmacy    Was the patient seen in the last year in this department? Yes    Does the patient have an active prescription (recently filled or refills available) for medication(s) requested? No    Pharmacy Name: [facility]    Does the patient have assisted Plus and need 100-day supply? (This applies to ALL medications) Patient does not have [facility]    Future Appointments         Provider Department Center    7/7/2025 8:40 AM (Arrive by 8:25 AM) DELMIS Gutierrez [facility]    8/21/2025 9:00 AM (Arrive by 8:45 AM) [facility] PHARMACIST [facility]    11/17/2025 9:40 AM Alexis Abbott M.D. [facility]

## 2025-05-20 ENCOUNTER — DOCUMENTATION (OUTPATIENT)
Dept: VASCULAR LAB | Facility: MEDICAL CENTER | Age: 55
End: 2025-05-20
Payer: MEDICAID

## 2025-05-21 RX ORDER — COLCHICINE 0.6 MG/1
TABLET ORAL
Qty: 3 TABLET | Refills: 2 | OUTPATIENT
Start: 2025-05-21

## 2025-05-21 NOTE — PROGRESS NOTES
Called [facility] to follow up on referral. [facility] states they do have the referral but confirmed they had the wrong phone number for patient. I gave them the correct phone number to contact patient.   Called patient to let him know he can contact [facility] to schedule appt. Patient states understanding and will call to schedule. Phone number for [facility] provided to patient over the phone.             Idris Sims, Medical Assistant   [facility]   Ph: [phone]  Fx: [phone]

## 2025-05-23 ENCOUNTER — PHARMACY VISIT (OUTPATIENT)
Dept: PHARMACY | Facility: MEDICAL CENTER | Age: 55
End: 2025-05-23
Payer: COMMERCIAL

## 2025-05-23 PROCEDURE — RXMED WILLOW AMBULATORY MEDICATION CHARGE: Performed by: INTERNAL MEDICINE

## 2025-05-23 PROCEDURE — RXMED WILLOW AMBULATORY MEDICATION CHARGE: Performed by: FAMILY MEDICINE

## 2025-05-28 PROCEDURE — RXMED WILLOW AMBULATORY MEDICATION CHARGE: Performed by: NURSE PRACTITIONER

## 2025-05-31 ENCOUNTER — PHARMACY VISIT (OUTPATIENT)
Dept: PHARMACY | Facility: MEDICAL CENTER | Age: 55
End: 2025-05-31
Payer: COMMERCIAL

## 2025-05-31 DIAGNOSIS — M1A.0790 IDIOPATHIC CHRONIC GOUT OF FOOT WITHOUT TOPHUS, UNSPECIFIED LATERALITY: ICD-10-CM

## 2025-06-02 ENCOUNTER — TELEPHONE (OUTPATIENT)
Dept: MEDICAL GROUP | Facility: MEDICAL CENTER | Age: 55
End: 2025-06-02

## 2025-06-02 ENCOUNTER — PHARMACY VISIT (OUTPATIENT)
Dept: PHARMACY | Facility: MEDICAL CENTER | Age: 55
End: 2025-06-02
Payer: MEDICARE

## 2025-06-02 ENCOUNTER — HOSPITAL ENCOUNTER (EMERGENCY)
Facility: MEDICAL CENTER | Age: 55
End: 2025-06-02
Attending: EMERGENCY MEDICINE
Payer: COMMERCIAL

## 2025-06-02 VITALS
BODY MASS INDEX: 27.49 KG/M2 | TEMPERATURE: 97.9 F | OXYGEN SATURATION: 97 % | HEART RATE: 56 BPM | HEIGHT: 66 IN | WEIGHT: 171.08 LBS | RESPIRATION RATE: 16 BRPM | DIASTOLIC BLOOD PRESSURE: 78 MMHG | SYSTOLIC BLOOD PRESSURE: 141 MMHG

## 2025-06-02 DIAGNOSIS — M10.9 ACUTE GOUT OF RIGHT ANKLE, UNSPECIFIED CAUSE: Primary | ICD-10-CM

## 2025-06-02 DIAGNOSIS — M10.9 ACUTE GOUT OF LEFT ANKLE, UNSPECIFIED CAUSE: ICD-10-CM

## 2025-06-02 PROCEDURE — RXMED WILLOW AMBULATORY MEDICATION CHARGE: Performed by: NURSE PRACTITIONER

## 2025-06-02 PROCEDURE — 99282 EMERGENCY DEPT VISIT SF MDM: CPT

## 2025-06-02 PROCEDURE — RXMED WILLOW AMBULATORY MEDICATION CHARGE: Performed by: EMERGENCY MEDICINE

## 2025-06-02 RX ORDER — METHYLPREDNISOLONE 4 MG/1
TABLET ORAL
Qty: 21 EACH | Refills: 0 | Status: SHIPPED | OUTPATIENT
Start: 2025-06-02

## 2025-06-02 RX ORDER — COLCHICINE 0.6 MG/1
TABLET ORAL
Qty: 3 TABLET | Refills: 2 | Status: SHIPPED | OUTPATIENT
Start: 2025-06-02

## 2025-06-02 RX ORDER — METHYLPREDNISOLONE 4 MG/1
TABLET ORAL
Qty: 21 EACH | Refills: 0 | OUTPATIENT
Start: 2025-06-02

## 2025-06-02 RX ORDER — KETOROLAC TROMETHAMINE 15 MG/ML
15 INJECTION, SOLUTION INTRAMUSCULAR; INTRAVENOUS ONCE
Status: DISCONTINUED | OUTPATIENT
Start: 2025-06-02 | End: 2025-06-02

## 2025-06-02 RX ORDER — HYDROCODONE BITARTRATE AND ACETAMINOPHEN 5; 325 MG/1; MG/1
2 TABLET ORAL ONCE
Refills: 0 | Status: DISCONTINUED | OUTPATIENT
Start: 2025-06-02 | End: 2025-06-02

## 2025-06-02 ASSESSMENT — PAIN DESCRIPTION - PAIN TYPE: TYPE: INTRACTABLE PAIN

## 2025-06-02 NOTE — TELEPHONE ENCOUNTER
Received request via: Pharmacy    Was the patient seen in the last year in this department? Yes    Does the patient have an active prescription (recently filled or refills available) for medication(s) requested? No    [facility] Name: [facility]    Does the patient have care home Plus and need 100-day supply? (This applies to ALL medications) Patient does not have [facility]

## 2025-06-02 NOTE — TELEPHONE ENCOUNTER
Patient came into office to request this medication methylPREDNISolone (MEDROL DOSEPAK) 4 MG Tablet Therapy Pack   as he is having a gout flare up. Patient was told a message to pcp would be sent but theres no guarantee of refill as patient was not seen for an appointment. Please sent medication to this pharmacy:   [facility]  [address] [place] [address], [address]  [facility] [address]  Phone: [phone] Fax: [phone]    Call patient to follow up on this.

## 2025-06-02 NOTE — TELEPHONE ENCOUNTER
Received request via: Pharmacy    Was the patient seen in the last year in this department? Yes    Does the patient have an active prescription (recently filled or refills available) for medication(s) requested? No    [facility]    Does the patient have snf Plus and need 100-day supply? (This applies to ALL medications) Patient does not have [facility]

## 2025-06-03 NOTE — ED PROVIDER NOTES
"ED Provider Note    CHIEF COMPLAINT  Chief Complaint   Patient presents with    Ankle Pain     C/o left ankle pain since Friday. Possible gout flare up.hx of Gout. Been taking ibuprofen and allopurinol with no relief. Stated \" I think I need prednisone\"        EXTERNAL RECORDS REVIEWED  Outpatient Notes patient has a history of proximal A-fib diabetes and gout    HPI/ROS  LIMITATION TO HISTORY   Select: : None  OUTSIDE HISTORIAN(S):  stephen Damon is a 55 y.o. male who presents to the emergency department chief complaint of left ankle pain.  Patient states that he has had a flare ever since he got back from [place] over the last week.  He is on the allopurinol and the colchicine and he is doing his rest as best he can but states is still quite swollen and is because he has not been on a Medrol Dosepak.  He states he asked his primary care but unfortunately that the only thing she did not fill and that is what he is requesting today.  He denies fevers or chills he states his foot is just swollen and this is chronically where he gets some of his flares    PAST MEDICAL HISTORY   has a past medical history of Gout, Other hyperlipidemia, Paroxysmal atrial fibrillation (HCC), and Type 2 diabetes mellitus without complication, without long-term current use of insulin (HCC).    SURGICAL HISTORY   has a past surgical history that includes lymph node excision.    FAMILY HISTORY  Reviewed    SOCIAL HISTORY  Social History     Tobacco Use    Smoking status: Every Day     Current packs/day: 0.50     Average packs/day: 0.5 packs/day for 40.4 years (20.2 ttl pk-yrs)     Types: Cigarettes     Start date: 1985    Smokeless tobacco: Never    Tobacco comments:     Patient is working on reducing cigarette use. He cut back from 15 cigarettes per day to 8-9 cigarettes per day.   Vaping Use    Vaping status: Never Used   Substance and Sexual Activity    Alcohol use: Not Currently     Comment: occ    Drug use: Yes     Types: " "Methamphetamines     Comment: patient quit meth 09/09/22    Sexual activity: Yes     Partners: Male     Birth control/protection: Condom       CURRENT MEDICATIONS  Home Medications       Reviewed by Dorian Dowell R.N. (Registered Nurse) on 06/02/25 at 1953  Med List Status: Partial     Medication Last Dose Status   acetaminophen (TYLENOL) 500 MG Tab  Active   Alcohol Swabs  Active   allopurinol (ZYLOPRIM) 300 MG Tab  Active   atorvastatin (LIPITOR) 40 MG Tab  Active   Blood Glucose Monitoring Suppl (TRUE METRIX METER) w/Device Kit  Active   chlorhexidine (PERIDEX) 0.12 % Solution  Active   Choline Fenofibrate (FENOFIBRIC ACID) 135 MG CAPSULE DELAYED RELEASE  Active   colchicine (COLCRYS) 0.6 MG Tab  Active   Continuous Glucose Sensor (DEXCOM G7 SENSOR) Misc  Active   COVID-19 mRNA Vac-Randi,Pfizer, (COMIRNATY) 30 MCG/0.3ML Suspension Prefilled Syringe injection  Active   Dapagliflozin Pro-metFORMIN ER 5-1000 MG TABLET SR 24 HR  Active   doxepin (SINEQUAN) 10 MG Cap  Active   ergocalciferol (DRISDOL) 16479 UNIT capsule  Active   glucose blood strip  Active   ibuprofen (MOTRIN) 600 MG Tab  Active   insulin lispro 100 UNIT/ML SC SOPN injection PEN  Active   Insulin Pen Needle  Active   omega-3 acid ethyl esters (LOVAZA) 1 GM capsule  Active   Semaglutide, 1 MG/DOSE, (OZEMPIC, 1 MG/DOSE,) 4 MG/3ML Solution Pen-injector  Active   TechLite AST Lancets Misc  Active   valsartan (DIOVAN) 160 MG Tab  Active                    ALLERGIES  Allergies[1]    PHYSICAL EXAM  VITAL SIGNS: /72   Pulse 73   Temp 36.8 °C (98.2 °F) (Temporal)   Resp 16   Ht 1.676 m (5' 6\")   Wt 77.6 kg (171 lb 1.2 oz)   SpO2 96%   BMI 27.61 kg/m²    Pulse OX: Pulse Oxygen level is within normal limits on room air  Constitutional: Alert in no apparent distress.  Eyes: PERound. Conjunctiva normal, non-icteric.   EXT/Back left lower extremity there is swelling kind of distal to the ankle near the calcaneus not quite in the midfoot but the " posterior foot.  DP pulse 2+ no erythema no warmth no induration there is tenderness to rotation but no micromotion tenderness  Skin: Warm, Dry, No erythema, No rash.   Neurologic: Alert and oriented, Grossly non-focal.         COURSE & MEDICAL DECISION MAKING    ASSESSMENT, COURSE AND PLAN/  DISPOSITION AND DISCUSSIONS  Care Narrative:     Patient is a 55-year-old male presents emerged department with a gout flare he is already on allopurinol as well as colchicine.  He is requesting a Medrol Dosepak.  I discussed the risk benefits in the setting of his diabetes but he states is really the only thing that is helping take his gout flare.  He is can keep a close eye on his sugars he will return to the ED for any new worsening issues and fortunately there is no signs or symptoms of indicate infection at this time.  The patient feels comfortable with the plan and going home    I have discussed management of the patient with the following physicians and MOR's:  none    Discussion of management with other QHP or appropriate source(s): None     Escalation of care considered, and ultimately not performed:Laboratory analysis    Barriers to care at this time, including but not limited to: na.     Decision tools and prescription drugs considered including, but not limited to: Medication modification medrol dosepak.    The patient will return for new or worsening symptoms and is stable at the time of discharge.    The patient is referred to a primary physician for blood pressure management, diabetic screening, and for all other preventative health concerns.    DISPOSITION:  Patient will be discharged home in stable condition.    FOLLOW UP:  Jael Morejon A.P.R.NAlva  [address] [address]  [phone]    Schedule an appointment as soon as possible for a visit         OUTPATIENT MEDICATIONS:  Discharge Medication List as of 6/2/2025  8:30 PM        START taking these medications    Details   methylPREDNISolone  (MEDROL DOSEPAK) 4 MG Tablet Therapy Pack Use as directed Disp-21 Each, R-0, Normal               FINAL DIAGNOSIS  1. Acute gout of right ankle, unspecified cause         Electronically signed by: Marixa Hong M.D., 6/2/2025 8:17 PM           [1]   Allergies  Allergen Reactions    Pcn [Penicillins]     Trazodone

## 2025-06-03 NOTE — ED TRIAGE NOTES
"Divya Damon  55 y.o.  male  Chief Complaint   Patient presents with    Ankle Pain     C/o left ankle pain since Friday. Possible gout flare up.hx of Gout. Been taking ibuprofen and allopurinol with no relief. Stated \" I think I need prednisone\"        "

## 2025-06-13 PROCEDURE — RXMED WILLOW AMBULATORY MEDICATION CHARGE: Performed by: NURSE PRACTITIONER

## 2025-06-13 PROCEDURE — RXMED WILLOW AMBULATORY MEDICATION CHARGE: Performed by: FAMILY MEDICINE

## 2025-06-13 PROCEDURE — RXMED WILLOW AMBULATORY MEDICATION CHARGE: Performed by: INTERNAL MEDICINE

## 2025-06-14 ENCOUNTER — PHARMACY VISIT (OUTPATIENT)
Dept: PHARMACY | Facility: MEDICAL CENTER | Age: 55
End: 2025-06-14
Payer: MEDICARE

## 2025-06-17 PROCEDURE — RXMED WILLOW AMBULATORY MEDICATION CHARGE: Performed by: NURSE PRACTITIONER

## 2025-06-21 DIAGNOSIS — M1A.0790 IDIOPATHIC CHRONIC GOUT OF FOOT WITHOUT TOPHUS, UNSPECIFIED LATERALITY: ICD-10-CM

## 2025-06-23 ENCOUNTER — PHARMACY VISIT (OUTPATIENT)
Dept: PHARMACY | Facility: MEDICAL CENTER | Age: 55
End: 2025-06-23
Payer: MEDICARE

## 2025-06-24 NOTE — TELEPHONE ENCOUNTER
Received request via: Pharmacy    Was the patient seen in the last year in this department? Yes    Does the patient have an active prescription (recently filled or refills available) for medication(s) requested? No    Pharmacy Name: [facility]    Does the patient have care home Plus and need 100-day supply? (This applies to ALL medications) Patient does not have [facility]

## 2025-06-25 RX ORDER — IBUPROFEN 600 MG/1
TABLET, FILM COATED ORAL
Qty: 40 TABLET | Refills: 0 | Status: SHIPPED | OUTPATIENT
Start: 2025-06-25

## 2025-06-25 RX ORDER — ACETAMINOPHEN 500 MG
500-1000 TABLET ORAL EVERY 6 HOURS PRN
Qty: 60 TABLET | Refills: 0 | Status: SHIPPED | OUTPATIENT
Start: 2025-06-25

## 2025-07-01 DIAGNOSIS — M1A.0790 IDIOPATHIC CHRONIC GOUT OF FOOT WITHOUT TOPHUS, UNSPECIFIED LATERALITY: ICD-10-CM

## 2025-07-01 PROCEDURE — RXMED WILLOW AMBULATORY MEDICATION CHARGE: Performed by: NURSE PRACTITIONER

## 2025-07-02 PROCEDURE — RXMED WILLOW AMBULATORY MEDICATION CHARGE: Performed by: NURSE PRACTITIONER

## 2025-07-02 RX ORDER — ALLOPURINOL 300 MG/1
300 TABLET ORAL 2 TIMES DAILY
Qty: 30 TABLET | Refills: 4 | Status: SHIPPED | OUTPATIENT
Start: 2025-07-02

## 2025-07-02 NOTE — TELEPHONE ENCOUNTER
Received request via: Pharmacy    Was the patient seen in the last year in this department? Yes    Does the patient have an active prescription (recently filled or refills available) for medication(s) requested? No    Pharmacy Name: [facility]    Does the patient have care home Plus and need 100-day supply? (This applies to ALL medications) Patient does not have [facility]    Future Appointments         Provider Department Center    11/17/2025 9:40 AM Alexis Abbott M.D. [facility]

## 2025-07-07 ENCOUNTER — PHARMACY VISIT (OUTPATIENT)
Dept: PHARMACY | Facility: MEDICAL CENTER | Age: 55
End: 2025-07-07
Payer: MEDICARE

## 2025-07-07 PROCEDURE — RXMED WILLOW AMBULATORY MEDICATION CHARGE: Performed by: NURSE PRACTITIONER

## 2025-07-08 PROCEDURE — RXMED WILLOW AMBULATORY MEDICATION CHARGE: Performed by: NURSE PRACTITIONER

## 2025-07-09 ENCOUNTER — PHARMACY VISIT (OUTPATIENT)
Dept: PHARMACY | Facility: MEDICAL CENTER | Age: 55
End: 2025-07-09
Payer: MEDICARE

## 2025-07-23 ENCOUNTER — PHARMACY VISIT (OUTPATIENT)
Dept: PHARMACY | Facility: MEDICAL CENTER | Age: 55
End: 2025-07-23
Payer: MEDICARE

## 2025-07-23 PROCEDURE — RXMED WILLOW AMBULATORY MEDICATION CHARGE: Performed by: NURSE PRACTITIONER

## 2025-07-23 PROCEDURE — RXMED WILLOW AMBULATORY MEDICATION CHARGE: Performed by: FAMILY MEDICINE

## 2025-07-25 ENCOUNTER — PHARMACY VISIT (OUTPATIENT)
Dept: PHARMACY | Facility: MEDICAL CENTER | Age: 55
End: 2025-07-25
Payer: COMMERCIAL